# Patient Record
Sex: FEMALE | Race: WHITE | Employment: OTHER | URBAN - METROPOLITAN AREA
[De-identification: names, ages, dates, MRNs, and addresses within clinical notes are randomized per-mention and may not be internally consistent; named-entity substitution may affect disease eponyms.]

---

## 2017-05-10 PROBLEM — K80.12 CALCULUS OF GALLBLADDER WITH ACUTE ON CHRONIC CHOLECYSTITIS WITHOUT OBSTRUCTION: Status: ACTIVE | Noted: 2017-05-10

## 2017-05-24 ENCOUNTER — OFFICE VISIT (OUTPATIENT)
Dept: SURGERY | Age: 67
End: 2017-05-24

## 2017-05-24 VITALS
WEIGHT: 159.6 LBS | HEIGHT: 67 IN | BODY MASS INDEX: 25.05 KG/M2 | RESPIRATION RATE: 14 BRPM | HEART RATE: 90 BPM | SYSTOLIC BLOOD PRESSURE: 124 MMHG | DIASTOLIC BLOOD PRESSURE: 78 MMHG

## 2017-05-24 DIAGNOSIS — K80.12 CALCULUS OF GALLBLADDER WITH ACUTE ON CHRONIC CHOLECYSTITIS WITHOUT OBSTRUCTION: Primary | ICD-10-CM

## 2017-05-24 DIAGNOSIS — Z98.890 POST-OPERATIVE STATE: ICD-10-CM

## 2017-05-24 PROCEDURE — 99024 POSTOP FOLLOW-UP VISIT: CPT | Performed by: SURGERY

## 2017-06-16 ENCOUNTER — HOSPITAL ENCOUNTER (OUTPATIENT)
Dept: OTHER | Age: 67
Discharge: HOME | End: 2017-06-16
Attending: OTOLARYNGOLOGY | Admitting: OTOLARYNGOLOGY

## 2017-06-16 LAB — CALCIUM SERPL-MCNC: 9 MG/DL (ref 8.3–10.6)

## 2019-03-15 ENCOUNTER — APPOINTMENT (OUTPATIENT)
Dept: GENERAL RADIOLOGY | Age: 69
DRG: 247 | End: 2019-03-15
Payer: MEDICARE

## 2019-03-15 ENCOUNTER — HOSPITAL ENCOUNTER (INPATIENT)
Age: 69
LOS: 1 days | Discharge: HOME | DRG: 247 | End: 2019-03-16
Attending: EMERGENCY MEDICINE | Admitting: HOSPITALIST
Payer: MEDICARE

## 2019-03-15 ENCOUNTER — APPOINTMENT (OUTPATIENT)
Dept: CT IMAGING | Age: 69
DRG: 247 | End: 2019-03-15
Payer: MEDICARE

## 2019-03-15 DIAGNOSIS — R07.9 CHEST PAIN, UNSPECIFIED TYPE: Primary | ICD-10-CM

## 2019-03-15 PROBLEM — I20.0 UNSTABLE ANGINA (HCC): Status: ACTIVE | Noted: 2019-03-15

## 2019-03-15 PROBLEM — Q75.2 HYPERTELORISM: Status: ACTIVE | Noted: 2019-03-15

## 2019-03-15 LAB
ALBUMIN SERPL-MCNC: 4.3 GM/DL (ref 3.4–5)
ALP BLD-CCNC: 67 IU/L (ref 40–129)
ALT SERPL-CCNC: 13 U/L (ref 10–40)
ANION GAP SERPL CALCULATED.3IONS-SCNC: 12 MMOL/L (ref 4–16)
AST SERPL-CCNC: 22 IU/L (ref 15–37)
BASOPHILS ABSOLUTE: 0.1 K/CU MM
BASOPHILS RELATIVE PERCENT: 1.1 % (ref 0–1)
BILIRUB SERPL-MCNC: 0.8 MG/DL (ref 0–1)
BUN BLDV-MCNC: 24 MG/DL (ref 6–23)
CALCIUM SERPL-MCNC: 8.7 MG/DL (ref 8.3–10.6)
CHLORIDE BLD-SCNC: 105 MMOL/L (ref 99–110)
CHOLESTEROL: 151 MG/DL
CO2: 24 MMOL/L (ref 21–32)
CREAT SERPL-MCNC: 1 MG/DL (ref 0.6–1.1)
DIFFERENTIAL TYPE: ABNORMAL
EOSINOPHILS ABSOLUTE: 0.1 K/CU MM
EOSINOPHILS RELATIVE PERCENT: 1.7 % (ref 0–3)
GFR AFRICAN AMERICAN: >60 ML/MIN/1.73M2
GFR NON-AFRICAN AMERICAN: 55 ML/MIN/1.73M2
GLUCOSE BLD-MCNC: 108 MG/DL (ref 70–99)
HCT VFR BLD CALC: 42.7 % (ref 37–47)
HDLC SERPL-MCNC: 40 MG/DL
HEMOGLOBIN: 14.1 GM/DL (ref 12.5–16)
IMMATURE NEUTROPHIL %: 0.2 % (ref 0–0.43)
INR BLD: 1.03 INDEX
LDL CHOLESTEROL DIRECT: 112 MG/DL
LV EF: 53 %
LVEF MODALITY: NORMAL
LYMPHOCYTES ABSOLUTE: 1.3 K/CU MM
LYMPHOCYTES RELATIVE PERCENT: 27.1 % (ref 24–44)
MAGNESIUM: 1.8 MG/DL (ref 1.8–2.4)
MCH RBC QN AUTO: 30.5 PG (ref 27–31)
MCHC RBC AUTO-ENTMCNC: 33 % (ref 32–36)
MCV RBC AUTO: 92.4 FL (ref 78–100)
MONOCYTES ABSOLUTE: 0.4 K/CU MM
MONOCYTES RELATIVE PERCENT: 8.1 % (ref 0–4)
NUCLEATED RBC %: 0 %
PDW BLD-RTO: 12.6 % (ref 11.7–14.9)
PLATELET # BLD: 256 K/CU MM (ref 140–440)
PMV BLD AUTO: 9.9 FL (ref 7.5–11.1)
POTASSIUM SERPL-SCNC: 3.8 MMOL/L (ref 3.5–5.1)
PROTHROMBIN TIME: 11.7 SECONDS (ref 9.12–12.5)
RBC # BLD: 4.62 M/CU MM (ref 4.2–5.4)
REASON FOR REJECTION: NORMAL
REASON FOR REJECTION: NORMAL
REJECTED TEST: NORMAL
SEGMENTED NEUTROPHILS ABSOLUTE COUNT: 2.9 K/CU MM
SEGMENTED NEUTROPHILS RELATIVE PERCENT: 61.8 % (ref 36–66)
SODIUM BLD-SCNC: 141 MMOL/L (ref 135–145)
TOTAL IMMATURE NEUTOROPHIL: 0.01 K/CU MM
TOTAL NUCLEATED RBC: 0 K/CU MM
TOTAL PROTEIN: 6.8 GM/DL (ref 6.4–8.2)
TRIGL SERPL-MCNC: 127 MG/DL
TROPONIN T: <0.01 NG/ML
TSH HIGH SENSITIVITY: 4.88 UIU/ML (ref 0.27–4.2)
WBC # BLD: 4.7 K/CU MM (ref 4–10.5)

## 2019-03-15 PROCEDURE — 6370000000 HC RX 637 (ALT 250 FOR IP)

## 2019-03-15 PROCEDURE — C1894 INTRO/SHEATH, NON-LASER: HCPCS

## 2019-03-15 PROCEDURE — 84484 ASSAY OF TROPONIN QUANT: CPT

## 2019-03-15 PROCEDURE — 80061 LIPID PANEL: CPT

## 2019-03-15 PROCEDURE — 36415 COLL VENOUS BLD VENIPUNCTURE: CPT

## 2019-03-15 PROCEDURE — C1874 STENT, COATED/COV W/DEL SYS: HCPCS

## 2019-03-15 PROCEDURE — 92928 PRQ TCAT PLMT NTRAC ST 1 LES: CPT

## 2019-03-15 PROCEDURE — 2580000003 HC RX 258

## 2019-03-15 PROCEDURE — 2140000000 HC CCU INTERMEDIATE R&B

## 2019-03-15 PROCEDURE — 6370000000 HC RX 637 (ALT 250 FOR IP): Performed by: NURSE PRACTITIONER

## 2019-03-15 PROCEDURE — 84443 ASSAY THYROID STIM HORMONE: CPT

## 2019-03-15 PROCEDURE — 99285 EMERGENCY DEPT VISIT HI MDM: CPT

## 2019-03-15 PROCEDURE — 85025 COMPLETE CBC W/AUTO DIFF WBC: CPT

## 2019-03-15 PROCEDURE — 71260 CT THORAX DX C+: CPT

## 2019-03-15 PROCEDURE — 83735 ASSAY OF MAGNESIUM: CPT

## 2019-03-15 PROCEDURE — 96374 THER/PROPH/DIAG INJ IV PUSH: CPT

## 2019-03-15 PROCEDURE — 2500000003 HC RX 250 WO HCPCS

## 2019-03-15 PROCEDURE — 83721 ASSAY OF BLOOD LIPOPROTEIN: CPT

## 2019-03-15 PROCEDURE — 2709999900 HC NON-CHARGEABLE SUPPLY

## 2019-03-15 PROCEDURE — 6360000004 HC RX CONTRAST MEDICATION

## 2019-03-15 PROCEDURE — 94761 N-INVAS EAR/PLS OXIMETRY MLT: CPT

## 2019-03-15 PROCEDURE — C1887 CATHETER, GUIDING: HCPCS

## 2019-03-15 PROCEDURE — 4A023N7 MEASUREMENT OF CARDIAC SAMPLING AND PRESSURE, LEFT HEART, PERCUTANEOUS APPROACH: ICD-10-PCS | Performed by: HOSPITALIST

## 2019-03-15 PROCEDURE — 6360000002 HC RX W HCPCS: Performed by: NURSE PRACTITIONER

## 2019-03-15 PROCEDURE — 80053 COMPREHEN METABOLIC PANEL: CPT

## 2019-03-15 PROCEDURE — 6360000002 HC RX W HCPCS

## 2019-03-15 PROCEDURE — 027034Z DILATION OF CORONARY ARTERY, ONE ARTERY WITH DRUG-ELUTING INTRALUMINAL DEVICE, PERCUTANEOUS APPROACH: ICD-10-PCS | Performed by: HOSPITALIST

## 2019-03-15 PROCEDURE — 6370000000 HC RX 637 (ALT 250 FOR IP): Performed by: INTERNAL MEDICINE

## 2019-03-15 PROCEDURE — 85610 PROTHROMBIN TIME: CPT

## 2019-03-15 PROCEDURE — 93306 TTE W/DOPPLER COMPLETE: CPT

## 2019-03-15 PROCEDURE — 93005 ELECTROCARDIOGRAM TRACING: CPT | Performed by: EMERGENCY MEDICINE

## 2019-03-15 PROCEDURE — 6360000002 HC RX W HCPCS: Performed by: EMERGENCY MEDICINE

## 2019-03-15 PROCEDURE — C1769 GUIDE WIRE: HCPCS

## 2019-03-15 PROCEDURE — 2580000003 HC RX 258: Performed by: INTERNAL MEDICINE

## 2019-03-15 PROCEDURE — B2151ZZ FLUOROSCOPY OF LEFT HEART USING LOW OSMOLAR CONTRAST: ICD-10-PCS | Performed by: HOSPITALIST

## 2019-03-15 PROCEDURE — 6360000004 HC RX CONTRAST MEDICATION: Performed by: INTERNAL MEDICINE

## 2019-03-15 PROCEDURE — 93458 L HRT ARTERY/VENTRICLE ANGIO: CPT

## 2019-03-15 PROCEDURE — 71045 X-RAY EXAM CHEST 1 VIEW: CPT

## 2019-03-15 RX ORDER — ACETAMINOPHEN 325 MG/1
650 TABLET ORAL EVERY 4 HOURS PRN
Status: DISCONTINUED | OUTPATIENT
Start: 2019-03-15 | End: 2019-03-16 | Stop reason: HOSPADM

## 2019-03-15 RX ORDER — NITROGLYCERIN 0.4 MG/1
0.4 TABLET SUBLINGUAL EVERY 5 MIN PRN
Status: DISCONTINUED | OUTPATIENT
Start: 2019-03-15 | End: 2019-03-16 | Stop reason: HOSPADM

## 2019-03-15 RX ORDER — SODIUM CHLORIDE 0.9 % (FLUSH) 0.9 %
10 SYRINGE (ML) INJECTION EVERY 12 HOURS SCHEDULED
Status: DISCONTINUED | OUTPATIENT
Start: 2019-03-15 | End: 2019-03-16 | Stop reason: HOSPADM

## 2019-03-15 RX ORDER — ATORVASTATIN CALCIUM 20 MG/1
20 TABLET, FILM COATED ORAL NIGHTLY
Status: DISCONTINUED | OUTPATIENT
Start: 2019-03-15 | End: 2019-03-16 | Stop reason: HOSPADM

## 2019-03-15 RX ORDER — METHYLPREDNISOLONE SODIUM SUCCINATE 125 MG/2ML
125 INJECTION, POWDER, LYOPHILIZED, FOR SOLUTION INTRAMUSCULAR; INTRAVENOUS ONCE
Status: COMPLETED | OUTPATIENT
Start: 2019-03-15 | End: 2019-03-15

## 2019-03-15 RX ORDER — SODIUM CHLORIDE 0.9 % (FLUSH) 0.9 %
10 SYRINGE (ML) INJECTION PRN
Status: DISCONTINUED | OUTPATIENT
Start: 2019-03-15 | End: 2019-03-16 | Stop reason: HOSPADM

## 2019-03-15 RX ORDER — ONDANSETRON 2 MG/ML
4 INJECTION INTRAMUSCULAR; INTRAVENOUS EVERY 6 HOURS PRN
Status: DISCONTINUED | OUTPATIENT
Start: 2019-03-15 | End: 2019-03-16 | Stop reason: HOSPADM

## 2019-03-15 RX ORDER — CLOPIDOGREL BISULFATE 75 MG/1
75 TABLET ORAL DAILY
Status: DISCONTINUED | OUTPATIENT
Start: 2019-03-16 | End: 2019-03-16 | Stop reason: HOSPADM

## 2019-03-15 RX ORDER — ASPIRIN 81 MG/1
TABLET, CHEWABLE ORAL
Status: COMPLETED
Start: 2019-03-15 | End: 2019-03-15

## 2019-03-15 RX ORDER — SODIUM CHLORIDE 9 MG/ML
INJECTION, SOLUTION INTRAVENOUS
Status: COMPLETED
Start: 2019-03-15 | End: 2019-03-15

## 2019-03-15 RX ORDER — LOSARTAN POTASSIUM AND HYDROCHLOROTHIAZIDE 12.5; 5 MG/1; MG/1
2 TABLET ORAL NIGHTLY
Status: DISCONTINUED | OUTPATIENT
Start: 2019-03-15 | End: 2019-03-16 | Stop reason: HOSPADM

## 2019-03-15 RX ORDER — ASPIRIN 81 MG/1
81 TABLET, CHEWABLE ORAL DAILY
Status: DISCONTINUED | OUTPATIENT
Start: 2019-03-16 | End: 2019-03-15 | Stop reason: SDUPTHER

## 2019-03-15 RX ORDER — 0.9 % SODIUM CHLORIDE 0.9 %
10 VIAL (ML) INJECTION
Status: COMPLETED | OUTPATIENT
Start: 2019-03-15 | End: 2019-03-15

## 2019-03-15 RX ORDER — ATROPINE SULFATE 0.4 MG/ML
0.5 AMPUL (ML) INJECTION
Status: ACTIVE | OUTPATIENT
Start: 2019-03-15 | End: 2019-03-15

## 2019-03-15 RX ORDER — SODIUM CHLORIDE 9 MG/ML
INJECTION, SOLUTION INTRAVENOUS CONTINUOUS
Status: DISCONTINUED | OUTPATIENT
Start: 2019-03-15 | End: 2019-03-16

## 2019-03-15 RX ORDER — MELOXICAM 15 MG/1
15 TABLET ORAL DAILY PRN
COMMUNITY
End: 2022-09-20

## 2019-03-15 RX ORDER — LOSARTAN POTASSIUM AND HYDROCHLOROTHIAZIDE 25; 100 MG/1; MG/1
1 TABLET ORAL NIGHTLY
Status: ON HOLD | COMMUNITY
End: 2019-03-16 | Stop reason: HOSPADM

## 2019-03-15 RX ORDER — METOPROLOL SUCCINATE 25 MG/1
25 TABLET, EXTENDED RELEASE ORAL NIGHTLY
Status: DISCONTINUED | OUTPATIENT
Start: 2019-03-15 | End: 2019-03-16 | Stop reason: HOSPADM

## 2019-03-15 RX ORDER — ASPIRIN 81 MG/1
81 TABLET, CHEWABLE ORAL DAILY
Status: DISCONTINUED | OUTPATIENT
Start: 2019-03-16 | End: 2019-03-16 | Stop reason: HOSPADM

## 2019-03-15 RX ORDER — MORPHINE SULFATE 4 MG/ML
1 INJECTION, SOLUTION INTRAMUSCULAR; INTRAVENOUS
Status: ACTIVE | OUTPATIENT
Start: 2019-03-15 | End: 2019-03-15

## 2019-03-15 RX ORDER — SODIUM CHLORIDE 9 MG/ML
INJECTION, SOLUTION INTRAVENOUS CONTINUOUS
Status: DISCONTINUED | OUTPATIENT
Start: 2019-03-15 | End: 2019-03-16 | Stop reason: HOSPADM

## 2019-03-15 RX ORDER — METOPROLOL SUCCINATE 25 MG/1
25 TABLET, EXTENDED RELEASE ORAL NIGHTLY
Status: ON HOLD | COMMUNITY
End: 2019-03-16 | Stop reason: HOSPADM

## 2019-03-15 RX ADMIN — Medication 10 ML: at 13:04

## 2019-03-15 RX ADMIN — ENOXAPARIN SODIUM 40 MG: 40 INJECTION SUBCUTANEOUS at 15:02

## 2019-03-15 RX ADMIN — LOSARTAN POTASSIUM AND HYDROCHLOROTHIAZIDE 2 TABLET: 12.5; 5 TABLET ORAL at 21:01

## 2019-03-15 RX ADMIN — SODIUM CHLORIDE: 9 INJECTION, SOLUTION INTRAVENOUS at 10:51

## 2019-03-15 RX ADMIN — SODIUM CHLORIDE: 9 INJECTION, SOLUTION INTRAVENOUS at 21:02

## 2019-03-15 RX ADMIN — ATORVASTATIN CALCIUM 20 MG: 20 TABLET, FILM COATED ORAL at 21:02

## 2019-03-15 RX ADMIN — METHYLPREDNISOLONE SODIUM SUCCINATE 125 MG: 125 INJECTION, POWDER, FOR SOLUTION INTRAMUSCULAR; INTRAVENOUS at 10:51

## 2019-03-15 RX ADMIN — METOPROLOL SUCCINATE 25 MG: 25 TABLET, EXTENDED RELEASE ORAL at 21:01

## 2019-03-15 RX ADMIN — ASPIRIN 81 MG 324 MG: 81 TABLET ORAL at 10:51

## 2019-03-15 RX ADMIN — SODIUM CHLORIDE: 9 INJECTION, SOLUTION INTRAVENOUS at 13:15

## 2019-03-15 RX ADMIN — IOPAMIDOL 75 ML: 755 INJECTION, SOLUTION INTRAVENOUS at 13:04

## 2019-03-15 ASSESSMENT — PAIN DESCRIPTION - ORIENTATION: ORIENTATION: LEFT

## 2019-03-15 ASSESSMENT — PAIN DESCRIPTION - LOCATION: LOCATION: CHEST

## 2019-03-15 ASSESSMENT — PAIN SCALES - GENERAL: PAINLEVEL_OUTOF10: 2

## 2019-03-16 VITALS
DIASTOLIC BLOOD PRESSURE: 62 MMHG | OXYGEN SATURATION: 100 % | HEIGHT: 67 IN | BODY MASS INDEX: 25.9 KG/M2 | WEIGHT: 165 LBS | HEART RATE: 79 BPM | SYSTOLIC BLOOD PRESSURE: 122 MMHG | RESPIRATION RATE: 18 BRPM | TEMPERATURE: 97.4 F

## 2019-03-16 LAB
ANION GAP SERPL CALCULATED.3IONS-SCNC: 11 MMOL/L (ref 4–16)
BUN BLDV-MCNC: 24 MG/DL (ref 6–23)
CALCIUM SERPL-MCNC: 9 MG/DL (ref 8.3–10.6)
CHLORIDE BLD-SCNC: 103 MMOL/L (ref 99–110)
CHOLESTEROL: 159 MG/DL
CO2: 26 MMOL/L (ref 21–32)
CREAT SERPL-MCNC: 0.9 MG/DL (ref 0.6–1.1)
EKG ATRIAL RATE: 74 BPM
EKG DIAGNOSIS: NORMAL
EKG P AXIS: 53 DEGREES
EKG P-R INTERVAL: 176 MS
EKG Q-T INTERVAL: 386 MS
EKG QRS DURATION: 86 MS
EKG QTC CALCULATION (BAZETT): 428 MS
EKG R AXIS: -44 DEGREES
EKG T AXIS: 21 DEGREES
EKG VENTRICULAR RATE: 74 BPM
GFR AFRICAN AMERICAN: >60 ML/MIN/1.73M2
GFR NON-AFRICAN AMERICAN: >60 ML/MIN/1.73M2
GLUCOSE BLD-MCNC: 164 MG/DL (ref 70–99)
HCT VFR BLD CALC: 37.3 % (ref 37–47)
HDLC SERPL-MCNC: 47 MG/DL
HEMOGLOBIN: 12.3 GM/DL (ref 12.5–16)
LDL CHOLESTEROL DIRECT: 110 MG/DL
MAGNESIUM: 2 MG/DL (ref 1.8–2.4)
MCH RBC QN AUTO: 30.1 PG (ref 27–31)
MCHC RBC AUTO-ENTMCNC: 33 % (ref 32–36)
MCV RBC AUTO: 91.4 FL (ref 78–100)
PDW BLD-RTO: 12.3 % (ref 11.7–14.9)
PLATELET # BLD: 281 K/CU MM (ref 140–440)
PMV BLD AUTO: 10.5 FL (ref 7.5–11.1)
POTASSIUM SERPL-SCNC: 3.4 MMOL/L (ref 3.5–5.1)
POTASSIUM SERPL-SCNC: 3.7 MMOL/L (ref 3.5–5.1)
RBC # BLD: 4.08 M/CU MM (ref 4.2–5.4)
SODIUM BLD-SCNC: 140 MMOL/L (ref 135–145)
TRIGL SERPL-MCNC: 113 MG/DL
TROPONIN T: <0.01 NG/ML
WBC # BLD: 9.7 K/CU MM (ref 4–10.5)

## 2019-03-16 PROCEDURE — 85027 COMPLETE CBC AUTOMATED: CPT

## 2019-03-16 PROCEDURE — 80061 LIPID PANEL: CPT

## 2019-03-16 PROCEDURE — 84132 ASSAY OF SERUM POTASSIUM: CPT

## 2019-03-16 PROCEDURE — 80048 BASIC METABOLIC PNL TOTAL CA: CPT

## 2019-03-16 PROCEDURE — 6360000002 HC RX W HCPCS: Performed by: NURSE PRACTITIONER

## 2019-03-16 PROCEDURE — 36415 COLL VENOUS BLD VENIPUNCTURE: CPT

## 2019-03-16 PROCEDURE — 94761 N-INVAS EAR/PLS OXIMETRY MLT: CPT

## 2019-03-16 PROCEDURE — 6370000000 HC RX 637 (ALT 250 FOR IP): Performed by: INTERNAL MEDICINE

## 2019-03-16 PROCEDURE — 83735 ASSAY OF MAGNESIUM: CPT

## 2019-03-16 PROCEDURE — 84484 ASSAY OF TROPONIN QUANT: CPT

## 2019-03-16 PROCEDURE — 6360000002 HC RX W HCPCS: Performed by: HOSPITALIST

## 2019-03-16 PROCEDURE — 83721 ASSAY OF BLOOD LIPOPROTEIN: CPT

## 2019-03-16 RX ORDER — ASPIRIN 81 MG/1
81 TABLET, CHEWABLE ORAL DAILY
Qty: 30 TABLET | Refills: 3 | Status: SHIPPED | OUTPATIENT
Start: 2019-03-17

## 2019-03-16 RX ORDER — NITROGLYCERIN 0.4 MG/1
TABLET SUBLINGUAL
Qty: 25 TABLET | Refills: 3 | Status: SHIPPED | OUTPATIENT
Start: 2019-03-16

## 2019-03-16 RX ORDER — CLOPIDOGREL BISULFATE 75 MG/1
75 TABLET ORAL DAILY
Qty: 30 TABLET | Refills: 3 | Status: ON HOLD | OUTPATIENT
Start: 2019-03-17 | End: 2022-01-10 | Stop reason: HOSPADM

## 2019-03-16 RX ORDER — ATORVASTATIN CALCIUM 20 MG/1
20 TABLET, FILM COATED ORAL NIGHTLY
Qty: 30 TABLET | Refills: 3 | Status: ON HOLD | OUTPATIENT
Start: 2019-03-16 | End: 2022-01-10 | Stop reason: HOSPADM

## 2019-03-16 RX ORDER — POTASSIUM CHLORIDE 7.45 MG/ML
10 INJECTION INTRAVENOUS PRN
Status: DISCONTINUED | OUTPATIENT
Start: 2019-03-16 | End: 2019-03-16 | Stop reason: HOSPADM

## 2019-03-16 RX ORDER — METOPROLOL SUCCINATE 25 MG/1
25 TABLET, EXTENDED RELEASE ORAL NIGHTLY
Qty: 30 TABLET | Refills: 3 | Status: SHIPPED | OUTPATIENT
Start: 2019-03-16 | End: 2022-09-20

## 2019-03-16 RX ORDER — LOSARTAN POTASSIUM AND HYDROCHLOROTHIAZIDE 12.5; 5 MG/1; MG/1
2 TABLET ORAL NIGHTLY
Qty: 30 TABLET | Refills: 3 | Status: ON HOLD | OUTPATIENT
Start: 2019-03-16 | End: 2022-01-10 | Stop reason: SDUPTHER

## 2019-03-16 RX ADMIN — POTASSIUM CHLORIDE 10 MEQ: 7.46 INJECTION, SOLUTION INTRAVENOUS at 12:37

## 2019-03-16 RX ADMIN — POTASSIUM CHLORIDE 10 MEQ: 7.46 INJECTION, SOLUTION INTRAVENOUS at 09:46

## 2019-03-16 RX ADMIN — POTASSIUM CHLORIDE 10 MEQ: 7.46 INJECTION, SOLUTION INTRAVENOUS at 14:15

## 2019-03-16 RX ADMIN — ENOXAPARIN SODIUM 40 MG: 40 INJECTION SUBCUTANEOUS at 08:48

## 2019-03-16 RX ADMIN — POTASSIUM CHLORIDE 10 MEQ: 7.46 INJECTION, SOLUTION INTRAVENOUS at 10:58

## 2019-03-16 RX ADMIN — ASPIRIN 81 MG 81 MG: 81 TABLET ORAL at 08:48

## 2019-03-16 RX ADMIN — CLOPIDOGREL BISULFATE 75 MG: 75 TABLET ORAL at 08:47

## 2019-06-16 ENCOUNTER — HOSPITAL ENCOUNTER (OUTPATIENT)
Age: 69
Setting detail: OBSERVATION
Discharge: HOME | End: 2019-06-17
Attending: EMERGENCY MEDICINE | Admitting: HOSPITALIST
Payer: MEDICARE

## 2019-06-16 ENCOUNTER — APPOINTMENT (OUTPATIENT)
Dept: GENERAL RADIOLOGY | Age: 69
End: 2019-06-16
Payer: MEDICARE

## 2019-06-16 DIAGNOSIS — R07.9 CHEST PAIN, UNSPECIFIED TYPE: Primary | ICD-10-CM

## 2019-06-16 LAB
ALBUMIN SERPL-MCNC: 4.2 GM/DL (ref 3.4–5)
ALP BLD-CCNC: 70 IU/L (ref 40–128)
ALT SERPL-CCNC: 11 U/L (ref 10–40)
ANION GAP SERPL CALCULATED.3IONS-SCNC: 10 MMOL/L (ref 4–16)
AST SERPL-CCNC: 16 IU/L (ref 15–37)
BASOPHILS ABSOLUTE: 0 K/CU MM
BASOPHILS RELATIVE PERCENT: 0.9 % (ref 0–1)
BILIRUB SERPL-MCNC: 0.4 MG/DL (ref 0–1)
BUN BLDV-MCNC: 19 MG/DL (ref 6–23)
CALCIUM SERPL-MCNC: 9.8 MG/DL (ref 8.3–10.6)
CHLORIDE BLD-SCNC: 102 MMOL/L (ref 99–110)
CO2: 26 MMOL/L (ref 21–32)
CREAT SERPL-MCNC: 1.1 MG/DL (ref 0.6–1.1)
DIFFERENTIAL TYPE: ABNORMAL
EOSINOPHILS ABSOLUTE: 0.1 K/CU MM
EOSINOPHILS RELATIVE PERCENT: 2.5 % (ref 0–3)
GFR AFRICAN AMERICAN: 59 ML/MIN/1.73M2
GFR NON-AFRICAN AMERICAN: 49 ML/MIN/1.73M2
GLUCOSE BLD-MCNC: 121 MG/DL (ref 70–99)
HCT VFR BLD CALC: 38.8 % (ref 37–47)
HEMOGLOBIN: 12.4 GM/DL (ref 12.5–16)
IMMATURE NEUTROPHIL %: 0.2 % (ref 0–0.43)
LYMPHOCYTES ABSOLUTE: 1.2 K/CU MM
LYMPHOCYTES RELATIVE PERCENT: 27.4 % (ref 24–44)
MAGNESIUM: 1.7 MG/DL (ref 1.8–2.4)
MCH RBC QN AUTO: 30 PG (ref 27–31)
MCHC RBC AUTO-ENTMCNC: 32 % (ref 32–36)
MCV RBC AUTO: 93.9 FL (ref 78–100)
MONOCYTES ABSOLUTE: 0.3 K/CU MM
MONOCYTES RELATIVE PERCENT: 7.6 % (ref 0–4)
NUCLEATED RBC %: 0 %
PDW BLD-RTO: 12.4 % (ref 11.7–14.9)
PLATELET # BLD: 220 K/CU MM (ref 140–440)
PMV BLD AUTO: 10 FL (ref 7.5–11.1)
POTASSIUM SERPL-SCNC: 4.2 MMOL/L (ref 3.5–5.1)
RBC # BLD: 4.13 M/CU MM (ref 4.2–5.4)
SEGMENTED NEUTROPHILS ABSOLUTE COUNT: 2.7 K/CU MM
SEGMENTED NEUTROPHILS RELATIVE PERCENT: 61.4 % (ref 36–66)
SODIUM BLD-SCNC: 138 MMOL/L (ref 135–145)
TOTAL IMMATURE NEUTOROPHIL: 0.01 K/CU MM
TOTAL NUCLEATED RBC: 0 K/CU MM
TOTAL PROTEIN: 6.8 GM/DL (ref 6.4–8.2)
TROPONIN T: <0.01 NG/ML
WBC # BLD: 4.4 K/CU MM (ref 4–10.5)

## 2019-06-16 PROCEDURE — 83735 ASSAY OF MAGNESIUM: CPT

## 2019-06-16 PROCEDURE — 71046 X-RAY EXAM CHEST 2 VIEWS: CPT

## 2019-06-16 PROCEDURE — G0378 HOSPITAL OBSERVATION PER HR: HCPCS

## 2019-06-16 PROCEDURE — 93005 ELECTROCARDIOGRAM TRACING: CPT | Performed by: EMERGENCY MEDICINE

## 2019-06-16 PROCEDURE — 6360000002 HC RX W HCPCS: Performed by: NURSE PRACTITIONER

## 2019-06-16 PROCEDURE — 6370000000 HC RX 637 (ALT 250 FOR IP): Performed by: NURSE PRACTITIONER

## 2019-06-16 PROCEDURE — 85025 COMPLETE CBC W/AUTO DIFF WBC: CPT

## 2019-06-16 PROCEDURE — 99285 EMERGENCY DEPT VISIT HI MDM: CPT

## 2019-06-16 PROCEDURE — 36415 COLL VENOUS BLD VENIPUNCTURE: CPT

## 2019-06-16 PROCEDURE — 96372 THER/PROPH/DIAG INJ SC/IM: CPT

## 2019-06-16 PROCEDURE — 80053 COMPREHEN METABOLIC PANEL: CPT

## 2019-06-16 PROCEDURE — 84484 ASSAY OF TROPONIN QUANT: CPT

## 2019-06-16 PROCEDURE — 6370000000 HC RX 637 (ALT 250 FOR IP): Performed by: EMERGENCY MEDICINE

## 2019-06-16 PROCEDURE — 2580000003 HC RX 258: Performed by: NURSE PRACTITIONER

## 2019-06-16 RX ORDER — SODIUM CHLORIDE 0.9 % (FLUSH) 0.9 %
10 SYRINGE (ML) INJECTION EVERY 12 HOURS SCHEDULED
Status: DISCONTINUED | OUTPATIENT
Start: 2019-06-16 | End: 2019-06-17 | Stop reason: HOSPADM

## 2019-06-16 RX ORDER — ONDANSETRON 2 MG/ML
4 INJECTION INTRAMUSCULAR; INTRAVENOUS EVERY 6 HOURS PRN
Status: DISCONTINUED | OUTPATIENT
Start: 2019-06-16 | End: 2019-06-17 | Stop reason: HOSPADM

## 2019-06-16 RX ORDER — ASPIRIN 81 MG/1
81 TABLET, CHEWABLE ORAL DAILY
Status: DISCONTINUED | OUTPATIENT
Start: 2019-06-16 | End: 2019-06-17 | Stop reason: HOSPADM

## 2019-06-16 RX ORDER — ATORVASTATIN CALCIUM 40 MG/1
40 TABLET, FILM COATED ORAL NIGHTLY
Status: DISCONTINUED | OUTPATIENT
Start: 2019-06-16 | End: 2019-06-17 | Stop reason: HOSPADM

## 2019-06-16 RX ORDER — SODIUM CHLORIDE 0.9 % (FLUSH) 0.9 %
10 SYRINGE (ML) INJECTION PRN
Status: DISCONTINUED | OUTPATIENT
Start: 2019-06-16 | End: 2019-06-17 | Stop reason: HOSPADM

## 2019-06-16 RX ORDER — ASPIRIN 81 MG/1
324 TABLET, CHEWABLE ORAL ONCE
Status: COMPLETED | OUTPATIENT
Start: 2019-06-16 | End: 2019-06-16

## 2019-06-16 RX ORDER — LOSARTAN POTASSIUM AND HYDROCHLOROTHIAZIDE 12.5; 5 MG/1; MG/1
2 TABLET ORAL NIGHTLY
Status: DISCONTINUED | OUTPATIENT
Start: 2019-06-16 | End: 2019-06-17

## 2019-06-16 RX ORDER — NITROGLYCERIN 0.4 MG/1
0.4 TABLET SUBLINGUAL EVERY 5 MIN PRN
Status: DISCONTINUED | OUTPATIENT
Start: 2019-06-16 | End: 2019-06-17 | Stop reason: HOSPADM

## 2019-06-16 RX ORDER — METOPROLOL SUCCINATE 25 MG/1
25 TABLET, EXTENDED RELEASE ORAL NIGHTLY
Status: DISCONTINUED | OUTPATIENT
Start: 2019-06-16 | End: 2019-06-17

## 2019-06-16 RX ORDER — CLOPIDOGREL BISULFATE 75 MG/1
75 TABLET ORAL DAILY
Status: DISCONTINUED | OUTPATIENT
Start: 2019-06-16 | End: 2019-06-17 | Stop reason: HOSPADM

## 2019-06-16 RX ORDER — ACETAMINOPHEN 325 MG/1
650 TABLET ORAL EVERY 4 HOURS PRN
Status: DISCONTINUED | OUTPATIENT
Start: 2019-06-16 | End: 2019-06-17 | Stop reason: HOSPADM

## 2019-06-16 RX ORDER — ATORVASTATIN CALCIUM 20 MG/1
20 TABLET, FILM COATED ORAL NIGHTLY
Status: DISCONTINUED | OUTPATIENT
Start: 2019-06-16 | End: 2019-06-16 | Stop reason: SDUPTHER

## 2019-06-16 RX ADMIN — ASPIRIN 81 MG 324 MG: 81 TABLET ORAL at 13:41

## 2019-06-16 RX ADMIN — METOPROLOL SUCCINATE 25 MG: 25 TABLET, EXTENDED RELEASE ORAL at 20:58

## 2019-06-16 RX ADMIN — ACETAMINOPHEN 650 MG: 325 TABLET ORAL at 21:13

## 2019-06-16 RX ADMIN — ATORVASTATIN CALCIUM 40 MG: 40 TABLET, FILM COATED ORAL at 20:58

## 2019-06-16 RX ADMIN — SODIUM CHLORIDE, PRESERVATIVE FREE 10 ML: 5 INJECTION INTRAVENOUS at 20:58

## 2019-06-16 RX ADMIN — ENOXAPARIN SODIUM 40 MG: 40 INJECTION SUBCUTANEOUS at 20:58

## 2019-06-16 RX ADMIN — NITROGLYCERIN 0.4 MG: 0.4 TABLET, ORALLY DISINTEGRATING SUBLINGUAL at 13:41

## 2019-06-16 ASSESSMENT — PAIN SCALES - GENERAL
PAINLEVEL_OUTOF10: 0
PAINLEVEL_OUTOF10: 7
PAINLEVEL_OUTOF10: 4

## 2019-06-16 ASSESSMENT — PAIN DESCRIPTION - PAIN TYPE: TYPE: ACUTE PAIN

## 2019-06-16 ASSESSMENT — PAIN DESCRIPTION - LOCATION: LOCATION: CHEST

## 2019-06-16 ASSESSMENT — PAIN DESCRIPTION - DESCRIPTORS: DESCRIPTORS: SHARP

## 2019-06-16 NOTE — ED NOTES
Bed: ED-15  Expected date:   Expected time:   Means of arrival:   Comments:  Triage KATIANA Gamino RN  06/16/19 8929

## 2019-06-16 NOTE — ED PROVIDER NOTES
Emergency Department Encounter    Patient: Maycol Little  MRN: [id number]  : 1950  Date of Evaluation: 2019  ED Provider:  Tom Coyne    Triage Chief Complaint:   Chest Pain and Dizziness    [place]:  Maycol Little is a 78 y.o. female that presents with complaint of chest pain and lightheadedness, chest pain radiated to the left arm. Started this morning, she took 2 nitro at home with some resolution of pain, went from a 7 to a 5 out of 10. She has stent placed 3 months ago by Dr. Karlene Narayan. She is not having any numbness or weakness in her extremities. She has been having episodes of shortness of breath with exertion since the stent was placed. She is on Plavix with no missed doses. No other blood thinners. No leg swelling or pain. No fevers. No cough. Denies other exacerbating factors for the pain. ROS:  10 systems reviewed and negative except as above.      Past Medical History:   Diagnosis Date    Hyperlipidemia     Hypertension     \"on medication since \"     Past Surgical History:   Procedure Laterality Date    ABDOMINOPLASTY  10+ yrs ago    \"tummy tuck\"    BREAST REDUCTION SURGERY      belle    CHOLECYSTECTOMY, LAPAROSCOPIC  05/10/2017    CORONARY ANGIOPLASTY WITH STENT PLACEMENT      HYSTERECTOMY  age 44    left both ovaries\"    KNEE ARTHROPLASTY Right 2015     Family History   Problem Relation Age of Onset    Stroke Mother     No Known Problems Father     Heart Disease Brother      Social History     Socioeconomic History    Marital status:      Spouse name: Not on file    Number of children: Not on file    Years of education: Not on file    Highest education level: Not on file   Occupational History    Not on file   Social Needs    Financial resource strain: Not on file    Food insecurity:     Worry: Not on file     Inability: Not on file    Transportation needs:     Medical: Not on file     Non-medical: Not on file   Tobacco Use    Smoking status: Never Smoker    Smokeless tobacco: Never Used   Substance and Sexual Activity    Alcohol use: Yes     Comment: Socially    Drug use: No    Sexual activity: Not on file   Lifestyle    Physical activity:     Days per week: Not on file     Minutes per session: Not on file    Stress: Not on file   Relationships    Social connections:     Talks on phone: Not on file     Gets together: Not on file     Attends [employer] service: Not on file     Active member of club or organization: Not on file     Attends meetings of clubs or organizations: Not on file     Relationship status: Not on file    Intimate partner violence:     Fear of current or ex partner: Not on file     Emotionally abused: Not on file     Physically abused: Not on file     Forced sexual activity: Not on file   Other Topics Concern    Not on file   Social History Narrative    Not on file     Current Facility-Administered Medications   Medication Dose Route Frequency Provider Last Rate Last Dose    nitroGLYCERIN (NITROSTAT) SL tablet 0.4 mg  0.4 mg Sublingual Q5 Min PRN Branden Hoyt MD   0.4 mg at 06/16/19 1341     Current Outpatient Medications   Medication Sig Dispense Refill    aspirin 81 MG chewable tablet Take 1 tablet by mouth daily 30 tablet 3    nitroGLYCERIN (NITROSTAT) 0.4 MG SL tablet up to max of 3 total doses.  If no relief after 1 dose, call 911. 25 tablet 3    atorvastatin (LIPITOR) 20 MG tablet Take 1 tablet by mouth nightly 30 tablet 3    losartan-hydrochlorothiazide (HYZAAR) 50-12.5 MG per tablet Take 2 tablets by mouth nightly 30 tablet 3    metoprolol succinate (TOPROL XL) 25 MG extended release tablet Take 1 tablet by mouth nightly 30 tablet 3    clopidogrel (PLAVIX) 75 MG tablet Take 1 tablet by mouth daily 30 tablet 3    meloxicam (MOBIC) 15 MG tablet Take 15 mg by mouth daily as needed for Pain      Fexofenadine HCl (ALLEGRA PO) Take by mouth       Allergies   Allergen Reactions    Dye [Iodides] Hives     \"Broke out in hives and got real red and hot. \"    Ibuprofen Other (See Comments)     \"Slows heart rate way down\"       Nursing Notes Reviewed    Physical Exam:  Triage VS:    ED Triage Vitals [06/16/19 1218]   Enc Vitals Group      BP (!) 132/58      Pulse 61      Resp 16      Temp 97.9 °F (36.6 °C)      Temp Source Oral      SpO2 98 %      Weight 160 lb (72.6 kg)      Height 5' 7\" (1.702 m)      Head Circumference       Peak Flow       Pain Score       Pain Loc       Pain Edu? Excl. in [address]? My pulse ox interpretation is - normal    General appearance:  No acute distress. Skin:  Warm. Dry. Eye:  Extraocular movements intact. Ears, nose, mouth and throat:  Oral mucosa moist   Neck:  Trachea midline. Extremity:  No swelling. Normal ROM    Heart:  Regular rate and rhythm, normal S1 & S2, no extra heart sounds. Perfusion:  intact  Respiratory:  Lungs clear to auscultation bilaterally. Respirations nonlabored. Abdominal:   Soft. Nontender. Non distended.   Neurological:  Alert and oriented          Psychiatric:  Appropriate    I have reviewed and interpreted all of the currently available lab results from this visit (if applicable):  Results for orders placed or performed during the hospital encounter of 06/16/19   CBC auto diff   Result Value Ref Range    WBC 4.4 4.0 - 10.5 K/CU MM    RBC 4.13 (L) 4.2 - 5.4 M/CU MM    Hemoglobin 12.4 (L) 12.5 - 16.0 GM/DL    Hematocrit 38.8 37 - 47 %    MCV 93.9 78 - 100 FL    MCH 30.0 27 - 31 PG    MCHC 32.0 32.0 - 36.0 %    RDW 12.4 11.7 - 14.9 %    Platelets 459 527 - 538 K/CU MM    MPV 10.0 7.5 - 11.1 FL    Differential Type AUTOMATED DIFFERENTIAL     Segs Relative 61.4 36 - 66 %    Lymphocytes % 27.4 24 - 44 %    Monocytes % 7.6 (H) 0 - 4 %    Eosinophils % 2.5 0 - 3 %    Basophils % 0.9 0 - 1 %    Segs Absolute 2.7 K/CU MM    Lymphocytes # 1.2 K/CU MM    Monocytes # 0.3 K/CU MM    Eosinophils # 0.1 K/CU MM    Basophils # 0.0 K/CU MM    Nucleated RBC % 0.0 vitals are stable, EKG unchanged. Basic ACS work-up initiated. She has no risk factors for PE, no leg swelling or pain on my exam, no shortness of breath, vitals are normal.  Troponin is normal, electrolytes are stable. Plan for admission given symptoms concerning for possible in-stent occlusion, ACS. She is currently comfortable. Clinical Impression:  1. Chest pain, unspecified type      Disposition referral (if applicable):  No follow-up provider specified. Disposition medications (if applicable):  New Prescriptions    No medications on file       Comment: Please note this report has been produced using speech recognition software and may contain errors related to that system including errors in grammar, punctuation, and spelling, as well as words and phrases that may be inappropriate. Efforts were made to edit the dictations. Jeovany Shah MD  06/16/19 1408        Patient accepted to hospitalist team by Lucila Yang NP. I have also spoken with Dr. Kevin NOLASCO, Papito Davidson and he will come see the patient.       Jeovany Shah MD  06/16/19 9668

## 2019-06-16 NOTE — H&P
History and Physical      Name:  Sarina Perdomo /Age/Sex: 1950  (78 y.o. female)   MRN & CSN:  [id number] & [id number] Admission Date/Time: 2019  1:11 PM   Location:  [facility] PCP: Suhail Flaherty MD       Admitting Physicians : Dr. Luisito Braxton is a 78 y.o.  female  who presents with Chest Pain and Dizziness    Assessment and Plan:   1. Chest pain, unspecified  Initial troponin negative. EKG with no ischemic changes. Hx of CAD s/p PCI of OM (3/2019). Last echo with EF 50-55% and grade I DD (3/2019)  -Continue ASA, Plavix, Lipitor, Nitro, BB, and ARB's  -Serial troponin  -Consult cardiology      2. HTN, normotensive  -Continue Metoprolol and Hyzaar    3.  HLD  -Continue Lipitor        DVT-PPX: Lovenox  Diet: Cardiac    Medications:   Medications:    aspirin  81 mg Oral Daily    atorvastatin  20 mg Oral Nightly    clopidogrel  75 mg Oral Daily    losartan-hydrochlorothiazide  2 tablet Oral Nightly    metoprolol succinate  25 mg Oral Nightly    sodium chloride flush  10 mL Intravenous 2 times per day    atorvastatin  40 mg Oral Nightly    enoxaparin  40 mg Subcutaneous Daily      Infusions:   PRN Meds:   nitroGLYCERIN 0.4 mg Q5 Min PRN   sodium chloride flush 10 mL PRN   magnesium hydroxide 30 mL Daily PRN   ondansetron 4 mg Q6H PRN       Current Facility-Administered Medications:     nitroGLYCERIN (NITROSTAT) SL tablet 0.4 mg, 0.4 mg, Sublingual, Q5 Min PRN, МАРИНА Bland - CNP, 0.4 mg at 19 1341    aspirin chewable tablet 81 mg, 81 mg, Oral, Daily, Miguelre Summer, APRN - CNP    atorvastatin (LIPITOR) tablet 20 mg, 20 mg, Oral, Nightly, Erika Wheeler, APRN - CNP    clopidogrel (PLAVIX) tablet 75 mg, 75 mg, Oral, Daily, Vibha Shipley APRN - CNP    losartan-hydrochlorothiazide (HYZAAR) 50-12.5 MG per tablet 2 tablet, 2 tablet, Oral, Nightly, Miguelre Summer, APRN - CNP    metoprolol succinate (TOPROL XL) extended release tablet 25 mg, 25 mg, Oral, Nightly, Silvaborn Arabia, APRN - CNP    sodium chloride flush 0.9 % injection 10 mL, 10 mL, Intravenous, 2 times per day, Silvaborn Arabia, APRN - CNP    sodium chloride flush 0.9 % injection 10 mL, 10 mL, Intravenous, PRN, Silvaborn Arabia, APRN - CNP    magnesium hydroxide (MILK OF MAGNESIA) 400 MG/5ML suspension 30 mL, 30 mL, Oral, Daily PRN, Silvaborn Arabia, APRN - CNP    ondansetron (ZOFRAN) injection 4 mg, 4 mg, Intravenous, Q6H PRN, Silvaborn Arabia, APRN - CNP    atorvastatin (LIPITOR) tablet 40 mg, 40 mg, Oral, Nightly, Vibha Lynn, APRN - CNP    enoxaparin (LOVENOX) injection 40 mg, 40 mg, Subcutaneous, Daily, Silvaborn Arabia, APRN - CNP    History of present illness     Chief Complaint: Chest Pain and Dizziness      Soraya Fry is a 78 y.o.  female  who presents with 7/10 constant chest pressure with radiation to left arm that started this morning. Patient took 2 Nitro with relief. Other associated symptoms are lightheadedness and mild shortness of breath. Patient had stent placement 3 months ago and has been doing well. She did follow up with Dr. Lucila Cheung and was asked to resume regular activities. She states since then she has been having episode of shortness of breath with exertion. Denies leg swelling, hx of DVT/PE, recent travel, or illness. Hx of HTN, and HLD. Review of Systems   Ten point ROS reviewed and negative, unless as noted below. GENERAL:  Denies fever, chills, night sweats, or changes in weight. EYES:  Denies recent visual changes. ENT:  Denies ear pain, hearing loss or tinnitus  RESP:  Denies any cough, dyspnea, or wheezing. CV:  chest pain , palpitations, syncope, or edema. GI:  Denies any dysphagia, nausea, vomiting, abdominal pain, heartburn, changes in bowel habit, melena or rectal bleeding  MUSCULOSKELETAL:  Denies any joint swelling, joint pain, or loss of range of motion.   NEURO:  Denies any headaches, tremors, dizziness, vertigo, memory loss, confusion, weakness, numbness or tingling. PSYCH:  Denies any sleeping problems, history of abuse, marital discord. HEME/LYMPHATIC/IMMUNO:  Denies , bruising, bleeding abnormalities   ENDO:  Denies any heat or cold intolerance, panemiaolyuria or polydipsia. Objective:   No intake or output data in the 24 hours ending 06/16/19 1557   Vitals:   Vitals:    06/16/19 1551   BP: (!) 141/65   Pulse: 58   Resp: 15   Temp: 97.6 °F (36.4 °C)   SpO2:      Physical Exam:   Gen:  awake, alert, cooperative, no apparent distress  EYES:Lids and lashes normal, pupils equal, round ,extra ocular muscles intact, sclera clear, conjunctiva normal  ENT:  Normocephalic, oral pharynx with moist mucus membranes  NECK:  Supple, symmetrical, trachea midline, no adenopathy,  LUNGS:  Clear to auscultate bilaterally, no rales ronchi or wheezing noted. CARDIOVASCULAR:  Bradycardic, normal S1 and S2,no murmur noted, peripheral pulses 2+, no pitting edema  ABDOMEN: Normal BS, Non tender, non distended, no HSM noted. MUSCULOSKELETAL:  ROM of all extremities grossly wnl  NEUROLOGIC: AOx 3,  Cranial nerves II-XII are grossly intact. Motor is 5 out of 5 bilaterally. Sensory is intact, no lateralizing findings. SKIN:  no bruising or bleeding, normal skin color, turgor, no redness, warmth, or swelling      Past Medical History:      Past Medical History:   Diagnosis Date    Hyperlipidemia     Hypertension     \"on medication since 2015\"     PSHX:  has a past surgical history that includes Knee Arthroplasty (Right, 2015); Abdominoplasty (10+ yrs ago); Breast reduction surgery (2012); Hysterectomy (age 44); Cholecystectomy, laparoscopic (05/10/2017); and Coronary angioplasty with stent. Allergies: Allergies   Allergen Reactions    Dye [Iodides] Hives     \"Broke out in hives and got real red and hot. \"    Ibuprofen Other (See Comments)     \"Slows heart rate way down\"       FAM HX: family history includes Heart Disease in her brother; No Known Problems in her

## 2019-06-17 VITALS
DIASTOLIC BLOOD PRESSURE: 59 MMHG | BODY MASS INDEX: 25.11 KG/M2 | HEIGHT: 67 IN | SYSTOLIC BLOOD PRESSURE: 111 MMHG | HEART RATE: 69 BPM | TEMPERATURE: 98.2 F | OXYGEN SATURATION: 98 % | WEIGHT: 160 LBS | RESPIRATION RATE: 15 BRPM

## 2019-06-17 LAB
CHOLESTEROL: 139 MG/DL
EKG ATRIAL RATE: 61 BPM
EKG ATRIAL RATE: 64 BPM
EKG DIAGNOSIS: NORMAL
EKG DIAGNOSIS: NORMAL
EKG P AXIS: 60 DEGREES
EKG P AXIS: 7 DEGREES
EKG P-R INTERVAL: 182 MS
EKG P-R INTERVAL: 186 MS
EKG Q-T INTERVAL: 398 MS
EKG Q-T INTERVAL: 424 MS
EKG QRS DURATION: 86 MS
EKG QRS DURATION: 90 MS
EKG QTC CALCULATION (BAZETT): 410 MS
EKG QTC CALCULATION (BAZETT): 426 MS
EKG R AXIS: -48 DEGREES
EKG R AXIS: 104 DEGREES
EKG T AXIS: 40 DEGREES
EKG T AXIS: 48 DEGREES
EKG VENTRICULAR RATE: 61 BPM
EKG VENTRICULAR RATE: 64 BPM
HCT VFR BLD CALC: 37.8 % (ref 37–47)
HDLC SERPL-MCNC: 36 MG/DL
HEMOGLOBIN: 12.3 GM/DL (ref 12.5–16)
LDL CHOLESTEROL DIRECT: 81 MG/DL
MCH RBC QN AUTO: 29.9 PG (ref 27–31)
MCHC RBC AUTO-ENTMCNC: 32.5 % (ref 32–36)
MCV RBC AUTO: 92 FL (ref 78–100)
PDW BLD-RTO: 12.5 % (ref 11.7–14.9)
PLATELET # BLD: 219 K/CU MM (ref 140–440)
PMV BLD AUTO: 10.5 FL (ref 7.5–11.1)
RBC # BLD: 4.11 M/CU MM (ref 4.2–5.4)
TRIGL SERPL-MCNC: 278 MG/DL
WBC # BLD: 4.1 K/CU MM (ref 4–10.5)

## 2019-06-17 PROCEDURE — 36415 COLL VENOUS BLD VENIPUNCTURE: CPT

## 2019-06-17 PROCEDURE — G0378 HOSPITAL OBSERVATION PER HR: HCPCS

## 2019-06-17 PROCEDURE — 85027 COMPLETE CBC AUTOMATED: CPT

## 2019-06-17 PROCEDURE — 93010 ELECTROCARDIOGRAM REPORT: CPT | Performed by: INTERNAL MEDICINE

## 2019-06-17 PROCEDURE — 6370000000 HC RX 637 (ALT 250 FOR IP): Performed by: NURSE PRACTITIONER

## 2019-06-17 PROCEDURE — 6370000000 HC RX 637 (ALT 250 FOR IP): Performed by: INTERNAL MEDICINE

## 2019-06-17 PROCEDURE — 93005 ELECTROCARDIOGRAM TRACING: CPT | Performed by: NURSE PRACTITIONER

## 2019-06-17 PROCEDURE — 83721 ASSAY OF BLOOD LIPOPROTEIN: CPT

## 2019-06-17 PROCEDURE — 80061 LIPID PANEL: CPT

## 2019-06-17 RX ORDER — METOPROLOL SUCCINATE 25 MG/1
25 TABLET, EXTENDED RELEASE ORAL
Status: DISCONTINUED | OUTPATIENT
Start: 2019-06-17 | End: 2019-06-17 | Stop reason: HOSPADM

## 2019-06-17 RX ORDER — MAGNESIUM SULFATE 1 G/100ML
1 INJECTION INTRAVENOUS ONCE
Status: DISCONTINUED | OUTPATIENT
Start: 2019-06-17 | End: 2019-06-17

## 2019-06-17 RX ORDER — LOSARTAN POTASSIUM AND HYDROCHLOROTHIAZIDE 12.5; 5 MG/1; MG/1
1 TABLET ORAL NIGHTLY
Status: DISCONTINUED | OUTPATIENT
Start: 2019-06-17 | End: 2019-06-17 | Stop reason: HOSPADM

## 2019-06-17 RX ORDER — METOPROLOL SUCCINATE 25 MG/1
25 TABLET, EXTENDED RELEASE ORAL
Status: DISCONTINUED | OUTPATIENT
Start: 2019-06-18 | End: 2019-06-17

## 2019-06-17 RX ADMIN — MAGNESIUM OXIDE TAB 400 MG (241.3 MG ELEMENTAL MG) 400 MG: 400 (241.3 MG) TAB at 11:03

## 2019-06-17 RX ADMIN — CLOPIDOGREL 75 MG: 75 TABLET, FILM COATED ORAL at 09:02

## 2019-06-17 RX ADMIN — METOPROLOL SUCCINATE 25 MG: 25 TABLET, EXTENDED RELEASE ORAL at 11:03

## 2019-06-17 RX ADMIN — ASPIRIN 81 MG 81 MG: 81 TABLET ORAL at 09:02

## 2019-06-17 ASSESSMENT — PAIN SCALES - GENERAL: PAINLEVEL_OUTOF10: 0

## 2019-06-17 NOTE — CONSULTS
[address] [place] [address], [address] [facility]                                  CONSULTATION    PATIENT NAME: Mike Martinez                     :        1950  MED REC NO:   [id number]                          ROOM:       3106  ACCOUNT NO:   [de-identified]                           ADMIT DATE: 2019  PROVIDER:     CONSTANCE Lane    CONSULT DATE:  2019    CHIEF COMPLAINT:  Chest pain and shortness of breath. HISTORY OF PRESENT ILLNESS:  This is a 70-year-old female who presents  this afternoon to the ER with chest pain, feeling dizzy, and shortness  of breath. She states that the chest pain started this morning around  09:00 a.m., felt like a chest pressure, like someone was squeezing her,  left-sided, radiating to her left arm. She states that the chest pain  is still going on now, although it is better, 4/10 pain currently, 7/10  pain earlier. She states that nothing seemed to have brought the pain  and nothing has seemed to help the pain so far. Associated symptoms  include shortness of breath. Denies nausea, vomiting, or diaphoresis. The patient actually had a PCI in 2019. Left heart catheterization  showed mild disease of the coronaries, 80% to 90% lesion in the OM that  was stented with a drug-eluting stent. She had an echo at that time  that showed EF 50% to 55% with no valvular heart disease. Since as an  outpatient, the patient has had a stress test that was negative for  ischemia, a Holter that was read as normal, and an AAA straining that  was read as normal.    PAST MEDICAL HISTORY:  Includes hyperlipidemia, hypertension, CAD,  status post PCI. PAST SURGICAL HISTORY:  Abdominoplasty, breast reduction surgery,  cholecystectomy, PCI, hysterectomy, and knee arthroplasty. FAMILY HISTORY:  Mother had a stroke, brother has heart disease. SOCIAL HISTORY:  The patient is a nonsmoker.     ALLERGIES:  The patient is allergic to DYE and IBUPROFEN. HOME MEDICATIONS:  The patient is on baby aspirin 81 mg, Nitrostat  tablet, atorvastatin 20 mg once a day, losartan with hydrochlorothiazide  50/12.5 mg, she takes two tablets once a day, Toprol 25 mg once a day,  Plavix 75 mg once a day, Mobic 15 mg once a day, and Allegra. REVIEW OF SYSTEMS:  A 10-point review of systems was reviewed and is  negative except as indicated in the HPI. PHYSICAL EXAMINATION:  GENERAL APPEARANCE:  A well-nourished, well-developed female, in no  acute distress, appears given age. HEENT:  Eyes:  PERRLA. No erythema, drainage, or conjunctivitis noted. Head:  Atraumatic and normocephalic. NECK:  Trachea midline. No obvious masses. LUNGS:  Clear to auscultation bilaterally. Good rise and fall of the  chest.  CARDIOVASCULAR:  Regular rate and rhythm. Normal S1 and S2. No  murmurs, rubs, or gallops auscultated. ABDOMEN:  Soft and nontender to palpation. SKIN:  Warm and dry. No rashes. NEUROLOGIC:  Alert add oriented. PSYCHIATRIC:  Appropriate mood and affect. LABORATORY DATA:  BNP, CBC, and LFTs are all within normal limits. Troponin has been drawn and the first one is negative. RADIOLOGY DATA:  EKG is normal sinus rhythm with left anterior  fascicular block. No significant change from previous. Chest x-ray is  read as no acute cardiopulmonary process. PLAN:  This is a 69-year-old female who presents to the ER with chest  pain and shortness of breath. She has a history of CAD, status post PCI  in 03/2019. She has been compliant with her medications and is taking  aspirin and Plavix at home. She states that she has been having chest  pain and shortness of breath even since she had the stent placed, but  got worse today. Recent stress negative, recent echo reassuring, recent  Holter reassuring. Troponin has been negative so far. We will admit  the patient for observation. Delta troponins.   Further recommendations  will come with the course of the patient.         CONSTANCE Villareal Ma    D: 06/16/2019 15:10:47       T: 06/16/2019 19:21:56     [email or web address]  Job#: [id number]     Doc#: [id number]    CC:  MD Radha Vilchis MD

## 2019-06-17 NOTE — PROGRESS NOTES
Daily Progress Note    Ortho BP negative  Home later today if stable  Ambulate in halls  Adjusted BP meds      Pt. Awake, alert and feeling better  HR stable, sinus dolores, BP stable-ortho positive  Denies further CP, SOB    CAD s/p PCI    PCI 3/19    Had episode of dizziness and CP/SOB yesterday    Trop neg. Recent holter and stress neg. Repeat echo today-will review    Cont. Med. Tx.-DAPT    Had positive ortho BP    Adjust meds today    Will cont. To follow    [facility]-3/19  IMPRESSION:  1. Left main is patent. 2.  Circ has mild disease, OM had 80% to 90% stenosis noted. LAD is a  calcified vessel, mild disease noted. 3.  RCA has mild disease noted. 4.  EF is 50%. EDP is around 20 mmHg present. PAST MEDICAL HISTORY:  Includes hyperlipidemia, hypertension, CAD,  status post PCI.     PAST SURGICAL HISTORY:  Abdominoplasty, breast reduction surgery,  cholecystectomy, PCI, hysterectomy, and knee arthroplasty.     FAMILY HISTORY:  Mother had a stroke, brother has heart disease.     SOCIAL HISTORY:  The patient is a nonsmoker.     ALLERGIES:  The patient is allergic to DYE and IBUPROFEN.     HOME MEDICATIONS:  The patient is on baby aspirin 81 mg, Nitrostat  tablet, atorvastatin 20 mg once a day, losartan with hydrochlorothiazide  50/12.5 mg, she takes two tablets once a day, Toprol 25 mg once a day,  Plavix 75 mg once a day, Mobic 15 mg once a day, and Allegra.         Objective:   /67   Pulse 59   Temp 98.2 °F (36.8 °C) (Oral)   Resp 19   Ht 5' 7\" (1.702 m)   Wt 160 lb (72.6 kg)   SpO2 98%   BMI 25.06 kg/m²       Intake/Output Summary (Last 24 hours) at 6/17/2019 1131  Last data filed at 6/17/2019 0301  Gross per 24 hour   Intake 240 ml   Output --   Net 240 ml       Medications:   Scheduled Meds:   magnesium oxide  400 mg Oral Daily    losartan-hydrochlorothiazide  1 tablet Oral Nightly    metoprolol succinate  25 mg Oral QAM AC    aspirin  81 mg Oral Daily    clopidogrel  75 mg Oral Daily    sodium chloride flush  10 mL Intravenous 2 times per day    atorvastatin  40 mg Oral Nightly    enoxaparin  40 mg Subcutaneous Daily      Infusions:     PRN Meds:  nitroGLYCERIN, sodium chloride flush, magnesium hydroxide, ondansetron, acetaminophen       Physical Exam:  Vitals:    06/17/19 0900   BP: 104/67   Pulse: 59   Resp: 19   Temp: 98.2 °F (36.8 °C)   SpO2: 98%        General: AAO, NAD  Chest: Nontender  Cardiac: First and Second Heart Sounds are Normal, No Murmurs or Gallops noted  Lungs:Clear to auscultation and percussion. Abdomen: Soft, NT, ND, +BS  Extremities: No clubbing, no edema  Vascular:  Equal 2+ peripheral pulses. Lab Data:  CBC:   Recent Labs     06/16/19  1230 06/17/19  0434   WBC 4.4 4.1   HGB 12.4* 12.3*   HCT 38.8 37.8   MCV 93.9 92.0    219     BMP:   Recent Labs     06/16/19  1230      K 4.2      CO2 26   BUN 19   CREATININE 1.1     LIVER PROFILE:   Recent Labs     06/16/19  1230   AST 16   ALT 11   BILITOT 0.4   ALKPHOS 70     PT/INR: No results for input(s): PROTIME, INR in the last 72 hours. APTT: No results for input(s): APTT in the last 72 hours. BNP:  No results for input(s): BNP in the last 72 hours.       Assessment:  Patient Active Problem List    Diagnosis Date Noted    Chest pain 06/16/2019    Unstable angina ([address] 03/15/2019    Hypertelorism 03/15/2019    Sigmoid diverticulitis     Calculus of gallbladder with acute on chronic cholecystitis without obstruction 05/10/2017    RUQ abdominal pain        Electronically signed by Ryanne Everett PA-C on 6/17/2019 at 11:31 AM

## 2019-06-17 NOTE — PROGRESS NOTES
HOSPITALIST PROGRESS NOTE  Date: 6/17/2019   Name: Jeanmarie Salgado   MRN: [id number]   YOB: 1950      Subjective/Interval Hx:   And at this time denies chest pain, shortness of breath or dyspnea with exertion. Will need to be evaluated by cardiology. Objective:   Physical Exam:   /67   Pulse 59   Temp 98.2 °F (36.8 °C) (Oral)   Resp 19   Ht 5' 7\" (1.702 m)   Wt 160 lb (72.6 kg)   SpO2 98%   BMI 25.06 kg/m²   General: no acute distress, well nourished and well hydrated  HEENT: NCAT  Heart: S1S2 RRR  Lungs: Clear to ascultation bilaterally, respiratory effort normal  Abdomen: soft, NT/ND, positive bowel sounds  Extremities: no pitting edema, nontender   Neuro: patient is awake, alert and orientated times 3, no gross deficits  Skin: no rashes or ecchymosis        Meds:   Meds:    magnesium oxide  400 mg Oral Daily    losartan-hydrochlorothiazide  1 tablet Oral Nightly    metoprolol succinate  25 mg Oral QAM AC    aspirin  81 mg Oral Daily    clopidogrel  75 mg Oral Daily    sodium chloride flush  10 mL Intravenous 2 times per day    atorvastatin  40 mg Oral Nightly    enoxaparin  40 mg Subcutaneous Daily      Infusions:   PRN Meds:   nitroGLYCERIN 0.4 mg Q5 Min PRN   sodium chloride flush 10 mL PRN   magnesium hydroxide 30 mL Daily PRN   ondansetron 4 mg Q6H PRN   acetaminophen 650 mg Q4H PRN       Data/Labs:     Recent Labs     06/16/19  1230 06/17/19  0434   WBC 4.4 4.1   HGB 12.4* 12.3*   HCT 38.8 37.8    219      Recent Labs     06/16/19  1230      K 4.2      CO2 26   BUN 19   CREATININE 1.1     Recent Labs     06/16/19  1230   AST 16   ALT 11   BILITOT 0.4   ALKPHOS 70     No results for input(s): INR in the last 72 hours. Recent Labs     06/16/19  1230 06/16/19  1631 06/16/19  2133   TROPONINT <0.010 <0.010 <0.010       I/O last 3 completed shifts:   In: 240 [P.O.:240]  Out: -     Intake/Output Summary (Last 24 hours) at 6/17/2019 1563  Last data filed at 6/17/2019 0301  Gross per 24 hour   Intake 240 ml   Output --   Net 240 ml        Assessment/Plan:   Acute chest pain-Initial troponin negative. EKG with no ischemic changes. Hx of CAD s/p PCI of OM (3/2019).  Last echo with EF 50-55% and grade I DD (3/2019)  -Continue ASA, Plavix, Lipitor, Nitro, BB, and ARB's  -Serial troponin  -Consult cardiology    Hypertension-losartan with hydrochlorothiazide nightly, metoprolol, monitor blood pressure    CAD- plavix and aspirin    Hyperlipidemia-Lipitor at night    DVT Prophylaxis: lovenox  Diet: DIET CARDIAC; No Caffeine  Code Status: Full Code    Dispo: when stable ( probably tomorrow is testing is negative )    Electronically signed by Alexa Selby MD on 6/17/2019 at 12:40 PM

## 2019-06-17 NOTE — CONSULTS
1 [address], [address] [facility]                                  CONSULTATION    PATIENT NAME: Gen Bezn                     :        1950  MED REC NO:   [id number]                          ROOM:       3106  ACCOUNT NO:   [de-identified]                           ADMIT DATE: 2019  PROVIDER:     Sowmya Garrett MD    CONSULT DATE:  2019    INDICATIONS:  Chest pain. HISTORY OF PRESENT ILLNESS:  This is a 76-year-old female patient who  came to the hospital yesterday with having chest pain present. She said  she got up yesterday, she tried to move around, she became weak, and she  fell. There is a questionable syncope present. She also has chest pain  present. Right now, she is feeling better. The patient had undergone a heart catheterization done in 2019. LV  function was preserved, but the patient had circ disease, which was  stented. I think she had symptoms present; therefore, her stress test  was in 2019, showed LV function was preserved and no ischemia was  noted. She was treated medically. She had an echo done back in 2019  also. LV function was preserved. Holter in 2019 showed sinus rhythm  with PACs and PVCs present. This morning, she is feeling better. PAST MEDICAL HISTORY:  She is known to have coronary artery disease,  status post angioplasty done in 2019. Left main was patent, LAD had  mild disease, circ had mild disease, OM was stented. History of having  hypertension and hyperlipidemia present. PAST SURGICAL HISTORY:  Angioplasty of the circ. SOCIAL HISTORY:  She does not smoke, does not drink. ALLERGIES:  IVP DYE and IBUPROFEN. MEDICATIONS AT HOME:  She was on aspirin, Lipitor, Hyzaar, Toprol,  Plavix, and Mobic. PHYSICAL EXAMINATION:  GENERAL:  The patient is awake and alert, answering questions, not in  acute distress.   VITAL SIGNS:  Temperature is

## 2019-06-17 NOTE — CARE COORDINATION
.CM met with pt for d/c planning. Introduced self and updated white board. Pt lives with spouse and is independent with ADL's. Pt drives, has a PCP, has insurance, and is able to afford medication. Pt denies having any DME or services. [facility] offered and pt refused. Pt denies any needs at this time. D/c plan is home with spouse, no needs. CM will continue to follow.   TE

## 2019-06-17 NOTE — DISCHARGE SUMMARY
Discharge Summary    Patient ID   Tammy Carnes   1950  [id number]    Primary Care:   Sophie Plummer MD    Admit date: 6/16/2019   Discharge date: 6/17/2019    Medical Record number: [id number]   Admitting Physician: Ho Castaneda MD   Discharge Physician: Ho Castaneda MD    Consultants: cardiology    Procedures: none    Discharge Diagnoses:      Patient Active Problem List   Diagnosis Code    RUQ abdominal pain R10.11    Calculus of gallbladder with acute on chronic cholecystitis without obstruction K80.12    Sigmoid diverticulitis K57.32    Unstable angina ([address] I20.0    Hypertelorism Q75.2    Chest pain R07.9         Hospital Course:  Ms Cezar Sims is a 78 YF who presented to the emergency department due to chest pain her course in the hospital as listed below  Acute chest pain-Initial troponin negative. EKG with no ischemic changes. Hx of CAD s/p PCI of OM (3/2019). Last echo with EF 50-55% and grade I DD (3/2019)  -Continue ASA, Plavix, Lipitor, Nitro, BB, and ARB's  -Serial troponin  -Consult cardiology     Hypertension-losartan with hydrochlorothiazide nightly, metoprolol, monitor blood pressure     CAD- plavix and aspirin     Hyperlipidemia-Lipitor at night     She improved with medical management and cardiology has cleared her to be discharged. She is to follow-up with her primary care provider in the outpatient setting.     Physical Exam:   /67   Pulse 59   Temp 98.2 °F (36.8 °C) (Oral)   Resp 19   Ht 5' 7\" (1.702 m)   Wt 160 lb (72.6 kg)   SpO2 98%   BMI 25.06 kg/m²   Neck: no JVD  Lungs: equal BS, clear   CV: RRR, normal S1S2, no significant murmur  Abdomen: soft, nontender, normally active BS, no masses or tenderness  Extremities: no edema or cords  Neurologic: alert, oriented, no focal CN or motor deficit        Medications: see computerized discharge medication list     Medication List      CONTINUE taking these medications    ALLEGRA PO     aspirin 81 MG chewable tablet  Take 1 tablet by mouth daily     atorvastatin 20 MG tablet  Commonly known as:  LIPITOR  Take 1 tablet by mouth nightly     clopidogrel 75 MG tablet  Commonly known as:  PLAVIX  Take 1 tablet by mouth daily     losartan-hydrochlorothiazide 50-12.5 MG per tablet  Commonly known as:  HYZAAR  Take 2 tablets by mouth nightly     metoprolol succinate 25 MG extended release tablet  Commonly known as:  TOPROL XL  Take 1 tablet by mouth nightly     MOBIC 15 MG tablet  Generic drug:  meloxicam     nitroGLYCERIN 0.4 MG SL tablet  Commonly known as:  NITROSTAT  up to max of 3 total doses. If no relief after 1 dose, call 911.           Patient Instructions: Medications any changes while in the hospital      Discharged Condition: good  Disposition: home  Activity: activity as tolerated  Diet: cardiac diet  Wound Care: none needed    Follow-up: Dr. Milan Cowan in 1-2 weeks         Electronically signed by Mojgan Rojas MD on 6/17/2019 at 2:58 PM    Time spent on discharge 22  minutes

## 2019-09-20 ENCOUNTER — HOSPITAL ENCOUNTER (OUTPATIENT)
Dept: CT IMAGING | Age: 69
Discharge: HOME | End: 2019-09-20
Payer: MEDICARE

## 2019-09-20 DIAGNOSIS — R22.1 NECK MASS: ICD-10-CM

## 2019-09-27 ENCOUNTER — HOSPITAL ENCOUNTER (OUTPATIENT)
Dept: CT IMAGING | Age: 69
Discharge: HOME | End: 2019-09-27
Payer: MEDICARE

## 2019-09-27 DIAGNOSIS — R22.1 NECK MASS: ICD-10-CM

## 2019-09-27 LAB
GFR AFRICAN AMERICAN: >60 ML/MIN/1.73M2
GFR NON-AFRICAN AMERICAN: 58 ML/MIN/1.73M2
POC CREATININE: 1 MG/DL (ref 0.6–1.1)

## 2019-09-27 PROCEDURE — 6360000004 HC RX CONTRAST MEDICATION: Performed by: OTOLARYNGOLOGY

## 2019-09-27 PROCEDURE — 70491 CT SOFT TISSUE NECK W/DYE: CPT

## 2019-09-27 RX ADMIN — IOPAMIDOL 75 ML: 755 INJECTION, SOLUTION INTRAVENOUS at 09:22

## 2021-02-19 ENCOUNTER — HOSPITAL ENCOUNTER (OUTPATIENT)
Dept: LAB | Age: 71
Discharge: HOME | End: 2021-02-19
Payer: MEDICARE

## 2021-02-19 LAB
ANION GAP SERPL CALCULATED.3IONS-SCNC: 8 MMOL/L (ref 4–16)
APTT: 28.9 SECONDS (ref 25.1–37.1)
BASOPHILS ABSOLUTE: 0.1 K/CU MM
BASOPHILS RELATIVE PERCENT: 1.1 % (ref 0–1)
BUN BLDV-MCNC: 20 MG/DL (ref 6–23)
CALCIUM SERPL-MCNC: 10.6 MG/DL (ref 8.3–10.6)
CHLORIDE BLD-SCNC: 98 MMOL/L (ref 99–110)
CO2: 30 MMOL/L (ref 21–32)
CREAT SERPL-MCNC: 1.3 MG/DL (ref 0.6–1.1)
DIFFERENTIAL TYPE: ABNORMAL
EOSINOPHILS ABSOLUTE: 0.1 K/CU MM
EOSINOPHILS RELATIVE PERCENT: 2.8 % (ref 0–3)
GFR AFRICAN AMERICAN: 49 ML/MIN/1.73M2
GFR NON-AFRICAN AMERICAN: 40 ML/MIN/1.73M2
GLUCOSE BLD-MCNC: 104 MG/DL (ref 70–99)
HCT VFR BLD CALC: 39.7 % (ref 37–47)
HEMOGLOBIN: 12.9 GM/DL (ref 12.5–16)
IMMATURE NEUTROPHIL %: 0.2 % (ref 0–0.43)
INR BLD: 0.94 INDEX
LYMPHOCYTES ABSOLUTE: 1.2 K/CU MM
LYMPHOCYTES RELATIVE PERCENT: 24.7 % (ref 24–44)
MCH RBC QN AUTO: 29.5 PG (ref 27–31)
MCHC RBC AUTO-ENTMCNC: 32.5 % (ref 32–36)
MCV RBC AUTO: 90.8 FL (ref 78–100)
MONOCYTES ABSOLUTE: 0.3 K/CU MM
MONOCYTES RELATIVE PERCENT: 6.9 % (ref 0–4)
NUCLEATED RBC %: 0 %
PDW BLD-RTO: 12.8 % (ref 11.7–14.9)
PLATELET # BLD: 283 K/CU MM (ref 140–440)
PMV BLD AUTO: 10.4 FL (ref 7.5–11.1)
POTASSIUM SERPL-SCNC: 4 MMOL/L (ref 3.5–5.1)
PROTHROMBIN TIME: 11.4 SECONDS (ref 11.7–14.5)
RBC # BLD: 4.37 M/CU MM (ref 4.2–5.4)
SEGMENTED NEUTROPHILS ABSOLUTE COUNT: 3 K/CU MM
SEGMENTED NEUTROPHILS RELATIVE PERCENT: 64.3 % (ref 36–66)
SODIUM BLD-SCNC: 136 MMOL/L (ref 135–145)
TOTAL IMMATURE NEUTOROPHIL: 0.01 K/CU MM
TOTAL NUCLEATED RBC: 0 K/CU MM
WBC # BLD: 4.7 K/CU MM (ref 4–10.5)

## 2021-02-19 PROCEDURE — 85610 PROTHROMBIN TIME: CPT

## 2021-02-19 PROCEDURE — 85730 THROMBOPLASTIN TIME PARTIAL: CPT

## 2021-02-19 PROCEDURE — C9803 HOPD COVID-19 SPEC COLLECT: HCPCS

## 2021-02-19 PROCEDURE — 80048 BASIC METABOLIC PNL TOTAL CA: CPT

## 2021-02-19 PROCEDURE — U0002 COVID-19 LAB TEST NON-CDC: HCPCS

## 2021-02-19 PROCEDURE — 85025 COMPLETE CBC W/AUTO DIFF WBC: CPT

## 2021-02-20 LAB
SARS-COV-2: NOT DETECTED
SOURCE: NORMAL

## 2021-02-23 ENCOUNTER — HOSPITAL ENCOUNTER (OUTPATIENT)
Dept: CARDIAC CATH/INVASIVE PROCEDURES | Age: 71
Setting detail: OUTPATIENT SURGERY
Discharge: HOME | End: 2021-02-23
Attending: INTERNAL MEDICINE | Admitting: INTERNAL MEDICINE
Payer: MEDICARE

## 2021-02-23 VITALS
WEIGHT: 146 LBS | OXYGEN SATURATION: 99 % | BODY MASS INDEX: 22.91 KG/M2 | HEIGHT: 67 IN | DIASTOLIC BLOOD PRESSURE: 68 MMHG | TEMPERATURE: 97.4 F | RESPIRATION RATE: 16 BRPM | HEART RATE: 75 BPM | SYSTOLIC BLOOD PRESSURE: 120 MMHG

## 2021-02-23 PROBLEM — Z98.62 S/P ANGIOPLASTY: Status: ACTIVE | Noted: 2021-02-23

## 2021-02-23 LAB — ACTIVATED CLOTTING TIME, LOW RANGE: 379 SEC

## 2021-02-23 PROCEDURE — C9600 PERC DRUG-EL COR STENT SING: HCPCS

## 2021-02-23 PROCEDURE — 6360000004 HC RX CONTRAST MEDICATION

## 2021-02-23 PROCEDURE — 2709999900 HC NON-CHARGEABLE SUPPLY

## 2021-02-23 PROCEDURE — C1887 CATHETER, GUIDING: HCPCS

## 2021-02-23 PROCEDURE — 93458 L HRT ARTERY/VENTRICLE ANGIO: CPT

## 2021-02-23 PROCEDURE — 6370000000 HC RX 637 (ALT 250 FOR IP)

## 2021-02-23 PROCEDURE — 2580000003 HC RX 258: Performed by: INTERNAL MEDICINE

## 2021-02-23 PROCEDURE — 6360000002 HC RX W HCPCS

## 2021-02-23 PROCEDURE — 6360000002 HC RX W HCPCS: Performed by: INTERNAL MEDICINE

## 2021-02-23 PROCEDURE — C1769 GUIDE WIRE: HCPCS

## 2021-02-23 PROCEDURE — C1874 STENT, COATED/COV W/DEL SYS: HCPCS

## 2021-02-23 PROCEDURE — 6370000000 HC RX 637 (ALT 250 FOR IP): Performed by: INTERNAL MEDICINE

## 2021-02-23 PROCEDURE — C1894 INTRO/SHEATH, NON-LASER: HCPCS

## 2021-02-23 PROCEDURE — C1725 CATH, TRANSLUMIN NON-LASER: HCPCS

## 2021-02-23 PROCEDURE — 85347 COAGULATION TIME ACTIVATED: CPT

## 2021-02-23 RX ORDER — ATROPINE SULFATE 0.4 MG/ML
0.5 AMPUL (ML) INJECTION
Status: DISCONTINUED | OUTPATIENT
Start: 2021-02-23 | End: 2021-02-23 | Stop reason: HOSPADM

## 2021-02-23 RX ORDER — MORPHINE SULFATE 2 MG/ML
1 INJECTION, SOLUTION INTRAMUSCULAR; INTRAVENOUS
Status: DISCONTINUED | OUTPATIENT
Start: 2021-02-23 | End: 2021-02-23 | Stop reason: HOSPADM

## 2021-02-23 RX ORDER — SODIUM CHLORIDE 0.9 % (FLUSH) 0.9 %
10 SYRINGE (ML) INJECTION PRN
Status: DISCONTINUED | OUTPATIENT
Start: 2021-02-23 | End: 2021-02-23 | Stop reason: HOSPADM

## 2021-02-23 RX ORDER — ACETAMINOPHEN 325 MG/1
650 TABLET ORAL EVERY 4 HOURS PRN
Status: DISCONTINUED | OUTPATIENT
Start: 2021-02-23 | End: 2021-02-23 | Stop reason: HOSPADM

## 2021-02-23 RX ORDER — DIAZEPAM 5 MG/1
5 TABLET ORAL ONCE
Status: COMPLETED | OUTPATIENT
Start: 2021-02-23 | End: 2021-02-23

## 2021-02-23 RX ORDER — SODIUM CHLORIDE 0.9 % (FLUSH) 0.9 %
10 SYRINGE (ML) INJECTION EVERY 12 HOURS SCHEDULED
Status: DISCONTINUED | OUTPATIENT
Start: 2021-02-23 | End: 2021-02-23 | Stop reason: HOSPADM

## 2021-02-23 RX ORDER — DIPHENHYDRAMINE HCL 25 MG
25 TABLET ORAL ONCE
Status: COMPLETED | OUTPATIENT
Start: 2021-02-23 | End: 2021-02-23

## 2021-02-23 RX ORDER — SODIUM CHLORIDE 9 MG/ML
INJECTION, SOLUTION INTRAVENOUS CONTINUOUS
Status: DISCONTINUED | OUTPATIENT
Start: 2021-02-23 | End: 2021-02-23 | Stop reason: HOSPADM

## 2021-02-23 RX ORDER — METHYLPREDNISOLONE SODIUM SUCCINATE 125 MG/2ML
125 INJECTION, POWDER, LYOPHILIZED, FOR SOLUTION INTRAMUSCULAR; INTRAVENOUS ONCE
Status: COMPLETED | OUTPATIENT
Start: 2021-02-23 | End: 2021-02-23

## 2021-02-23 RX ORDER — SODIUM CHLORIDE 9 MG/ML
INJECTION, SOLUTION INTRAVENOUS CONTINUOUS
Status: DISCONTINUED | OUTPATIENT
Start: 2021-02-23 | End: 2021-02-23 | Stop reason: SDUPTHER

## 2021-02-23 RX ADMIN — DIPHENHYDRAMINE HYDROCHLORIDE 25 MG: 25 TABLET ORAL at 08:29

## 2021-02-23 RX ADMIN — DIAZEPAM 5 MG: 5 TABLET ORAL at 08:29

## 2021-02-23 RX ADMIN — SODIUM CHLORIDE: 9 INJECTION, SOLUTION INTRAVENOUS at 08:29

## 2021-02-23 RX ADMIN — METHYLPREDNISOLONE SODIUM SUCCINATE 125 MG: 125 INJECTION, POWDER, FOR SOLUTION INTRAMUSCULAR; INTRAVENOUS at 08:44

## 2021-02-23 ASSESSMENT — PAIN DESCRIPTION - DESCRIPTORS: DESCRIPTORS: PRESSURE

## 2021-02-23 ASSESSMENT — PAIN SCALES - GENERAL: PAINLEVEL_OUTOF10: 4

## 2021-02-23 ASSESSMENT — PAIN DESCRIPTION - PAIN TYPE: TYPE: ACUTE PAIN

## 2021-02-23 NOTE — H&P
1 [address], [address] [place] [address]                              HISTORY AND PHYSICAL    PATIENT NAME: Herson Alcantar                     :        1950  MED REC NO:   [id number]                          ROOM:  ACCOUNT NO:   [de-identified]                           ADMIT DATE: 2021  PROVIDER:     Suzanne Marinelli MD    INDICATION:  Abnormal stress test and chest pain. HISTORY OF PRESENT ILLNESS:  This is a 30-year-old female patient of Dr. Adolfo Catherine. History of coronary artery disease present. She has been  having chest pain and shortness of breath present and getting  progressively short of breath. She also had abnormal stress test.   Therefore she is here for heart catheterization. She had a heart catheterization done back in 2019. Left main was  patent. LAD had mild disease. Circ had mild disease. OM had 80%  stenosis which was stented with 2.5 x 12 mm stent. RCA had mild disease  noted and radial approach was done last time. PAST MEDICAL HISTORY:  Coronary artery disease, angioplasty done of OM  back in 2019. Hypertension, hyperlipidemia present. PAST SURGICAL HISTORY:  Angioplasty of the circumflex artery. SOCIAL HISTORY:  She does not smoke, does not drink. ALLERGIES:  IODINE and IBUPROFEN. MEDICATIONS:  She is on aspirin. She is able to tolerate that. Lipitor, Hyzaar 50/12.5 mg once a day, Toprol 25 mg once a day, Plavix. PHYSICAL EXAMINATION:  GENERAL:  The patient is awake and alert, answering questions, not in  acute distress. VITAL SIGNS:  Temperature is afebrile, pulse is 60, blood pressure is  133/77. HEENT:  Head is normocephalic and atraumatic. Pupils are equal and  reactive. CHEST:  Equal expansion. LUNGS:  Clear to auscultation. No wheezing or rhonchi. HEART:  Regular rhythm. ABDOMEN:  Soft and nontender. Bowel sounds are present.   No hepatosplenomegaly or guarding appreciated. EXTREMITIES:  No cyanosis or clubbing noted. NEUROLOGIC:  Cranial nerves II through XII are grossly intact. LABORATORY DATA:  BUN is 20, creatinine 1.3. CBC is normal.    IMPRESSION AND PLAN:  This is a 50-year-old female patient with history  of coronary artery disease. She has abnormal stress test, ongoing  symptoms. Plan for heart cath. We will make further recommendations  based on the hospital course. She has been pretreated for iodine  allergy.         River Chase MD    D: 02/23/2021 8:25:29       T: 02/23/2021 9:34:42     [id number]  Job#: [id number]     Doc#: [id number]    CC:

## 2021-02-23 NOTE — PROCEDURES
[address] [place] [address], [address] [place] [address]                            CARDIAC CATHETERIZATION    PATIENT NAME: Radha Mcconnell                     :        1950  MED REC NO:   [id number]                          ROOM:  ACCOUNT NO:   [de-identified]                           ADMIT DATE: 2021  PROVIDER:     Jonh Roach MD    DATE OF PROCEDURE:  2021    INDICATION:  Unstable angina. This is a 69-year-old female patient who had a history of coronary  artery disease status post angioplasty done. She has been having chest  pain present. Stress test was abnormal.  Therefore cardiac  catheterization was performed. DESCRIPTION OF PROCEDURE:  The patient was brought to cath lab today. Informed consent was obtained from the patient. The patient was prepped  and draped in a sterile fashion. The patient was injected with 5 mL of  2% lidocaine in the right radial region. Using a radial needle, right  radial artery was canalized and a 5/6-Gambian sheath was placed in the  right radial artery. The entire procedure was done using guidewire. The sheath was flushed in between the procedures. Using a JL3.5 catheter, left coronary angiogram was performed. Left  coronary angiogram revealed the left main is patent. It bifurcates in  the LAD and circumflex artery. Circ is medium-sized vessel and mild  disease noted. OM stent is widely patent. It is a medium-sized vessel. LAD is a medium-sized vessel, reaches and wraps the apex. LAD has a mid  to distal long tubular 80% stenosis noted. It is a calcified vessel. LAD reaches and wraps the apex and gives off a medium-sized diagonal  branch. Mild disease noted in the diagonal branch. Using AR MOD catheter, right coronary angiogram was performed.   Right  coronary angiogram revealed the right coronary artery is a medium-sized vessel, is a dominant vessel and gives off a PDA and PL branch. Mild  disease noted. EDP is around 20 mmHg present. On the pullback, there  was no gradient present across the aortic valve. IMPRESSION:  1. EDP is around 20 mmHg present. 2.  Right coronary artery disease has mild disease noted. 3.  Left main is patent. 4.  Circ has mild disease. OM stent is widely patent. LAD has mid to  distal long 80% stenosis noted. The plan is to proceed with intervention of the LAD. The patient already had 6-Persian sheath. The patient was anticoagulated  with heparin. ACT was kept greater than 250. The patient received  Plavix 300 mg postprocedure and aspirin 325 mg postprocedure. The  patient is already on Plavix and aspirin. Then using a VL3 guide, left main was engaged. BMW Elite wire was used  to cross into LAD. Lesion was predilated with 2.0 x 20 mm balloon. A  GuideLiner was used to help support. Lesion was stented with a long  drug-eluting stent Xience 2.25 x 33 mm stent. Stent was deployed at  high pressure almost to 2.45. The final angiogram showed there is  BROOKE-3 flow noted. No dissection, perforation, or distal embolization  noted. IMPRESSION:  Successful balloon angioplasty of the mid to distal LAD  with a long drug-eluting stent Xience 2.25 x 33 mm stent. Stent was  deployed to high pressure to almost 2.4 mm. The final angiogram showed  the BROOKE-3 flow noted. No dissection, perforation, or distal  embolization noted. The patient tolerated the procedure well. No complications noted. The  patient will be observed. If stable will be discharged home later today  with a close followup tomorrow.     Blood loss 20cc  Cardiac rehab     Adair Nesbitt MD    D: 02/23/2021 9:49:26       T: 02/23/2021 10:37:07     [id number]  Job#: [id number]     Doc#: [id number]    CC:

## 2021-05-17 ENCOUNTER — APPOINTMENT (OUTPATIENT)
Dept: NUCLEAR MEDICINE | Age: 71
End: 2021-05-17
Payer: MEDICARE

## 2021-05-17 ENCOUNTER — HOSPITAL ENCOUNTER (OUTPATIENT)
Age: 71
Setting detail: OBSERVATION
Discharge: HOME | End: 2021-05-18
Attending: HOSPITALIST | Admitting: HOSPITALIST
Payer: MEDICARE

## 2021-05-17 ENCOUNTER — APPOINTMENT (OUTPATIENT)
Dept: GENERAL RADIOLOGY | Age: 71
End: 2021-05-17
Payer: MEDICARE

## 2021-05-17 DIAGNOSIS — R07.9 CHEST PAIN, UNSPECIFIED TYPE: Primary | ICD-10-CM

## 2021-05-17 PROBLEM — R07.89 OTHER CHEST PAIN: Status: ACTIVE | Noted: 2021-05-17

## 2021-05-17 LAB
ALBUMIN SERPL-MCNC: 4.5 GM/DL (ref 3.4–5)
ALP BLD-CCNC: 70 IU/L (ref 40–129)
ALT SERPL-CCNC: 9 U/L (ref 10–40)
ANION GAP SERPL CALCULATED.3IONS-SCNC: 7 MMOL/L (ref 4–16)
APTT: 27.8 SECONDS (ref 25.1–37.1)
AST SERPL-CCNC: 15 IU/L (ref 15–37)
BASOPHILS ABSOLUTE: 0 K/CU MM
BASOPHILS RELATIVE PERCENT: 1 % (ref 0–1)
BILIRUB SERPL-MCNC: 0.6 MG/DL (ref 0–1)
BUN BLDV-MCNC: 20 MG/DL (ref 6–23)
CALCIUM SERPL-MCNC: 9.7 MG/DL (ref 8.3–10.6)
CHLORIDE BLD-SCNC: 103 MMOL/L (ref 99–110)
CO2: 28 MMOL/L (ref 21–32)
CREAT SERPL-MCNC: 1.3 MG/DL (ref 0.6–1.1)
D DIMER: <200 NG/ML(DDU)
DIFFERENTIAL TYPE: ABNORMAL
EOSINOPHILS ABSOLUTE: 0.1 K/CU MM
EOSINOPHILS RELATIVE PERCENT: 2.8 % (ref 0–3)
GFR AFRICAN AMERICAN: 49 ML/MIN/1.73M2
GFR NON-AFRICAN AMERICAN: 40 ML/MIN/1.73M2
GLUCOSE BLD-MCNC: 103 MG/DL (ref 70–99)
HCT VFR BLD CALC: 36.9 % (ref 37–47)
HEMOGLOBIN: 12 GM/DL (ref 12.5–16)
IMMATURE NEUTROPHIL %: 0.3 % (ref 0–0.43)
INR BLD: 0.95 INDEX
LACTATE: 1 MMOL/L (ref 0.4–2)
LV EF: 53 %
LVEF MODALITY: NORMAL
LYMPHOCYTES ABSOLUTE: 1.1 K/CU MM
LYMPHOCYTES RELATIVE PERCENT: 28 % (ref 24–44)
MAGNESIUM: 1.7 MG/DL (ref 1.8–2.4)
MCH RBC QN AUTO: 30.2 PG (ref 27–31)
MCHC RBC AUTO-ENTMCNC: 32.5 % (ref 32–36)
MCV RBC AUTO: 92.7 FL (ref 78–100)
MONOCYTES ABSOLUTE: 0.4 K/CU MM
MONOCYTES RELATIVE PERCENT: 9.6 % (ref 0–4)
NUCLEATED RBC %: 0 %
PDW BLD-RTO: 12.9 % (ref 11.7–14.9)
PLATELET # BLD: 250 K/CU MM (ref 140–440)
PMV BLD AUTO: 10 FL (ref 7.5–11.1)
POTASSIUM SERPL-SCNC: 4 MMOL/L (ref 3.5–5.1)
PRO-BNP: 313.4 PG/ML
PROTHROMBIN TIME: 11.5 SECONDS (ref 11.7–14.5)
RBC # BLD: 3.98 M/CU MM (ref 4.2–5.4)
SEGMENTED NEUTROPHILS ABSOLUTE COUNT: 2.3 K/CU MM
SEGMENTED NEUTROPHILS RELATIVE PERCENT: 58.3 % (ref 36–66)
SODIUM BLD-SCNC: 138 MMOL/L (ref 135–145)
TOTAL IMMATURE NEUTOROPHIL: 0.01 K/CU MM
TOTAL NUCLEATED RBC: 0 K/CU MM
TOTAL PROTEIN: 6.7 GM/DL (ref 6.4–8.2)
TROPONIN T: <0.01 NG/ML
TROPONIN T: <0.01 NG/ML
WBC # BLD: 4 K/CU MM (ref 4–10.5)

## 2021-05-17 PROCEDURE — 85379 FIBRIN DEGRADATION QUANT: CPT

## 2021-05-17 PROCEDURE — A9500 TC99M SESTAMIBI: HCPCS | Performed by: INTERNAL MEDICINE

## 2021-05-17 PROCEDURE — 2580000003 HC RX 258: Performed by: NURSE PRACTITIONER

## 2021-05-17 PROCEDURE — 78452 HT MUSCLE IMAGE SPECT MULT: CPT

## 2021-05-17 PROCEDURE — G0378 HOSPITAL OBSERVATION PER HR: HCPCS

## 2021-05-17 PROCEDURE — 93306 TTE W/DOPPLER COMPLETE: CPT

## 2021-05-17 PROCEDURE — 83880 ASSAY OF NATRIURETIC PEPTIDE: CPT

## 2021-05-17 PROCEDURE — 85730 THROMBOPLASTIN TIME PARTIAL: CPT

## 2021-05-17 PROCEDURE — 85610 PROTHROMBIN TIME: CPT

## 2021-05-17 PROCEDURE — 71045 X-RAY EXAM CHEST 1 VIEW: CPT

## 2021-05-17 PROCEDURE — 36415 COLL VENOUS BLD VENIPUNCTURE: CPT

## 2021-05-17 PROCEDURE — 99285 EMERGENCY DEPT VISIT HI MDM: CPT

## 2021-05-17 PROCEDURE — 85025 COMPLETE CBC W/AUTO DIFF WBC: CPT

## 2021-05-17 PROCEDURE — 84484 ASSAY OF TROPONIN QUANT: CPT

## 2021-05-17 PROCEDURE — 6360000002 HC RX W HCPCS: Performed by: INTERNAL MEDICINE

## 2021-05-17 PROCEDURE — 6370000000 HC RX 637 (ALT 250 FOR IP): Performed by: NURSE PRACTITIONER

## 2021-05-17 PROCEDURE — 93005 ELECTROCARDIOGRAM TRACING: CPT | Performed by: EMERGENCY MEDICINE

## 2021-05-17 PROCEDURE — 80053 COMPREHEN METABOLIC PANEL: CPT

## 2021-05-17 PROCEDURE — 83735 ASSAY OF MAGNESIUM: CPT

## 2021-05-17 PROCEDURE — 6370000000 HC RX 637 (ALT 250 FOR IP): Performed by: PHYSICIAN ASSISTANT

## 2021-05-17 PROCEDURE — 96365 THER/PROPH/DIAG IV INF INIT: CPT

## 2021-05-17 PROCEDURE — 3430000000 HC RX DIAGNOSTIC RADIOPHARMACEUTICAL: Performed by: INTERNAL MEDICINE

## 2021-05-17 PROCEDURE — 83605 ASSAY OF LACTIC ACID: CPT

## 2021-05-17 RX ORDER — NITROGLYCERIN 0.4 MG/1
0.4 TABLET SUBLINGUAL 2 TIMES DAILY PRN
Status: DISCONTINUED | OUTPATIENT
Start: 2021-05-17 | End: 2021-05-18 | Stop reason: HOSPADM

## 2021-05-17 RX ORDER — SODIUM CHLORIDE 0.9 % (FLUSH) 0.9 %
5-40 SYRINGE (ML) INJECTION PRN
Status: DISCONTINUED | OUTPATIENT
Start: 2021-05-17 | End: 2021-05-18 | Stop reason: HOSPADM

## 2021-05-17 RX ORDER — MAGNESIUM SULFATE 1 G/100ML
1000 INJECTION INTRAVENOUS ONCE
Status: COMPLETED | OUTPATIENT
Start: 2021-05-17 | End: 2021-05-17

## 2021-05-17 RX ORDER — NICOTINE 21 MG/24HR
1 PATCH, TRANSDERMAL 24 HOURS TRANSDERMAL DAILY
Status: DISCONTINUED | OUTPATIENT
Start: 2021-05-18 | End: 2021-05-18 | Stop reason: HOSPADM

## 2021-05-17 RX ORDER — ACETAMINOPHEN 650 MG/1
650 SUPPOSITORY RECTAL EVERY 6 HOURS PRN
Status: DISCONTINUED | OUTPATIENT
Start: 2021-05-17 | End: 2021-05-18 | Stop reason: HOSPADM

## 2021-05-17 RX ORDER — SODIUM CHLORIDE 9 MG/ML
25 INJECTION, SOLUTION INTRAVENOUS PRN
Status: DISCONTINUED | OUTPATIENT
Start: 2021-05-17 | End: 2021-05-18 | Stop reason: HOSPADM

## 2021-05-17 RX ORDER — METOPROLOL SUCCINATE 25 MG/1
25 TABLET, EXTENDED RELEASE ORAL NIGHTLY
Status: DISCONTINUED | OUTPATIENT
Start: 2021-05-17 | End: 2021-05-18 | Stop reason: HOSPADM

## 2021-05-17 RX ORDER — ACETAMINOPHEN 325 MG/1
650 TABLET ORAL EVERY 6 HOURS PRN
Status: DISCONTINUED | OUTPATIENT
Start: 2021-05-17 | End: 2021-05-18 | Stop reason: HOSPADM

## 2021-05-17 RX ORDER — ATORVASTATIN CALCIUM 20 MG/1
20 TABLET, FILM COATED ORAL NIGHTLY
Status: DISCONTINUED | OUTPATIENT
Start: 2021-05-17 | End: 2021-05-18 | Stop reason: HOSPADM

## 2021-05-17 RX ORDER — ASPIRIN 81 MG/1
243 TABLET, CHEWABLE ORAL ONCE
Status: COMPLETED | OUTPATIENT
Start: 2021-05-17 | End: 2021-05-17

## 2021-05-17 RX ORDER — LOSARTAN POTASSIUM AND HYDROCHLOROTHIAZIDE 12.5; 5 MG/1; MG/1
2 TABLET ORAL NIGHTLY
Status: DISCONTINUED | OUTPATIENT
Start: 2021-05-17 | End: 2021-05-18 | Stop reason: HOSPADM

## 2021-05-17 RX ORDER — ATORVASTATIN CALCIUM 80 MG/1
80 TABLET, FILM COATED ORAL NIGHTLY
Status: DISCONTINUED | OUTPATIENT
Start: 2021-05-17 | End: 2021-05-17

## 2021-05-17 RX ORDER — CLOPIDOGREL BISULFATE 75 MG/1
75 TABLET ORAL DAILY
Status: DISCONTINUED | OUTPATIENT
Start: 2021-05-18 | End: 2021-05-18 | Stop reason: HOSPADM

## 2021-05-17 RX ORDER — SODIUM CHLORIDE 0.9 % (FLUSH) 0.9 %
5-40 SYRINGE (ML) INJECTION EVERY 12 HOURS SCHEDULED
Status: DISCONTINUED | OUTPATIENT
Start: 2021-05-17 | End: 2021-05-18 | Stop reason: HOSPADM

## 2021-05-17 RX ORDER — POLYETHYLENE GLYCOL 3350 17 G/17G
17 POWDER, FOR SOLUTION ORAL DAILY PRN
Status: DISCONTINUED | OUTPATIENT
Start: 2021-05-17 | End: 2021-05-18 | Stop reason: HOSPADM

## 2021-05-17 RX ORDER — ASPIRIN 81 MG/1
81 TABLET, CHEWABLE ORAL DAILY
Status: DISCONTINUED | OUTPATIENT
Start: 2021-05-18 | End: 2021-05-18 | Stop reason: HOSPADM

## 2021-05-17 RX ORDER — PROMETHAZINE HYDROCHLORIDE 25 MG/1
12.5 TABLET ORAL EVERY 6 HOURS PRN
Status: DISCONTINUED | OUTPATIENT
Start: 2021-05-17 | End: 2021-05-18 | Stop reason: HOSPADM

## 2021-05-17 RX ORDER — ONDANSETRON 2 MG/ML
4 INJECTION INTRAMUSCULAR; INTRAVENOUS EVERY 6 HOURS PRN
Status: DISCONTINUED | OUTPATIENT
Start: 2021-05-17 | End: 2021-05-18 | Stop reason: HOSPADM

## 2021-05-17 RX ADMIN — LOSARTAN POTASSIUM AND HYDROCHLOROTHIAZIDE 2 TABLET: 12.5; 5 TABLET ORAL at 23:53

## 2021-05-17 RX ADMIN — ATORVASTATIN CALCIUM 20 MG: 20 TABLET, FILM COATED ORAL at 23:53

## 2021-05-17 RX ADMIN — MAGNESIUM SULFATE HEPTAHYDRATE 1000 MG: 1 INJECTION, SOLUTION INTRAVENOUS at 16:47

## 2021-05-17 RX ADMIN — ASPIRIN 243 MG: 81 TABLET, CHEWABLE ORAL at 13:47

## 2021-05-17 RX ADMIN — SODIUM CHLORIDE, PRESERVATIVE FREE 10 ML: 5 INJECTION INTRAVENOUS at 23:53

## 2021-05-17 RX ADMIN — Medication 10 MILLICURIE: at 14:07

## 2021-05-17 RX ADMIN — METOPROLOL SUCCINATE 25 MG: 25 TABLET, EXTENDED RELEASE ORAL at 23:53

## 2021-05-17 ASSESSMENT — PAIN SCALES - GENERAL
PAINLEVEL_OUTOF10: 8
PAINLEVEL_OUTOF10: 0
PAINLEVEL_OUTOF10: 0

## 2021-05-17 ASSESSMENT — PAIN DESCRIPTION - DESCRIPTORS: DESCRIPTORS: PRESSURE

## 2021-05-17 NOTE — ED PROVIDER NOTES
EMERGENCY DEPARTMENT [facility] EMERGENCY DEPARTMENT        TRIAGE CHIEF COMPLAINT:   Chest Pain      [place]:  Harpreet Augustine is a 66 y.o. female that presents for left-sided chest pain, intermittent for the last week. Context patient has a history of coronary artery disease, heart catheterization every 2021 with Dr. Hernan Hall. Currently on a baby aspirin a day, no other anticoagulation use. Patient states for the last week, she has been having left-sided anterior chest pressure, 8/10 that does radiate down the left upper arm with short periods of exertion. Pain will resolve when resting and \"taking it easy. \"  Pain worsened this morning and he she began noticing swelling on the left side of her face without facial pain or dental pain. She did take a baby aspirin today and on time of my evaluation, she does state her pain is down to a 5/10. No hemoptysis, cough. She is a non-smoker with a history of COPD/asthma. Did not take any nitro today. Pain does not radiate to the back, no tearing or ripping sensation. No dizziness lightheadedness diaphoresis or near syncope. No recent URI cough or cold complaints.     ROS:  General:  No fevers, no chills   Cardiovascular:  See HPI  Respiratory:  See HPI   Gastrointestinal:  No pain, no nausea, no vomiting, no diarrhea  Musculoskeletal:  No muscle pain, no joint pain  Skin:  No rash, no pruritis, no easy bruising  Neurologic:  No speech problems, no headache, no extremity numbness, no extremity tingling, no extremity weakness  Psychiatric:  No anxiety  Genitourinary:  No dysuria, no hematuria  Extremities:  No edema    Past Medical History:   Diagnosis Date    Hyperlipidemia     Hypertension     \"on medication since 2015\"     Past Surgical History:   Procedure Laterality Date    ABDOMINOPLASTY  10+ yrs ago    \"tummy tuck\"    BREAST REDUCTION SURGERY  2012    belle    CHOLECYSTECTOMY, LAPAROSCOPIC  05/10/2017 IU/L    Alkaline Phosphatase 70 40 - 129 IU/L    GFR Non- 40 (L) >60 mL/min/1.73m2    GFR  49 (L) >60 mL/min/1.73m2    Anion Gap 7 4 - 16   Troponin   Result Value Ref Range    Troponin T <0.010 <0.01 NG/ML   Brain Natriuretic Peptide   Result Value Ref Range    Pro-.4 (H) <300 PG/ML   Magnesium   Result Value Ref Range    Magnesium 1.7 (L) 1.8 - 2.4 mg/dl   D-dimer, quantitative   Result Value Ref Range    D-Dimer, Quant <200 <230 NG/mL(DDU)   Protime/INR & PTT   Result Value Ref Range    Protime 11.5 (L) 11.7 - 14.5 SECONDS    INR 0.95 INDEX    aPTT 27.8 25.1 - 37.1 SECONDS   EKG 12 Lead   Result Value Ref Range    Ventricular Rate 63 BPM    Atrial Rate 63 BPM    P-R Interval 188 ms    QRS Duration 80 ms    Q-T Interval 392 ms    QTc Calculation (Bazett) 401 ms    P Axis 54 degrees    R Axis -43 degrees    T Axis 18 degrees    Diagnosis       Normal sinus rhythm  Left axis deviation  Low voltage QRS  Septal infarct (cited on or before 17-JUN-2019)  Possible Lateral infarct , age undetermined  Abnormal ECG  When compared with ECG of 17-JUN-2019 07:01,  QRS axis shifted left  Borderline criteria for Lateral infarct are now present  Questionable change in initial forces of Septal leads  ST no longer elevated in Inferior leads          Radiographs (if obtained):  [] The following radiograph was interpreted by myself in the absence of a radiologist:   [] Radiologist's Report Reviewed:  XR CHEST PORTABLE   Final Result   No evidence of acute cardiopulmonary disease. NM MYOCARDIAL SPECT REST EXERCISE OR RX    (Results Pending)        XR CHEST PORTABLE (Final result)  Result time 05/17/21 11:13:34  Final result by Abraham Lane MD (05/17/21 11:13:34)                Impression:    No evidence of acute cardiopulmonary disease.              Narrative:    EXAMINATION:   ONE XRAY VIEW OF THE CHEST     5/17/2021 11:05 am     COMPARISON:   June 16, 2019     HISTORY:   ORDERING SYSTEM PROVIDED HISTORY: Chest pain   TECHNOLOGIST PROVIDED HISTORY:   Reason for exam:->Chest pain   Reason for Exam: chest pain   facial swelling   Acuity: Unknown   Type of Exam: Unknown     FINDINGS:   Normal cardiac size.  No evidence of pneumonia, edema or other acute   pulmonary process. No evidence of acute process of the cardiac or mediastinal   structures. No evidence of pneumothorax or pleural effusion. EKG Interpretation  Please see ED physician's note - Dr. Myla Briseno - for EKG interpretation        Chart review shows recent radiographs:  XR CHEST PORTABLE    Result Date: 5/17/2021  EXAMINATION: ONE XRAY VIEW OF THE CHEST 5/17/2021 11:05 am COMPARISON: June 16, 2019 HISTORY: ORDERING SYSTEM PROVIDED HISTORY: Chest pain TECHNOLOGIST PROVIDED HISTORY: Reason for exam:->Chest pain Reason for Exam: chest pain   facial swelling Acuity: Unknown Type of Exam: Unknown FINDINGS: Normal cardiac size. No evidence of pneumonia, edema or other acute pulmonary process. No evidence of acute process of the cardiac or mediastinal structures. No evidence of pneumothorax or pleural effusion. No evidence of acute cardiopulmonary disease. ED COURSE & MEDICAL DECISION MAKING       Vital signs and nursing notes reviewed during ED course. I have independently evaluated this patient . Supervising physician - Dr. Myla Briseno - was present in ED and available for consultation throughout entirety of patient's care. All pertinent Lab data and radiographic results reviewed with patient at bedside. The patient and/or the family were informed of the results of any tests/labs/imaging, the treatment plan, and time was allotted to answer questions. Clinical Impression:  1. Chest pain, unspecified type        Patient presents with 1 week history of exertional left-sided chest pain. Work-up was initiated triage. On exam, well-appearing nontoxic 69-year-old female, no acute distress.   Vitals are stable, 100% on room air. No increased work of breathing. Unremarkable cardiopulmonary exam.  Lungs are clear to auscultation. Abdomen soft nontender. No pitting edema in the lower legs. Did place patient on telemetry continuous pulse ox monitoring. She did take a baby aspirin ED arrival and she was given 3 additional baby aspirin here. Labs are reassuring. Normal white count, hemoglobin is 12.0. CMP does show creatinine at 1.3 which is similar to previous, otherwise normal electrolytes, normal troponin and BNP for age. Chest x-ray shows no evidence of acute cardiopulmonary process. EKG also negative for evidence of acute ischemic changes. On chart review, patient did have a cardiac catheterization on 2/23/2021 which did show 80% stenosis of the LAD that was stented at that time. Circumflex has mild disease which was not intervened at that time. I did consult with Dr. Chey Rios - cardiology - and discussed patient's history, ED presentation/course including any pertinent laboratory findings and imaging study findings. He/she did evaluate patient while the emergency department, did add additional labs including D-dimer, would like patient to be admitted to hospital service for anticipated stress test and ACS/chest pain rule out    I then spoke with hospitalist, Sujey Buck NP hospitalist who agrees to hospital admission. Patient is admitted to the hospital in stable condition. In consideration of current COVID19 pandemic, with effort to minimize unnecessary provider exposure, this patient was seen at bedside by me independently. However, in compliance with current hospital CHANTE/ED protocol, prior to admission I did discuss this patient case with emergency department physician, Dr. Chelsie Andrade, who did agree with ED workup/evaluation and plan for admission. Of note, this Pt was NOT admitted to the ICU. Diagnosis and plan discussed in detail with patient who understands and agrees. Disposition referral (if applicable):  No follow-up provider specified.     Disposition medications (if applicable):  New Prescriptions    No medications on file         (Please note that portions of this note may have been completed with a voice recognition program. Efforts were made to edit the dictations but occasionally words are mis-transcribed.)         Omar Wolf PA-C  05/17/21 1912

## 2021-05-17 NOTE — H&P
regular rate and rhythm, normal S1 and S2,no murmur noted, peripheral pulses 2+, no pitting edema  ABDOMEN: Normal BS, Non tender, non distended, no HSM noted. MUSCULOSKELETAL:  ROM of all extremities grossly wnl  NEUROLOGIC: AOx 3,  Cranial nerves II-XII are grossly intact. Motor is 5 out of 5 bilaterally. Sensory is intact, no lateralizing findings. SKIN:  no bruising or bleeding, normal skin color, turgor, no redness, warmth,      Past Medical History:      Past Medical History:   Diagnosis Date    Hyperlipidemia     Hypertension     \"on medication since 2015\"     PSHX:  has a past surgical history that includes Knee Arthroplasty (Right, 2015); Abdominoplasty (10+ yrs ago); Breast reduction surgery (2012); Hysterectomy (age 44); Cholecystectomy, laparoscopic (05/10/2017); Coronary angioplasty with stent; and Thyroid surgery. Allergies: Allergies   Allergen Reactions    Dye [Iodides] Hives     \"Broke out in hives and got real red and hot. \"    Ibuprofen Other (See Comments)     \"Slows heart rate way down\"       FAM HX: family history includes Heart Disease in her brother; No Known Problems in her father; Stroke in her mother.     Soc HX:   Social History     Socioeconomic History    Marital status:      Spouse name: Not on file    Number of children: Not on file    Years of education: Not on file    Highest education level: Not on file   Occupational History    Not on file   Tobacco Use    Smoking status: Never Smoker    Smokeless tobacco: Never Used   Substance and Sexual Activity    Alcohol use: Yes     Comment: Socially    Drug use: No    Sexual activity: Not on file   Other Topics Concern    Not on file   Social History Narrative    Not on file     Social Determinants of Health     Financial Resource Strain:     Difficulty of Paying Living Expenses:    Food Insecurity:     Worried About Running Out of Food in the Last Year:     [address] in the Last Year: Transportation Needs:     Lack of Transportation (Medical):      Lack of Transportation (Non-Medical):    Physical Activity:     Days of Exercise per Week:     Minutes of Exercise per Session:    Stress:     Feeling of Stress :    Social Connections:     Frequency of Communication with Friends and Family:     Frequency of Social Gatherings with Friends and Family:     Attends [employer] Services:     Active Member of Clubs or Organizations:     Attends Club or Organization Meetings:     Marital Status:    Intimate Partner Violence:     Fear of Current or Ex-Partner:     Emotionally Abused:     Physically Abused:     Sexually Abused:        Electronically signed by МАРИНА Hall CNP on 5/17/2021 at 3:35 PM

## 2021-05-17 NOTE — CONSULTS
DICTATED [id number]  HX OF CAD AND PCI IN 2/21  FOR NOW WATCH  SUGGEST ADMIT FOR NOW    THANKS   STRESS TEST AND ECHO

## 2021-05-18 VITALS
HEIGHT: 67 IN | RESPIRATION RATE: 16 BRPM | BODY MASS INDEX: 24.01 KG/M2 | DIASTOLIC BLOOD PRESSURE: 72 MMHG | TEMPERATURE: 98.2 F | HEART RATE: 69 BPM | SYSTOLIC BLOOD PRESSURE: 128 MMHG | OXYGEN SATURATION: 98 % | WEIGHT: 153 LBS

## 2021-05-18 LAB
ANION GAP SERPL CALCULATED.3IONS-SCNC: 9 MMOL/L (ref 4–16)
BUN BLDV-MCNC: 25 MG/DL (ref 6–23)
CALCIUM SERPL-MCNC: 8.9 MG/DL (ref 8.3–10.6)
CHLORIDE BLD-SCNC: 105 MMOL/L (ref 99–110)
CHOLESTEROL: 133 MG/DL
CO2: 26 MMOL/L (ref 21–32)
CREAT SERPL-MCNC: 1.1 MG/DL (ref 0.6–1.1)
GFR AFRICAN AMERICAN: 59 ML/MIN/1.73M2
GFR NON-AFRICAN AMERICAN: 49 ML/MIN/1.73M2
GLUCOSE BLD-MCNC: 106 MG/DL (ref 70–99)
HCT VFR BLD CALC: 34.6 % (ref 37–47)
HDLC SERPL-MCNC: 41 MG/DL
HEMOGLOBIN: 11.3 GM/DL (ref 12.5–16)
LACTATE: 0.8 MMOL/L (ref 0.4–2)
LACTATE: 1.1 MMOL/L (ref 0.4–2)
LACTATE: 2.6 MMOL/L (ref 0.4–2)
LDL CHOLESTEROL DIRECT: 75 MG/DL
LV EF: 85 %
LVEF MODALITY: NORMAL
MAGNESIUM: 2 MG/DL (ref 1.8–2.4)
MCH RBC QN AUTO: 30.1 PG (ref 27–31)
MCHC RBC AUTO-ENTMCNC: 32.7 % (ref 32–36)
MCV RBC AUTO: 92 FL (ref 78–100)
PDW BLD-RTO: 13 % (ref 11.7–14.9)
PLATELET # BLD: 212 K/CU MM (ref 140–440)
PMV BLD AUTO: 9.9 FL (ref 7.5–11.1)
POTASSIUM SERPL-SCNC: 4 MMOL/L (ref 3.5–5.1)
RBC # BLD: 3.76 M/CU MM (ref 4.2–5.4)
SODIUM BLD-SCNC: 140 MMOL/L (ref 135–145)
TRIGL SERPL-MCNC: 156 MG/DL
TROPONIN T: <0.01 NG/ML
WBC # BLD: 3.7 K/CU MM (ref 4–10.5)

## 2021-05-18 PROCEDURE — 83721 ASSAY OF BLOOD LIPOPROTEIN: CPT

## 2021-05-18 PROCEDURE — G0378 HOSPITAL OBSERVATION PER HR: HCPCS

## 2021-05-18 PROCEDURE — 83605 ASSAY OF LACTIC ACID: CPT

## 2021-05-18 PROCEDURE — 2580000003 HC RX 258: Performed by: NURSE PRACTITIONER

## 2021-05-18 PROCEDURE — 3430000000 HC RX DIAGNOSTIC RADIOPHARMACEUTICAL: Performed by: INTERNAL MEDICINE

## 2021-05-18 PROCEDURE — 94761 N-INVAS EAR/PLS OXIMETRY MLT: CPT

## 2021-05-18 PROCEDURE — 6360000002 HC RX W HCPCS: Performed by: NURSE PRACTITIONER

## 2021-05-18 PROCEDURE — 36415 COLL VENOUS BLD VENIPUNCTURE: CPT

## 2021-05-18 PROCEDURE — 84484 ASSAY OF TROPONIN QUANT: CPT

## 2021-05-18 PROCEDURE — A9500 TC99M SESTAMIBI: HCPCS | Performed by: INTERNAL MEDICINE

## 2021-05-18 PROCEDURE — 85027 COMPLETE CBC AUTOMATED: CPT

## 2021-05-18 PROCEDURE — 6370000000 HC RX 637 (ALT 250 FOR IP): Performed by: NURSE PRACTITIONER

## 2021-05-18 PROCEDURE — 6360000002 HC RX W HCPCS: Performed by: INTERNAL MEDICINE

## 2021-05-18 PROCEDURE — 83735 ASSAY OF MAGNESIUM: CPT

## 2021-05-18 PROCEDURE — 80048 BASIC METABOLIC PNL TOTAL CA: CPT

## 2021-05-18 PROCEDURE — 93017 CV STRESS TEST TRACING ONLY: CPT

## 2021-05-18 PROCEDURE — 80061 LIPID PANEL: CPT

## 2021-05-18 PROCEDURE — 84132 ASSAY OF SERUM POTASSIUM: CPT

## 2021-05-18 PROCEDURE — 2580000003 HC RX 258: Performed by: INTERNAL MEDICINE

## 2021-05-18 RX ORDER — AMINOPHYLLINE DIHYDRATE 25 MG/ML
75 INJECTION, SOLUTION INTRAVENOUS ONCE
Status: COMPLETED | OUTPATIENT
Start: 2021-05-18 | End: 2021-05-18

## 2021-05-18 RX ORDER — SODIUM CHLORIDE 9 MG/ML
INJECTION, SOLUTION INTRAVENOUS CONTINUOUS
Status: DISCONTINUED | OUTPATIENT
Start: 2021-05-18 | End: 2021-05-18 | Stop reason: HOSPADM

## 2021-05-18 RX ORDER — CYCLOBENZAPRINE HCL 5 MG
5 TABLET ORAL 2 TIMES DAILY PRN
Qty: 10 TABLET | Refills: 0 | Status: SHIPPED | OUTPATIENT
Start: 2021-05-18 | End: 2021-05-28

## 2021-05-18 RX ORDER — 0.9 % SODIUM CHLORIDE 0.9 %
500 INTRAVENOUS SOLUTION INTRAVENOUS ONCE
Status: COMPLETED | OUTPATIENT
Start: 2021-05-18 | End: 2021-05-18

## 2021-05-18 RX ADMIN — ASPIRIN 81 MG: 81 TABLET, CHEWABLE ORAL at 12:43

## 2021-05-18 RX ADMIN — REGADENOSON 0.4 MG: 0.08 INJECTION, SOLUTION INTRAVENOUS at 08:43

## 2021-05-18 RX ADMIN — SODIUM CHLORIDE, PRESERVATIVE FREE 10 ML: 5 INJECTION INTRAVENOUS at 12:44

## 2021-05-18 RX ADMIN — Medication 30 MILLICURIE: at 09:55

## 2021-05-18 RX ADMIN — SODIUM CHLORIDE 500 ML: 9 INJECTION, SOLUTION INTRAVENOUS at 12:39

## 2021-05-18 RX ADMIN — ENOXAPARIN SODIUM 40 MG: 40 INJECTION SUBCUTANEOUS at 12:43

## 2021-05-18 RX ADMIN — CLOPIDOGREL BISULFATE 75 MG: 75 TABLET ORAL at 12:43

## 2021-05-18 RX ADMIN — AMINOPHYLLINE 75 MG: 25 INJECTION, SOLUTION INTRAVENOUS at 08:53

## 2021-05-18 ASSESSMENT — PAIN SCALES - GENERAL
PAINLEVEL_OUTOF10: 0

## 2021-05-18 NOTE — PROGRESS NOTES
Daily Progress Note  Feeling better  No chest pain   Will check on stress test  Elevated lactic acid       Seen after stress test today. Feeling well, denies any CP or SOB. No CP during stress. BP and HR stable  EF shows normal EF  Cardiac diet at this time. If stress test comes back negative. Able to d/c today. Chest Pain    Symptoms have resolved today    Stress test today    Echo reassuring    EKG reviewed; no ST segment changes. Troponin negative. Okay to d/c if stress test comes back negative. PAST MEDICAL HISTORY:  Significant for coronary artery disease, status  post angioplasty done in 02/2021. She had a heart catheterization prior  to that back in 03/2019. History of having hypertension, hyperlipidemia  present.     PAST SURGICAL HISTORY:  Angioplasty of the LAD and circumflex artery.     SOCIAL HISTORY:  She does not smoke, does not drink.     ALLERGIES:  To IODINE and IBUPROFEN.     MEDICATIONS:  She is on aspirin, Lipitor, Hyzaar 50/12.5, Toprol, and  Plavix and _____.   Objective:   /69   Pulse 63   Temp 98.3 °F (36.8 °C) (Oral)   Resp 17   Ht 5' 7\" (1.702 m)   Wt 153 lb (69.4 kg)   SpO2 98%   BMI 23.96 kg/m²   No intake or output data in the 24 hours ending 05/18/21 1047    Medications:   Scheduled Meds:   atorvastatin  20 mg Oral Nightly    clopidogrel  75 mg Oral Daily    losartan-hydroCHLOROthiazide  2 tablet Oral Nightly    metoprolol succinate  25 mg Oral Nightly    sodium chloride flush  5-40 mL Intravenous 2 times per day    aspirin  81 mg Oral Daily    nicotine  1 patch Transdermal Daily    enoxaparin  40 mg Subcutaneous Daily      Infusions:   sodium chloride        PRN Meds:  nitroGLYCERIN, sodium chloride flush, sodium chloride, promethazine **OR** ondansetron, acetaminophen **OR** acetaminophen, polyethylene glycol       Physical Exam:  Vitals:    05/18/21 0545   BP: 112/69   Pulse: 63   Resp: 17   Temp: 98.3 °F (36.8 °C)   SpO2: 98% General: AAO, NAD  Chest: Nontender  Cardiac: First and Second Heart Sounds are Normal, No Murmurs or Gallops noted  Lungs:Clear to auscultation and percussion. Abdomen: Soft, NT, ND, +BS  Extremities: No clubbing, no edema  Vascular:  Equal 2+ peripheral pulses. Lab Data:  CBC:   Recent Labs     05/17/21  1057 05/18/21  0427   WBC 4.0 3.7*   HGB 12.0* 11.3*   HCT 36.9* 34.6*   MCV 92.7 92.0    212     BMP:   Recent Labs     05/17/21  1057 05/18/21  0427    140   K 4.0 4.0    105   CO2 28 26   BUN 20 25*   CREATININE 1.3* 1.1     LIVER PROFILE:   Recent Labs     05/17/21  1057   AST 15   ALT 9*   BILITOT 0.6   ALKPHOS 70     PT/INR:   Recent Labs     05/17/21  1058   PROTIME 11.5*   INR 0.95     APTT:   Recent Labs     05/17/21  1058   APTT 27.8     BNP:  No results for input(s): BNP in the last 72 hours.       Assessment:  Patient Active Problem List    Diagnosis Date Noted    Other chest pain 05/17/2021    S/P angioplasty 02/23/2021    Chest pain 06/16/2019    Unstable angina ([facility] [address] 03/15/2019    Hypertelorism 03/15/2019    Sigmoid diverticulitis     Calculus of gallbladder with acute on chronic cholecystitis without obstruction 05/10/2017    RUQ abdominal pain        Electronically signed by МАРИНА Darling CNP on 5/18/2021 at 10:47 AM  Electronically signed by Gunnar Caldwell MD on 5/18/21 at 12:02 PM EDT

## 2021-05-18 NOTE — CONSULTS
[address] [place] [address], [address] [facility]                                  CONSULTATION    PATIENT NAME: Dell Litten                     :        1950  MED REC NO:   [id number]                          ROOM:       ED21  ACCOUNT NO:   [de-identified]                           ADMIT DATE: 2021  PROVIDER:     Ladi Falcon MD    CONSULT DATE:  2021    INDICATION:  Chest pain. HISTORY OF PRESENT ILLNESS:  This is a 69-year-old female patient who  comes to the hospital having chest pain. She has been having chest pain  on and off, and on the left side, her chest pain is present. She denies  any fevers or chills. No cough production. No other  or GI  complaints present. She had some discomfort this morning, but right  now, pain is resolved. The patient is known to have coronary artery disease. Had a heart cath  in 2021. Left main was patent. LAD mid to distal 80% stenosis. It  was predilated and stented with a drug-eluting stent Xience 2.25 x 33 mm  stent. GuideLiner was used. Circ was mild disease. OM stent was  patent. RCA was patent. PAST MEDICAL HISTORY:  Significant for coronary artery disease, status  post angioplasty done in 2021. She had a heart catheterization prior  to that back in 2019. History of having hypertension, hyperlipidemia  present. PAST SURGICAL HISTORY:  Angioplasty of the LAD and circumflex artery. SOCIAL HISTORY:  She does not smoke, does not drink. ALLERGIES:  To IODINE and IBUPROFEN. MEDICATIONS:  She is on aspirin, Lipitor, Hyzaar 50/12.5, Toprol, and  Plavix and _____. PHYSICAL EXAMINATION:  GENERAL:  The patient is awake, alert, answering questions, not in acute  distress. VITAL SIGNS:  Temperature is afebrile. Pulse is 60. Blood pressure  129/79. HEENT:  Head is normocephalic and atraumatic. Pupils are equal and  reactive.   CHEST:  Equal expansion. LUNGS:  Clear to auscultation. No wheezing or rhonchi. HEART:  Regular rhythm. ABDOMEN:  Soft, nontender. Bowel sounds are present. No  hepatosplenomegaly or guarding appreciated. EXTREMITIES:  No cyanosis or clubbing noted. NEUROLOGIC:  Cranial nerves II through XII are grossly intact. LABORATORY DATA:  BUN is 20, creatinine 1.3. Troponins are negative. LFTs are normal.  An EKG shows a sinus rhythm present. IMPRESSION:  This is a 66-year-old female patient who comes in with  chest pain. She is known to have a coronary artery disease present. She had a heart catheterization done back in 02/2021. Her EKG reveals  no acute ST changes present. I would recommend her ruling out for ACS. We will make further recommendation based on hospital course.         Erasmo Shrestha MD    D: 05/17/2021 13:54:22       T: 05/17/2021 13:58:54     [id number]  Job#: [id number]     Doc#: [id number]    CC:

## 2021-05-18 NOTE — DISCHARGE SUMMARY
Discharge Summary    Name:  Geraldo Granados /Age/Sex: 1950  (84 y.o. female)   MRN & CSN:  [id number] & [id number] Admission Date/Time: 2021 12:40 PM   Attending:  Radha Morales MD Discharging Physician: Radha Morales MD     Hospital Course:   Geraldo Granados is a 66 y.o.  female  who presents with Other chest pain    She has past medical history significant for hypertension, hyperlipidemia and coronary artery disease who presented to the hospital because of left-sided chest pain. Echocardiogram showed ejection fraction of 50 to 55%. Stress test came back normal.    Lactic acidosis was checked and was noted to be elevated. There was no evidence of infection. Repeat lactic acid was normal.    She was discharged stable. The patient expressed appropriate understanding of and agreement with the discharge recommendations, medications, and plan.      Consults this admission:  IP CONSULT TO CARDIOLOGY  IP CONSULT TO HOSPITALIST  IP CONSULT TO CARDIOLOGY  IP CONSULT TO SOCIAL WORK    Discharge Instruction:   Follow up appointments: Cardiology  Primary care physician:  within 2 weeks    Diet:  cardiac diet   Activity: activity as tolerated  Disposition: Discharged to:   [x]Home, []C, []SNF, []Acute Rehab, []Hospice   Condition on discharge: Stable    Discharge Medications:      Hamida Yañez [facility] Medication Instructions PPR:[id number]    Printed on:21 2959   Medication Information                      aspirin 81 MG chewable tablet  Take 1 tablet by mouth daily             atorvastatin (LIPITOR) 20 MG tablet  Take 1 tablet by mouth nightly             clopidogrel (PLAVIX) 75 MG tablet  Take 1 tablet by mouth daily             cyclobenzaprine (FLEXERIL) 5 MG tablet  Take 1 tablet by mouth 2 times daily as needed for Muscle spasms             Fexofenadine HCl (ALLEGRA PO)  Take by mouth             losartan-hydrochlorothiazide (HYZAAR) 50-12.5 MG per tablet  Take 2 tablets by mouth nightly             meloxicam (MOBIC) 15 MG tablet  Take 15 mg by mouth daily as needed for Pain             metoprolol succinate (TOPROL XL) 25 MG extended release tablet  Take 1 tablet by mouth nightly             nitroGLYCERIN (NITROSTAT) 0.4 MG SL tablet  up to max of 3 total doses. If no relief after 1 dose, call 911. Objective Findings at Discharge:   /72   Pulse 69   Temp 98.2 °F (36.8 °C) (Oral)   Resp 16   Ht 5' 7\" (1.702 m)   Wt 153 lb (69.4 kg)   SpO2 98%   BMI 23.96 kg/m²            PHYSICAL EXAM   GEN Awake female, sitting upright in bed in no apparent distress. Appears given age. EYES Pupils are equally round. No scleral erythema, discharge, or conjunctivitis. HENT Mucous membranes are moist. Oral pharynx without exudates, no evidence of thrush. NECK Supple, no apparent thyromegaly or masses. RESP Clear to auscultation, no wheezes, rales or rhonchi. Symmetric chest movement while on room air. CARDIO/VASC S1/S2 auscultated. Regular rate without appreciable murmurs, rubs, or gallops. No JVD or carotid bruits. Peripheral pulses equal bilaterally and palpable. No peripheral edema. GI Abdomen is soft without significant tenderness, masses, or guarding. Bowel sounds are normoactive. Rectal exam deferred. MSK No gross joint deformities. SKIN Normal coloration, warm, dry. NEURO Cranial nerves appear grossly intact, normal speech, no lateralizing weakness. PSYCH Awake, alert, oriented x 4. Affect appropriate.     BMP/CBC  Recent Labs     05/17/21  1057 05/18/21  0427    140   K 4.0 4.0    105   CO2 28 26   BUN 20 25*   CREATININE 1.3* 1.1   WBC 4.0 3.7*   HCT 36.9* 34.6*    212       IMAGING:  Echocardiogram complete 2D with doppler with color    Result Date: 5/17/2021  Transthoracic Echocardiography Report (TTE)  Demographics   Patient Name       Octavio Gaitan    Date of Study       05/17/2021   Date of Birth      1950        Gender Female   Age                66 year(s)        Race                   Patient Number     [id number]        Room Number         ED21   Visit Number       [id number]   Corporate ID       [id number]   Accession Number   [id number]        Sonographer         Era Escobedo RVT, RDMS   Ordering Physician Haily Hilton MD  Interpreting        Haily Hilton MD                                       Physician  Procedure Type of Study   TTE procedure:ECHOCARDIOGRAM COMPLETE 2D W DOPPLER W COLOR. Procedure Date Date: 05/17/2021 Start: 02:32 PM Study Location: Portable Technical Quality: Adequate visualization Indications:Chest pain. Patient Status: Routine Height: 67 inches Weight: 150 pounds BSA: 1.79 m2 BMI: 23.49 kg/m2 HR: 61 bpm BP: 149/67 mmHg  Conclusions   Summary  Left ventricular systolic function is normal.  Ejection fraction is visually estimated at 50-55%. Sclerotic, but non-stenotic aortic valve. No evidence of any pericardial effusion. Signature   ------------------------------------------------------------------  Electronically signed by Haily Hilton MD (Interpreting  physician) on 05/17/2021 at 03:03 PM  ------------------------------------------------------------------   Findings   Left Ventricle  Left ventricular systolic function is normal.  Ejection fraction is visually estimated at 50-55%. Left Atrium  Essentially normal left atrium. Right Atrium  Essentially normal right atrium. Right Ventricle  Essentially normal right ventricle. Aortic Valve  Sclerotic, but non-stenotic aortic valve. Mitral Valve  Mild mitral regurgitation. Tricuspid Valve  Mild tricuspid regurgitation; RVSP: 22 mmHg. Pulmonic Valve  Trace pulmonic regurgitation present. Pericardial Effusion  No evidence of any pericardial effusion. Pleural Effusion  No evidence of pleural effusion.   M-Mode/2D Measurements & Calculations   LV Diastolic Dimension:  LV Systolic Dimension:  LA Dimension: 2.9 cmAO Root  4.03 cm                  2.52 cm                 Dimension: 3 cmLA Area:  LV FS:37.5 %             LV Volume Diastolic: 63 79.5 cm2  LV PW Diastolic: 1.03 cm ml  LV PW Systolic: 8.96 cm  LV Volume Systolic: 22  Septum Diastolic: 8.07   ml  cm                       LV EDV/LV EDV Index: 63 RV Diastolic Dimension:  Septum Systolic: 1.33 cm AF/33 V3VW ESV/LV ESV   3.09 cm  CO: 3.43 l/min           Index: 22 ml/12 m2  CI: 1.92 l/m*m2          EF Calculated (A4C):    LA/Aorta: 0.97                           65.1 %  LV Area Diastolic: 24    EF Calculated (2D): 68  LA volume/Index: 34 ml  cm2                      %                       /44R0  LV Area Systolic: 83.4  cm2                      LV Length: 7.6 cm                            LVOT: 1.8 cm  Doppler Measurements & Calculations   MV Peak E-Wave: 87.9  AV Peak Velocity: 111 cm/s  LVOT Peak Velocity: 89.5  cm/s                  AV Peak Gradient: 4.93 mmHg cm/s  MV Peak A-Wave: 87.4  AV Mean Velocity: 83.6 cm/s LVOT Mean Velocity: 69.6  cm/s                  AV Mean Gradient: 3 mmHg    cm/s  MV E/A Ratio: 1.01    AV VTI: 26.8 cm             LVOT Peak Gradient: 3  MV Peak Gradient:     AV Area (Continuity):2.1    mmHgLVOT Mean Gradient: 2  3.09 mmHg             cm2                         mmHg                                                    Estimated RVSP: 22 mmHg  MV P1/2t: 70 msec     LVOT VTI: 22.1 cm           Estimated RAP:3 mmHg  MVA by PHT:3.14 cm2                        Estimated PASP: 22.36 mmHg  MV E' Septal                                      TR Velocity:220 cm/s  Velocity: 8.66 cm/s                               TR Gradient:19.36 mmHg  MV E' Lateral  Velocity: 9.54 cm/s  MV E/E' septal: 10.15  MV E/E' lateral: 9.21      XR CHEST PORTABLE    Result Date: 5/17/2021  EXAMINATION: ONE XRAY VIEW OF THE CHEST 5/17/2021 11:05 am COMPARISON: June 16, 2019 HISTORY: ORDERING SYSTEM PROVIDED HISTORY: Chest pain TECHNOLOGIST PROVIDED HISTORY: Reason for exam:->Chest pain Reason for Exam: chest pain   facial swelling Acuity: Unknown Type of Exam: Unknown FINDINGS: Normal cardiac size. No evidence of pneumonia, edema or other acute pulmonary process. No evidence of acute process of the cardiac or mediastinal structures. No evidence of pneumothorax or pleural effusion. No evidence of acute cardiopulmonary disease. NM MYOCARDIAL SPECT REST EXERCISE OR RX    Result Date: 5/18/2021  Cardiac Perfusion Imaging   Demographics   Patient Name       Magdalena TRAMMELL    Date of study         05/18/2021   Date of Birth      1950        Gender                Female   Age                66 year(s)        Race                     Patient Number     [id number]        Room Number           3104   Visit Number       [id number]         Height                67 inches   Corporate ID       [id number]          Weight                150 pounds   Accession Number   [id number]                                        NM Technologist       Rand Wyatt [facility]   Ordering Physician Cait Ascencio MD  Interpreting          Cait Ascencio MD                                       Cardiologist   Conclusions   Summary  No EKG changes suggestive of ischemia with Lexiscan infusion. Normal perfusion uptake noted  no reversible ischemia noted  gating shows EF 85%   Signatures   ------------------------------------------------------------------  Electronically signed by Cait Ascencio MD (Interpreting  cardiologist) on 05/18/2021 at 12:54  ------------------------------------------------------------------  Procedure Procedure Type:   Nuclear Stress Test:Pharmacological, Myocardial Perfusion Imaging with  Pharm, NM MYOCARDIAL SPECT REST EXERCISE OR RX  Indications: CAD and Chest pain. Risk Factors   The patient risk factors include:prior PCI;hypercholesterolemia and  hypertension. Stress Protocols   Resting ECG  Normal sinus rhythm. Resting HR:70 bpm  Resting BP:123/71 mmHg  Stress Protocol:Pharmacologic - Lexiscan  Peak HR:83 bpm                       HR/BP product:04059  Peak BP:123/71 mmHg  Predicted HR: 148 bpm  % of predicted HR: 56   Exercise duration: 01:01 min  Reason for termination:Arrhythmias   Symptoms  Nausea. The patient was given an intravenous injection of  Aminophylline to relieve symptoms from Spero Leighton. Stress Interpretation  No EKG changes suggestive of ischemia with Lexiscan infusion. Procedure Medications   - Lexiscan I.V. bolus (over 15sec.) 0.4 mg admininstered @ 05/18/2021 08:50.   - Aminophylline I.V. 75 mg admininstered @ 05/18/2021 08:53. Imaging Protocols   Rest                             Stress   Isotope:Sestamibi 99mTc          Isotope: Sestamibi 99mTc  Isotope dose:10.6 mCi            Isotope dose:32.5 mCi  Administration route: I.V. Administration route: I.V. Injection Date:05/17/2021 14:07  Injection Date:05/18/2021 08:50  Scan Date:05/17/2021 15:07       Scan Date:05/18/2021 09:50   Technique:        SPECT          Technique:        Gated                                                     SPECT   Procedure Description   Upon patient arrival, the patient is identified using two identifiers and  the physician order is verified. An IV is established and 8-11mCi of 99mTc  Sestamibi is intravenously injected and followed with 10mL 0.9% Normal  Saline flush. A circulation period of 45 minutes occurs prior to resting  SPECT imaging. After imaging is complete the patient is escorted to the  stress lab. The patient is connected to the ECG and blood pressure is  measured. The RN starts the stress portion of the exam and rapidly  intravenously injects Lexiscan (regadenosine) 0.4mg over a period of 10 to15  seconds and follows with 5mL 0.9% Normal Saline flush. Immediately following  the Nuclear Technologist intravenously injects 22-33mCi of 99mTc Sestamibi  and 5mL 0.9% Normal Saline flush.  After completion, recovery, and removal of  the IV, the patient rests during the second circulation period of 45  minutes. Final stress SPECT gated imaging is performed. The patient may  return home or to their room after stress imaging. The images are processed  and final charting is completed and sent to the appropriate cardiologist for  interpretation and reporting. Perfusion Interpretation   Normal perfusion uptake noted  no reversible ischemia noted  gating shows EF 85%  Imaging Results    Summed scores     - Summed stress score: 4     - Summed rest score: 2     - Summed difference score:    2   Rest ejection  Ejection fraction:85 %  EDV :39 ml  ESV :6 ml  Stroke volume :33 ml  Medical History   Accession#:  [id number]  Admission Data Admission date: 05/17/2021 Admission Time: 12:40 Hospital Status: Inpatient.       Electronically signed by Radha Morales MD on 5/18/2021 at 1:46 PM

## 2021-05-18 NOTE — PLAN OF CARE
Problem: Falls - Risk of:  Goal: Will remain free from falls  Description: Will remain free from falls  Outcome: Met This Shift  Goal: Absence of physical injury  Description: Absence of physical injury  Outcome: Met This Shift     Problem: Pain:  Goal: Pain level will decrease  Description: Pain level will decrease  Outcome: Met This Shift  Goal: Control of acute pain  Description: Control of acute pain  Outcome: Met This Shift  Goal: Control of chronic pain  Description: Control of chronic pain  Outcome: Met This Shift     Problem: Discharge Planning:  Goal: Discharged to appropriate level of care  Description: Discharged to appropriate level of care  Outcome: Met This Shift    Patient has shown great improvement since being picked up by EMS and brought in for chest pain that has been persistent over the past week at home. Relieved by the time she arrived to the unit for overnight observation with planned stress test on 5/18. No further presentation of pain, able to rest comfortably.  Discharge date will likely be dependent on results of stress test.

## 2021-05-19 LAB
EKG ATRIAL RATE: 63 BPM
EKG DIAGNOSIS: NORMAL
EKG P AXIS: 54 DEGREES
EKG P-R INTERVAL: 188 MS
EKG Q-T INTERVAL: 392 MS
EKG QRS DURATION: 80 MS
EKG QTC CALCULATION (BAZETT): 401 MS
EKG R AXIS: -43 DEGREES
EKG T AXIS: 18 DEGREES
EKG VENTRICULAR RATE: 63 BPM

## 2021-05-19 PROCEDURE — 93010 ELECTROCARDIOGRAM REPORT: CPT | Performed by: INTERNAL MEDICINE

## 2021-09-20 NOTE — PROGRESS NOTES
Pt discharged to home per order. Transported to car per wheelchair. Orthopaedic Post Op Note    Procedure: Left Anterior THR  Surgeon: Jade Espinoza    61y Female comfortable, without complaints. Feeling great. OOB and ambulated with PT.  Reported pain score = 0 Seen by PA Student  Denies N/V, CP, SOB, numbness/tingling of extremities.    PE:  Vital Signs Last 24 Hrs  T(C): 36.4 (20 Sep 2021 12:58), Max: 36.8 (20 Sep 2021 07:16)  T(F): 97.5 (20 Sep 2021 12:58), Max: 98.3 (20 Sep 2021 07:16)  HR: 72 (20 Sep 2021 12:58) (65 - 81)  BP: 126/66 (20 Sep 2021 12:58) (100/52 - 132/91)  BP(mean): 80 (20 Sep 2021 12:00) (64 - 94)  RR: 16 (20 Sep 2021 12:58) (10 - 17)  SpO2: 100% (20 Sep 2021 12:58) (94% - 100%)  General: Pt alert and oriented   Lungs: + BS CTA bilaterally  Heart: +S1 & S2 heard, RRR  Abd: + BS heard, soft, NT, ND  Left Hip [place]lon Dressing: C/D/I   Bilateral LEs:  Motor:   5/5 dorsiflexion, plantarflexion, EHL  Sensation intact to LT  2+ DP Pulses  SCDs in place    A/P: 61y Female stable POD#0 s/p Left Anterior THR   -  Acetaminophen, Celebrex, Tramadol, Dilaudid for Pain Control   - DVT ppx: Eliquis 2.5mg q 12h  - Delicia op IV abx: Vanco  - Celebrex for HO ppx  - Anterior total hip precautions  - PT, OT per protocol  - F/U AM Labs  DCP = home possibly today if PT, OT, medically cleared

## 2021-12-17 ENCOUNTER — HOSPITAL ENCOUNTER (OUTPATIENT)
Dept: GENERAL RADIOLOGY | Age: 71
Discharge: HOME | End: 2021-12-17
Payer: MEDICARE

## 2021-12-17 ENCOUNTER — HOSPITAL ENCOUNTER (OUTPATIENT)
Dept: SPEECH THERAPY | Age: 71
Setting detail: THERAPIES SERIES
Discharge: HOME | End: 2021-12-17
Payer: MEDICARE

## 2021-12-17 DIAGNOSIS — R13.14 DYSPHAGIA, PHARYNGOESOPHAGEAL: ICD-10-CM

## 2021-12-17 PROCEDURE — 74230 X-RAY XM SWLNG FUNCJ C+: CPT

## 2021-12-17 PROCEDURE — 92611 MOTION FLUOROSCOPY/SWALLOW: CPT

## 2021-12-17 NOTE — PROCEDURES
INSTRUMENTAL SWALLOW REPORT  MODIFIED BARIUM SWALLOW    NAME: Deangelo Juarez   : 1950  MRN: [id number]       Date of Eval: 2021     Ordering Physician: Dr. Mariah Diego  Radiologist: available upon request  ENT: Dr. Mariah Diego  Referring Diagnosis(es): Referring Diagnosis: R13.14    Past Medical History:  has a past medical history of Hyperlipidemia and Hypertension. Past Surgical History:  has a past surgical history that includes Knee Arthroplasty (Right, 2015); Abdominoplasty (10+ yrs ago); Breast reduction surgery (2012); Hysterectomy (age 44); Cholecystectomy, laparoscopic (05/10/2017); Coronary angioplasty with stent; and Thyroid surgery. Current Diet Solid Consistency: Regular  Current Diet Liquid Consistency: Thin       Type of Study: Initial MBS       Recent CXR/CT of Chest: see chart    Patient Complaints/Reason for Referral:  Deangelo Juarez was referred for a MBS to assess the efficiency of his/her swallow function, assess for aspiration, and to make recommendations regarding safe dietary consistencies, effective compensatory strategies, and safe eating environment. Patient complaints: difficulty swallowing foods and pills    Onset of problem:   Date of Onset: several months ago, per pt report            Behavior/Cognition/Vision/Hearing:  Behavior/Cognition: Alert; Cooperative  Hearing: Within functional limits    Impressions: Deangelo Juarez was seen for an outpatient modified barium swallow study. She reports dysphagia with solid textures and occasionally pills. Symptoms characterized by sensation that \"something is blocking it\" when she attempts to swallow solid consistencies. Symptoms have been ongoing for several months and occur multiple times each day. She endorses history of acid reflux. She denies relevant medical hx including CVA or other neuro diagnosis, head/neck surgery, or PNA. Per chart review, medical hx includes CAD, HTN, HLD.     Pt seen for study seated upright in chair, filmed in lateral view. She was presented with PO trials of thin liquids via tsp/cup/straw, puree, and regular solids. Oral stage WFL with intact labial seal, mastication/bolus formation, AP transit, and oral clearance. Pharyngeal stage WFL with intact swallow initiation/laryngeal elevation/anterior hyoid excursion/tongue base retraction/epiglottic inversion/laryngeal vestibule closure/palatal elevation/posterior pharyngeal stripping wave/UES distension. Trace-no residue remains within the valleculae and pyriform sinuses. No laryngeal penetration or aspiration identified with any presented consistency. Noted abnormality of the cervical spine C5-6 suspicious for possible osteophytes - question if this may be contributing to pt's symptoms. Esophagus was screened and appears unremarkable. Recommend continued regular diet/thin liquids, general reflux precautions. No further outpatient SLP services indicated. Results/recommendations d/w pt. Treatment Dx and ICD 10: Pharyngoesophageal dysphagia R13.14   Patient Position: Lateral and Patient Degrees: 90      Consistencies Administered: Reg solid; Dysphagia Pureed (Dysphagia I); Thin teaspoon; Thin cup; Thin straw; Barium Pill      Upper Esophageal Screen: WFL    Dysphagia Outcome Severity Scale: Level 6: Within functional limits/Modified independence  Penetration-Aspiration Scale (PAS): 1 - Material does not enter the airway    Recommended Diet:  Solid consistency: Regular  Liquid consistency: Thin  Liquid administration via: Cup; Straw    Medication administration: PO    Safe Swallow Protocol:  Supervision: Independent  Compensatory Swallowing Strategies: Upright as possible for all oral intake; Eat/Feed slowly; Small bites/sips              Recommendations/Treatment  Requires SLP Intervention: No        D/C Recommendations:  To be determined         Recommended Exercises:   N/A         Education: Images and recommendations were reviewed with Irving Jones following this exam.   Patient Education: results, recommendations  Patient Education Response: Verbalizes understanding    Prognosis  Barriers/Prognosis Comment: N/A  Duration/Frequency of Treatment  Duration/Frequency of Treatment: N/A  Safety Devices  Safety Devices in place: Not Applicable      Goals:    Long Term:      Goal 1: N/A        Short Term:     Goal 1: N/A                          Oral Preparation / Oral Phase  Oral Phase: WFL        Pharyngeal Phase  Pharyngeal Phase: WFL      Esophageal Phase  WFL        Pain   0         Therapy Time:   Individual Concurrent Group Co-treatment   Time In 0930         Time Out 1000         Minutes Josesito Madrigal, [address] [facility], 12/17/2021, 3:42 PM

## 2022-01-10 ENCOUNTER — HOSPITAL ENCOUNTER (OUTPATIENT)
Age: 72
LOS: 1 days | Discharge: HOME | DRG: 251 | End: 2022-01-10
Attending: EMERGENCY MEDICINE | Admitting: EMERGENCY MEDICINE
Payer: MEDICARE

## 2022-01-10 ENCOUNTER — APPOINTMENT (OUTPATIENT)
Dept: GENERAL RADIOLOGY | Age: 72
DRG: 251 | End: 2022-01-10
Payer: MEDICARE

## 2022-01-10 VITALS
SYSTOLIC BLOOD PRESSURE: 116 MMHG | OXYGEN SATURATION: 98 % | DIASTOLIC BLOOD PRESSURE: 51 MMHG | BODY MASS INDEX: 21.97 KG/M2 | WEIGHT: 140 LBS | RESPIRATION RATE: 16 BRPM | HEART RATE: 80 BPM | HEIGHT: 67 IN | TEMPERATURE: 97.5 F

## 2022-01-10 DIAGNOSIS — Z95.5 HISTORY OF CORONARY ANGIOPLASTY WITH INSERTION OF STENT: ICD-10-CM

## 2022-01-10 DIAGNOSIS — Z98.62 S/P ANGIOPLASTY: ICD-10-CM

## 2022-01-10 DIAGNOSIS — R07.9 CHEST PAIN, UNSPECIFIED TYPE: Primary | ICD-10-CM

## 2022-01-10 PROBLEM — I20.0 UNSTABLE ANGINA (HCC): Status: RESOLVED | Noted: 2019-03-15 | Resolved: 2022-01-10

## 2022-01-10 LAB
ACTIVATED CLOTTING TIME, LOW RANGE: 369 SEC
ALBUMIN SERPL-MCNC: 4.4 GM/DL (ref 3.4–5)
ALP BLD-CCNC: 81 IU/L (ref 40–128)
ALT SERPL-CCNC: 11 U/L (ref 10–40)
ANION GAP SERPL CALCULATED.3IONS-SCNC: 10 MMOL/L (ref 4–16)
AST SERPL-CCNC: 17 IU/L (ref 15–37)
BASOPHILS ABSOLUTE: 0.1 K/CU MM
BASOPHILS RELATIVE PERCENT: 1.3 % (ref 0–1)
BILIRUB SERPL-MCNC: 0.4 MG/DL (ref 0–1)
BUN BLDV-MCNC: 22 MG/DL (ref 6–23)
CALCIUM SERPL-MCNC: 9.6 MG/DL (ref 8.3–10.6)
CHLORIDE BLD-SCNC: 102 MMOL/L (ref 99–110)
CO2: 28 MMOL/L (ref 21–32)
CREAT SERPL-MCNC: 1.3 MG/DL (ref 0.6–1.1)
DIFFERENTIAL TYPE: ABNORMAL
EOSINOPHILS ABSOLUTE: 0.2 K/CU MM
EOSINOPHILS RELATIVE PERCENT: 6 % (ref 0–3)
GFR AFRICAN AMERICAN: 49 ML/MIN/1.73M2
GFR NON-AFRICAN AMERICAN: 40 ML/MIN/1.73M2
GLUCOSE BLD-MCNC: 157 MG/DL (ref 70–99)
HCT VFR BLD CALC: 39.7 % (ref 37–47)
HEMOGLOBIN: 12.9 GM/DL (ref 12.5–16)
IMMATURE NEUTROPHIL %: 0.3 % (ref 0–0.43)
LYMPHOCYTES ABSOLUTE: 1.3 K/CU MM
LYMPHOCYTES RELATIVE PERCENT: 32.4 % (ref 24–44)
MCH RBC QN AUTO: 29.9 PG (ref 27–31)
MCHC RBC AUTO-ENTMCNC: 32.5 % (ref 32–36)
MCV RBC AUTO: 92.1 FL (ref 78–100)
MONOCYTES ABSOLUTE: 0.3 K/CU MM
MONOCYTES RELATIVE PERCENT: 6.5 % (ref 0–4)
NUCLEATED RBC %: 0 %
PDW BLD-RTO: 12.3 % (ref 11.7–14.9)
PLATELET # BLD: 244 K/CU MM (ref 140–440)
PMV BLD AUTO: 10.2 FL (ref 7.5–11.1)
POTASSIUM SERPL-SCNC: 3.8 MMOL/L (ref 3.5–5.1)
RBC # BLD: 4.31 M/CU MM (ref 4.2–5.4)
SARS-COV-2, NAAT: NOT DETECTED
SEGMENTED NEUTROPHILS ABSOLUTE COUNT: 2.1 K/CU MM
SEGMENTED NEUTROPHILS RELATIVE PERCENT: 53.5 % (ref 36–66)
SODIUM BLD-SCNC: 140 MMOL/L (ref 135–145)
SOURCE: NORMAL
TOTAL IMMATURE NEUTOROPHIL: 0.01 K/CU MM
TOTAL NUCLEATED RBC: 0 K/CU MM
TOTAL PROTEIN: 7 GM/DL (ref 6.4–8.2)
TROPONIN T: <0.01 NG/ML
WBC # BLD: 4 K/CU MM (ref 4–10.5)

## 2022-01-10 PROCEDURE — 2709999900 HC NON-CHARGEABLE SUPPLY

## 2022-01-10 PROCEDURE — 84484 ASSAY OF TROPONIN QUANT: CPT

## 2022-01-10 PROCEDURE — 87635 SARS-COV-2 COVID-19 AMP PRB: CPT

## 2022-01-10 PROCEDURE — 71045 X-RAY EXAM CHEST 1 VIEW: CPT

## 2022-01-10 PROCEDURE — 92920 PRQ TRLUML C ANGIOP 1ART&/BR: CPT

## 2022-01-10 PROCEDURE — 6360000004 HC RX CONTRAST MEDICATION

## 2022-01-10 PROCEDURE — 6370000000 HC RX 637 (ALT 250 FOR IP): Performed by: INTERNAL MEDICINE

## 2022-01-10 PROCEDURE — 85025 COMPLETE CBC W/AUTO DIFF WBC: CPT

## 2022-01-10 PROCEDURE — C1894 INTRO/SHEATH, NON-LASER: HCPCS

## 2022-01-10 PROCEDURE — 2580000003 HC RX 258: Performed by: INTERNAL MEDICINE

## 2022-01-10 PROCEDURE — 80053 COMPREHEN METABOLIC PANEL: CPT

## 2022-01-10 PROCEDURE — 6370000000 HC RX 637 (ALT 250 FOR IP)

## 2022-01-10 PROCEDURE — 93458 L HRT ARTERY/VENTRICLE ANGIO: CPT

## 2022-01-10 PROCEDURE — C1725 CATH, TRANSLUMIN NON-LASER: HCPCS

## 2022-01-10 PROCEDURE — 6360000002 HC RX W HCPCS

## 2022-01-10 PROCEDURE — 99283 EMERGENCY DEPT VISIT LOW MDM: CPT

## 2022-01-10 PROCEDURE — 85610 PROTHROMBIN TIME: CPT

## 2022-01-10 PROCEDURE — 93308 TTE F-UP OR LMTD: CPT

## 2022-01-10 PROCEDURE — 6360000002 HC RX W HCPCS: Performed by: INTERNAL MEDICINE

## 2022-01-10 PROCEDURE — 93005 ELECTROCARDIOGRAM TRACING: CPT | Performed by: PHYSICIAN ASSISTANT

## 2022-01-10 PROCEDURE — 85347 COAGULATION TIME ACTIVATED: CPT

## 2022-01-10 PROCEDURE — C1887 CATHETER, GUIDING: HCPCS

## 2022-01-10 PROCEDURE — C1769 GUIDE WIRE: HCPCS

## 2022-01-10 RX ORDER — METHYLPREDNISOLONE SODIUM SUCCINATE 125 MG/2ML
125 INJECTION, POWDER, LYOPHILIZED, FOR SOLUTION INTRAMUSCULAR; INTRAVENOUS ONCE
Status: COMPLETED | OUTPATIENT
Start: 2022-01-10 | End: 2022-01-10

## 2022-01-10 RX ORDER — ATROPINE SULFATE 0.4 MG/ML
0.5 AMPUL (ML) INJECTION
Status: DISCONTINUED | OUTPATIENT
Start: 2022-01-10 | End: 2022-01-10 | Stop reason: HOSPADM

## 2022-01-10 RX ORDER — SODIUM CHLORIDE 0.9 % (FLUSH) 0.9 %
5-40 SYRINGE (ML) INJECTION PRN
Status: DISCONTINUED | OUTPATIENT
Start: 2022-01-10 | End: 2022-01-10 | Stop reason: HOSPADM

## 2022-01-10 RX ORDER — ASPIRIN 81 MG/1
81 TABLET, CHEWABLE ORAL DAILY
Status: DISCONTINUED | OUTPATIENT
Start: 2022-01-11 | End: 2022-01-10

## 2022-01-10 RX ORDER — ATORVASTATIN CALCIUM 40 MG/1
40 TABLET, FILM COATED ORAL NIGHTLY
Status: DISCONTINUED | OUTPATIENT
Start: 2022-01-10 | End: 2022-01-10

## 2022-01-10 RX ORDER — SODIUM CHLORIDE 9 MG/ML
25 INJECTION, SOLUTION INTRAVENOUS PRN
Status: DISCONTINUED | OUTPATIENT
Start: 2022-01-10 | End: 2022-01-10 | Stop reason: HOSPADM

## 2022-01-10 RX ORDER — ATORVASTATIN CALCIUM 20 MG/1
20 TABLET, FILM COATED ORAL NIGHTLY
Status: CANCELLED | OUTPATIENT
Start: 2022-01-10

## 2022-01-10 RX ORDER — ACETAMINOPHEN 325 MG/1
650 TABLET ORAL EVERY 4 HOURS PRN
Status: DISCONTINUED | OUTPATIENT
Start: 2022-01-10 | End: 2022-01-10 | Stop reason: HOSPADM

## 2022-01-10 RX ORDER — ASPIRIN 325 MG
325 TABLET ORAL ONCE
Status: DISCONTINUED | OUTPATIENT
Start: 2022-01-10 | End: 2022-01-10

## 2022-01-10 RX ORDER — SODIUM CHLORIDE 0.9 % (FLUSH) 0.9 %
5-40 SYRINGE (ML) INJECTION EVERY 12 HOURS SCHEDULED
Status: DISCONTINUED | OUTPATIENT
Start: 2022-01-10 | End: 2022-01-10 | Stop reason: HOSPADM

## 2022-01-10 RX ORDER — ATORVASTATIN CALCIUM 80 MG/1
80 TABLET, FILM COATED ORAL NIGHTLY
Qty: 30 TABLET | Refills: 3 | Status: SHIPPED | OUTPATIENT
Start: 2022-01-10

## 2022-01-10 RX ORDER — SODIUM CHLORIDE 9 MG/ML
INJECTION, SOLUTION INTRAVENOUS CONTINUOUS
Status: DISCONTINUED | OUTPATIENT
Start: 2022-01-10 | End: 2022-01-10 | Stop reason: HOSPADM

## 2022-01-10 RX ORDER — ONDANSETRON 4 MG/1
4 TABLET, ORALLY DISINTEGRATING ORAL EVERY 8 HOURS PRN
Status: DISCONTINUED | OUTPATIENT
Start: 2022-01-10 | End: 2022-01-10 | Stop reason: HOSPADM

## 2022-01-10 RX ORDER — RANOLAZINE 500 MG/1
500 TABLET, EXTENDED RELEASE ORAL 2 TIMES DAILY
Qty: 60 TABLET | Refills: 3 | Status: SHIPPED | OUTPATIENT
Start: 2022-01-10 | End: 2022-09-20

## 2022-01-10 RX ORDER — POLYETHYLENE GLYCOL 3350 17 G/17G
17 POWDER, FOR SOLUTION ORAL DAILY PRN
Status: DISCONTINUED | OUTPATIENT
Start: 2022-01-10 | End: 2022-01-10 | Stop reason: HOSPADM

## 2022-01-10 RX ORDER — NITROGLYCERIN 0.4 MG/1
0.4 TABLET SUBLINGUAL EVERY 5 MIN PRN
Status: DISCONTINUED | OUTPATIENT
Start: 2022-01-10 | End: 2022-01-10 | Stop reason: HOSPADM

## 2022-01-10 RX ORDER — ATORVASTATIN CALCIUM 80 MG/1
80 TABLET, FILM COATED ORAL NIGHTLY
Status: DISCONTINUED | OUTPATIENT
Start: 2022-01-10 | End: 2022-01-10 | Stop reason: HOSPADM

## 2022-01-10 RX ORDER — SODIUM CHLORIDE 9 MG/ML
INJECTION, SOLUTION INTRAVENOUS CONTINUOUS
Status: DISCONTINUED | OUTPATIENT
Start: 2022-01-10 | End: 2022-01-10 | Stop reason: ALTCHOICE

## 2022-01-10 RX ORDER — RANOLAZINE 500 MG/1
500 TABLET, EXTENDED RELEASE ORAL 2 TIMES DAILY
Status: DISCONTINUED | OUTPATIENT
Start: 2022-01-10 | End: 2022-01-10 | Stop reason: HOSPADM

## 2022-01-10 RX ORDER — METOPROLOL SUCCINATE 25 MG/1
25 TABLET, EXTENDED RELEASE ORAL NIGHTLY
Status: DISCONTINUED | OUTPATIENT
Start: 2022-01-10 | End: 2022-01-10 | Stop reason: SDUPTHER

## 2022-01-10 RX ORDER — CLOPIDOGREL BISULFATE 75 MG/1
75 TABLET ORAL DAILY
Status: DISCONTINUED | OUTPATIENT
Start: 2022-01-11 | End: 2022-01-10 | Stop reason: ALTCHOICE

## 2022-01-10 RX ORDER — ACETAMINOPHEN 650 MG/1
650 SUPPOSITORY RECTAL EVERY 6 HOURS PRN
Status: DISCONTINUED | OUTPATIENT
Start: 2022-01-10 | End: 2022-01-10 | Stop reason: HOSPADM

## 2022-01-10 RX ORDER — ASPIRIN 81 MG/1
81 TABLET, CHEWABLE ORAL DAILY
Status: DISCONTINUED | OUTPATIENT
Start: 2022-01-11 | End: 2022-01-10 | Stop reason: HOSPADM

## 2022-01-10 RX ORDER — ACETAMINOPHEN 325 MG/1
650 TABLET ORAL EVERY 6 HOURS PRN
Status: DISCONTINUED | OUTPATIENT
Start: 2022-01-10 | End: 2022-01-10 | Stop reason: ALTCHOICE

## 2022-01-10 RX ORDER — NITROGLYCERIN 0.4 MG/1
0.4 TABLET SUBLINGUAL ONCE
Status: DISCONTINUED | OUTPATIENT
Start: 2022-01-10 | End: 2022-01-10 | Stop reason: HOSPADM

## 2022-01-10 RX ORDER — LOSARTAN POTASSIUM AND HYDROCHLOROTHIAZIDE 12.5; 5 MG/1; MG/1
2 TABLET ORAL NIGHTLY
Qty: 30 TABLET | Refills: 3
Start: 2022-01-11 | End: 2022-09-20 | Stop reason: DRUGHIGH

## 2022-01-10 RX ORDER — ONDANSETRON 2 MG/ML
4 INJECTION INTRAMUSCULAR; INTRAVENOUS EVERY 6 HOURS PRN
Status: DISCONTINUED | OUTPATIENT
Start: 2022-01-10 | End: 2022-01-10 | Stop reason: HOSPADM

## 2022-01-10 RX ORDER — METOPROLOL SUCCINATE 25 MG/1
25 TABLET, EXTENDED RELEASE ORAL DAILY
Status: DISCONTINUED | OUTPATIENT
Start: 2022-01-10 | End: 2022-01-10 | Stop reason: HOSPADM

## 2022-01-10 RX ADMIN — SODIUM CHLORIDE: 9 INJECTION, SOLUTION INTRAVENOUS at 11:33

## 2022-01-10 RX ADMIN — METOPROLOL SUCCINATE 25 MG: 25 TABLET, EXTENDED RELEASE ORAL at 17:51

## 2022-01-10 RX ADMIN — RANOLAZINE 500 MG: 500 TABLET, FILM COATED, EXTENDED RELEASE ORAL at 17:51

## 2022-01-10 RX ADMIN — METHYLPREDNISOLONE SODIUM SUCCINATE 125 MG: 125 INJECTION, POWDER, FOR SOLUTION INTRAMUSCULAR; INTRAVENOUS at 12:17

## 2022-01-10 ASSESSMENT — PAIN DESCRIPTION - LOCATION: LOCATION: CHEST;ARM

## 2022-01-10 ASSESSMENT — PAIN SCALES - GENERAL: PAINLEVEL_OUTOF10: 5

## 2022-01-10 ASSESSMENT — ENCOUNTER SYMPTOMS
VOMITING: 0
ABDOMINAL PAIN: 1
BACK PAIN: 0
NAUSEA: 0
SHORTNESS OF BREATH: 1

## 2022-01-10 ASSESSMENT — PAIN DESCRIPTION - PAIN TYPE: TYPE: ACUTE PAIN

## 2022-01-10 ASSESSMENT — PAIN DESCRIPTION - DESCRIPTORS: DESCRIPTORS: HEAVINESS

## 2022-01-10 ASSESSMENT — PAIN DESCRIPTION - ORIENTATION: ORIENTATION: LEFT

## 2022-01-10 NOTE — CONSULTS
[address], [address] [facility]                                  CONSULTATION    PATIENT NAME: Chiquis Asif                     :        1950  MED REC NO:   [id number]                          ROOM:       ED05  ACCOUNT NO:   [de-identified]                           ADMIT DATE: 01/10/2022  PROVIDER:     Lorrie Colin MD    CONSULT DATE:  01/10/2022    INDICATION:  Chest pain. HISTORY OF PRESENT ILLNESS:  This is a 76-year-old female patient who  came in to the hospital with having left-sided chest pain. She is  having pain in the left arm with exertion and relieved with resting. This has been going on for the last about four weeks. It has gotten  just worse over the weekend. Also, the pain goes to the jaw also. She  describes the pain as similar when she had her angioplasty. No fever,  no chills. No cough. No other  or GI complaints present. The  patient is being tested for COVID. The patient did have her COVID test  also. The patient had an echo done back in 2021. Echo shows that LV  function was preserved at that time. The patient had a stress test done  last year. The stress test was normal.  The patient had a heart cath  done in 2021. The heart cath showed the left main was patent. LAD  had [id number] 80% stenosis, which was stented with a 2.25 stent. GuideLiner was used. Circ had mild disease. OM stent was patent. RCA  was patent. She was here in 2021 with angioplasty and then came back in 2021  with symptoms. The workup was negative at that time. PAST MEDICAL HISTORY:  History of coronary artery disease status post  angioplasty done in 2021 of the LAD. Hypertension and  hyperlipidemia. PAST SURGICAL HISTORY:  Angioplasty of the LAD and circumflex artery. SOCIAL HISTORY:  She does not smoke and does not drink. ALLERGIES:  IODINE and IBUPROFEN.     MEDICATIONS AT HOME:  She is on aspirin, Hyzaar, Toprol, and Plavix. PHYSICAL EXAMINATION:  GENERAL:  The patient is awake, alert, answers questions, not in acute  distress. VITAL SIGNS:  Temperature is afebrile, pulse is 75, blood pressure is  135/80. HEENT:  Head is normocephalic, atraumatic. Pupils are equal, round, and  reactive to light. CHEST:  Equal expansion. LUNGS:  Clear to auscultation. No wheezing or rhonchi noted. HEART:  Regular rate and rhythm. ABDOMEN:  Soft and nontender. Bowel sounds are present. No  hepatosplenomegaly or guarding appreciated. EXTREMITIES:  No cyanosis noted. NEUROLOGIC:  Cranial nerves II through XII grossly intact. DIAGNOSTIC DATA:  EKG shows a sinus rhythm with no acute ST changes  noted. LABORATORY DATA:  Her COVID is pending. Her BUN is 22, creatinine 1.3. Troponins are negative. LFTs are normal.  CBC is normal.    IMPRESSION:  A 57-year-old female patient with a history of coronary  artery disease. Ongoing symptoms, new onset. So, the plan is for heart cath today. We will make further  recommendations based on that.         Quinton Moise MD    D: 01/10/2022 10:55:20       T: 01/10/2022 10:57:54     [id number]  Job#: [id number]     Doc#: [id number]    CC:

## 2022-01-10 NOTE — ED PROVIDER NOTES
Emergency Department Encounter    Patient: Michael Chopra  MRN: [id number]  : 1950  Date of Evaluation: 1/10/2022  ED Provider:  Yina Roman DO    Triage Chief Complaint:   Chest Pain      History Provided By: Patient and       Michael Chopra is a 66 y.o. female past medical history of HLD, HTN, and CAD status post PCI who presents with chest pain in bed 5. Patient states the chest pain started yesterday morning after waking up. She says it is constant, waxing and waning in severity, left-sided with radiation to the both sides of her neck, feels like \"someone is sitting on my chest,\" and worse with exertion. She also admits to some shortness of breath and dizziness when exerting herself. Patient's  reports that this seems like it is similar to when she had her stents placed previously. Denies fevers, cough, congestion, abdominal pain, nausea/vomiting, urinary symptoms, lower extremity swelling or pain, or any other complaints at this time. REVIEW OF SYSTEMS  Review of Systems   Constitutional: Negative for fever. HENT: Negative for congestion. Eyes: Negative for visual disturbance. Respiratory: Positive for shortness of breath. Cardiovascular: Positive for chest pain. Negative for leg swelling. Gastrointestinal: Positive for abdominal pain. Negative for nausea and vomiting. Genitourinary: Positive for difficulty urinating. Musculoskeletal: Positive for neck pain. Negative for back pain. Skin: Negative for rash. Neurological: Positive for light-headedness. Negative for headaches.        PAST MEDICAL HISTORY  Past Medical History:   Diagnosis Date    Hyperlipidemia     Hypertension     \"on medication since \"       FAMILY HISTORY  Family History   Problem Relation Age of Onset    Stroke Mother     No Known Problems Father     Heart Disease Brother        SOCIAL HISTORY  Social History     Socioeconomic History    Marital status:      Spouse name: None    Number of children: None    Years of education: None    Highest education level: None   Occupational History    None   Tobacco Use    Smoking status: Never Smoker    Smokeless tobacco: Never Used   Substance and Sexual Activity    Alcohol use: Yes     Comment: Socially    Drug use: No    Sexual activity: Never       SURGICAL HISTORY  Past Surgical History:   Procedure Laterality Date    ABDOMINOPLASTY  10+ yrs ago    \"tummy tuck\"    BREAST REDUCTION SURGERY  2012    belle    CHOLECYSTECTOMY, LAPAROSCOPIC  05/10/2017    CORONARY ANGIOPLASTY WITH STENT PLACEMENT      HYSTERECTOMY  age 44    left both ovaries\"    KNEE ARTHROPLASTY Right 2015    THYROID SURGERY      right side of thyroid removed       CURRENT MEDICATIONS  Current Outpatient Rx   Medication Sig Dispense Refill    aspirin 81 MG chewable tablet Take 1 tablet by mouth daily 30 tablet 3    nitroGLYCERIN (NITROSTAT) 0.4 MG SL tablet up to max of 3 total doses. If no relief after 1 dose, call 911. 25 tablet 3    atorvastatin (LIPITOR) 20 MG tablet Take 1 tablet by mouth nightly 30 tablet 3    losartan-hydrochlorothiazide (HYZAAR) 50-12.5 MG per tablet Take 2 tablets by mouth nightly 30 tablet 3    metoprolol succinate (TOPROL XL) 25 MG extended release tablet Take 1 tablet by mouth nightly 30 tablet 3    clopidogrel (PLAVIX) 75 MG tablet Take 1 tablet by mouth daily 30 tablet 3    meloxicam (MOBIC) 15 MG tablet Take 15 mg by mouth daily as needed for Pain      Fexofenadine HCl (ALLEGRA PO) Take by mouth         ALLERGIES  Allergies   Allergen Reactions    Dye [Iodides] Hives     \"Broke out in hives and got real red and hot. \"    Ibuprofen Other (See Comments)     \"Slows heart rate way down\"       PHYSICAL EXAM  VITAL SIGNS: BP (!) 141/100   Pulse 79   Temp 97.5 °F (36.4 °C) (Oral)   Resp 18   Ht 5' 7\" (1.702 m)   Wt 140 lb (63.5 kg)   SpO2 100%   BMI 21.93 kg/m²   Constitutional: Patient appears stated age, Well nourished, non-toxic appearing. Sitting on the bed in no acute distress. Pleasant.  at bedside. HENT: Atraumatic, Normocephalic, Bilateral external ears normal,  Nose normal, Oropharynx moist.  Eyes: EOMI, No discharge, Conjunctiva normal, no scleral icterus. Neck: Normal range of motion, Supple, tenderness over the bilateral cervical paraspinal musculature without midline tenderness. Cardiovascular: Heart rate and rhythm regular, no murmurs. Thorax & Lungs: Clear breath sounds, no acute respiratory distress on room air. No wheezing, rhonchi, or rales. Left-sided chest wall tenderness. Skin: Warm, Dry. Abdomen: Soft, no tenderness. No distention. Bowel sounds present. Extremities: No tenderness, edema, or major deformities noted. Good range of motion in all major joints. Neurologic: GCS 15. Alert & oriented. Cranial nerves grossly intact. Moving all extremities spontaneously, no focal deficits noted.   Psychiatric: Affect normal, Mood normal , Judgment normal.    I have reviewed and interpreted all of the currently available lab results and diagnostics from this visit:  Results for orders placed or performed during the hospital encounter of 01/10/22   CBC auto diff   Result Value Ref Range    WBC 4.0 4.0 - 10.5 K/CU MM    RBC 4.31 4.2 - 5.4 M/CU MM    Hemoglobin 12.9 12.5 - 16.0 GM/DL    Hematocrit 39.7 37 - 47 %    MCV 92.1 78 - 100 FL    MCH 29.9 27 - 31 PG    MCHC 32.5 32.0 - 36.0 %    RDW 12.3 11.7 - 14.9 %    Platelets 399 792 - 797 K/CU MM    MPV 10.2 7.5 - 11.1 FL    Differential Type AUTOMATED DIFFERENTIAL     Segs Relative 53.5 36 - 66 %    Lymphocytes % 32.4 24 - 44 %    Monocytes % 6.5 (H) 0 - 4 %    Eosinophils % 6.0 (H) 0 - 3 %    Basophils % 1.3 (H) 0 - 1 %    Segs Absolute 2.1 K/CU MM    Lymphocytes Absolute 1.3 K/CU MM    Monocytes Absolute 0.3 K/CU MM    Eosinophils Absolute 0.2 K/CU MM    Basophils Absolute 0.1 K/CU MM    Nucleated RBC % 0.0 %    Total Nucleated RBC 0.0 K/CU MM    Total Immature Neutrophil 0.01 K/CU MM    Immature Neutrophil % 0.3 0 - 0.43 %   CMP   Result Value Ref Range    Sodium 140 135 - 145 MMOL/L    Potassium 3.8 3.5 - 5.1 MMOL/L    Chloride 102 99 - 110 mMol/L    CO2 28 21 - 32 MMOL/L    BUN 22 6 - 23 MG/DL    CREATININE 1.3 (H) 0.6 - 1.1 MG/DL    Glucose 157 (H) 70 - 99 MG/DL    Calcium 9.6 8.3 - 10.6 MG/DL    Albumin 4.4 3.4 - 5.0 GM/DL    Total Protein 7.0 6.4 - 8.2 GM/DL    Total Bilirubin 0.4 0.0 - 1.0 MG/DL    ALT 11 10 - 40 U/L    AST 17 15 - 37 IU/L    Alkaline Phosphatase 81 40 - 128 IU/L    GFR Non- 40 (L) >60 mL/min/1.73m2    GFR  49 (L) >60 mL/min/1.73m2    Anion Gap 10 4 - 16   Troponin   Result Value Ref Range    Troponin T <0.010 <0.01 NG/ML   EKG 12 Lead   Result Value Ref Range    Ventricular Rate 71 BPM    Atrial Rate 71 BPM    P-R Interval 210 ms    QRS Duration 80 ms    Q-T Interval 374 ms    QTc Calculation (Bazett) 406 ms    P Axis 87 degrees    R Axis -42 degrees    T Axis 38 degrees    Diagnosis       Sinus rhythm with 1st degree AV block  Left axis deviation  Pulmonary disease pattern  Abnormal ECG  When compared with ECG of 17-MAY-2021 10:44,  No significant change was found       XR CHEST PORTABLE    Result Date: 1/10/2022  EXAMINATION: ONE XRAY VIEW OF THE CHEST 1/10/2022 9:38 am COMPARISON: 05/17/2021 HISTORY: ORDERING SYSTEM PROVIDED HISTORY: Chest pain FINDINGS: Mediastinum: The cardiomediastinal silhouette is normal. Vascular calcifications of the aortic arch. Lungs: The lungs are clear. Pleura: No pleural effusion. No pneumothorax. Bones and soft tissues: No acute osseous abnormality. Surgical clips in the right upper abdominal quadrant. No acute cardiopulmonary abnormality.        Medications Administered in Emergency Department  Medications   aspirin tablet 325 mg (has no administration in time range)   nitroGLYCERIN (NITROSTAT) SL tablet 0.4 mg (has no administration in time range)   0.9 % sodium chloride infusion (has no administration in time range)   methylPREDNISolone sodium (SOLU-MEDROL) injection 125 mg (has no administration in time range)       COURSE & MEDICAL DECISION MAKING  66 y.o. female past medical history of HLD, HTN, and CAD status post PCI who presents with chest pain. Afebrile. Mildly hypertensive. On physical exam, patient is sitting in the bed in no acute distress, nontoxic-appearing. Heart rate and rhythm regular, lungs CTAB. She does have some cervical paraspinal musculature tenderness. Lower extremities without swelling or pain. Per chart review, stress test in April of this year showed no EKG changes suggestive of ischemia with normal perfusion uptake, EF 85%. Today, CBC and BMP grossly unremarkable. Troponin undetectable. EKG with first degree AV block, otherwise no acute signs of ischemia or artifact dysrhythmias. Chest x-ray consolidation, effusion, or widened mediastinum. Rapid COVID-negative. Cardiologist Dr. Regis Hobson seen and evaluated the patient at bedside, and determined she be hospitalized and have cardiac cath done today. Spoke with the hospitalist Dr. Alana Pena accepted the patient. Patient and  were agreeable with the plan for her to stay, and patient was hospitalized in stable condition at this time. I have seen this patient in conjunction with the attending physician Dr. Leyda Starks, and they agree with workup and disposition. Final Impression      1. Chest pain, unspecified type    2.  History of coronary angioplasty with insertion of stent        DISPOSITION Decision To Admit 01/10/2022 10:28:59 AM     (Please note that portions of this note may have been completed with a voice recognition program. Efforts were made to edit the dictations but occasionally words are mis-transcribed.)    Electronically Signed by Doug Cardozo DO, 1/10/2022  [address] [facility],   01/10/22 [id number]

## 2022-01-10 NOTE — OP NOTE
Operative Note      Patient: Tino Fuentes  YOB: 1950  MRN: [id number]    Date of Procedure: 1/10/22    Pre-Op Diagnosis: unstable angina     Post-Op Diagnosis: Same       Estimated Blood Loss (mL): less than 50     Complications: None    Electronically signed by Abdullahi Winston MD on 1/10/2022 at 1:29 PM    DICTATED --[id number]  LEFT MAIN PATENT  LAD DISTAL INSTENT 80% TO 0%-POBA-2.5X20MM BALLOON   LCX PATENT  OM STENT PATENT  RCA MILD DX  LVEDP 10  ASA AND BRILANTA AND HEPARIN  HOME LATER TODAY  F/U IN OFFICE TOMORROW  NO COMPLICATIONS  RIGHT RADIAL  CHANGE FROM [facility] TO [facility]     Electronically signed by Abdullahi Winston MD on 1/10/22 at 1:34 PM EST

## 2022-01-10 NOTE — H&P
History and Physical      Name:  Telma Gilman /Age/Sex: 1950  (66 y.o. female)   MRN & CSN:  [id number] & [id number] Admission Date/Time: 1/10/2022  9:53 AM   Location:  ED05/ED-05 PCP: Juliano Herrera MD       Hospital Day: 1    Assessment and Plan:   Is a 71-year-old female with a past medical history of hypertension, hyperlipidemia, coronary artery disease status post stents past presented with chest pain. Unstable angina  History of coronary artery disease status post stents in the past  -Case discussed with emergency room; persistent pain over the last 24 hours  -Cardiology consult emergency room recommended cardiac cath this afternoon  -Last echo reviewed with EF of 50 to 55%  -Continue home antiplatelet therapy  -Statin therapy  -Telemetry  -Patient admitted to stepdown pending cath results  -Cardiology following appreciate recommendations    TONY  -Mild at 1.3  -Given need for this afternoon we will start gentle IV fluid hydration  -Repeat morning    Chronic medical conditions: Medications resumed less contraindicated  Hypertension  Hyperlipidemia    Diet No diet orders on file   DVT Prophylaxis [] Lovenox, []  Heparin, [] SCDs, [] Ambulation   GI Prophylaxis [] PPI,  [] H2 Blocker,  [] Carafate,  [] Diet/Tube Feeds   Code Status Prior   Disposition Patient requires continued admission due to    MDM [] Low, [] Moderate,[]  High  Patient's risk as above due to      History of Present Illness:   Is a 71-year-old female with a past medical history of hypertension, hyperlipidemia, coronary artery disease status post stents past presented with chest pain. Patient states her chest pain began yesterday occurring in episodes worse with any movement or exertion. States it was substernal with radiation to her left arm. Endorses some shortness of breath with the pain. Due to the nature of the pain and it reminding her of her prior MI she decided to come the ED this morning.     In the ED the patient was resting comfortably states that her pain is better. Again states that it has been coming and episodes. Denies any fevers chills any nausea or vomiting. Ten point ROS reviewed negative, unless as noted above. In the ED patient was afebrile and her vital signs are stable. Labs significant for mild elevated creatinine to 1.3; troponin was less than 0.010 and EKG had no acute ischemic changes. Patient was seen and evaluated by cardiology emergency room who recommended a cardiac cath this afternoon. Objective:   No intake or output data in the 24 hours ending 01/10/22 1104   Vitals:   Vitals:    01/10/22 0914   BP: (!) 141/100   Pulse: 79   Resp: 18   Temp: 97.5 °F (36.4 °C)   SpO2: 100%     Physical Exam:   GEN Awake female, sitting upright in bed in no apparent distress. Appears given age. EYES Pupils are equally round. No scleral erythema, discharge, or conjunctivitis. NECK Supple, no apparent thyromegaly or masses. RESP Clear to auscultation, no wheezes, rales or rhonchi. Symmetric chest movement while on room air. CARDIO/VASC S1/S2 auscultated. Regular rate without appreciable murmurs, rubs, or gallops. GI Abdomen is soft without significant tenderness, masses, or guarding.  No costovertebral angle tenderness  HEME/LYMPH No petechiae or ecchymoses. MSK No gross joint deformities. SKIN Normal coloration, warm, dry. NEURO Cranial nerves appear grossly intact, normal speech  PSYCH Awake, alert, oriented x 4. Affect appropriate. Past Medical History:      Past Medical History:   Diagnosis Date    Hyperlipidemia     Hypertension     \"on medication since 2015\"     PSHX:  has a past surgical history that includes Knee Arthroplasty (Right, 2015); Abdominoplasty (10+ yrs ago); Breast reduction surgery (2012); Hysterectomy (age 44); Cholecystectomy, laparoscopic (05/10/2017); Coronary angioplasty with stent; and Thyroid surgery. Allergies:    Allergies   Allergen Reactions    the Last Year: Not on file    Unstable Housing in the Last Year: Not on file       Medications:   Medications:    aspirin  325 mg Oral Once    nitroGLYCERIN  0.4 mg SubLINGual Once    methylPREDNISolone  125 mg IntraVENous Once      Infusions:    sodium chloride       PRN Meds:        Electronically signed by Hillis Dance, MD on 1/10/2022 at 11:04 AM

## 2022-01-10 NOTE — ED PROVIDER NOTES
As provider-in-triage, I performed a medical screening history and physical exam on this patient. HISTORY OF PRESENT ILLNESS  Michael Chopra is a 66 y.o. female who presents to the emergency department today with left-sided chest pain that has been intermittent over the last week but intensifying over the last day. Located to the left chest.  It does appear to be exertional, she describes a dull achy pain that radiates into her left chest and up to her left upper extremity. Has a history of a cardiac stent in her past.  Is also treated for hypertension, hyperlipidemia. Has seen Dr. Noel Parker. Had a recent appointment in April. Denies any upper respiratory symptoms. No fever cough or chills. No leg swelling. No hemoptysis, no cough. No unilateral leg swelling. No recent hospitalization. Not anticoagulated    PHYSICAL EXAM  BP (!) 141/100   Pulse 79   Temp 97.5 °F (36.4 °C) (Oral)   Resp 18   Ht 5' 7\" (1.702 m)   Wt 140 lb (63.5 kg)   SpO2 100%   BMI 21.93 kg/m²     On exam, the patient appears in no acute distress. Speech is clear. Breathing is unlabored. Moves all extremities    Comment: Please note this report has been produced using speech recognition software and may contain errors related to that system including errors in grammar, punctuation, and spelling, as well as words and phrases that may be inappropriate. If there are any questions or concerns please feel free to contact the dictating provider for clarification.         Lenka Barlow 411, PA  01/10/22 4423

## 2022-01-10 NOTE — PROGRESS NOTES
Pt transported to main entrance via wheelchair by this RN for discharge home with spouse. Rt radial site remains free of bleeding, previous hematoma is soft, bruising noted. Radial armboard secured to right wrist.  Pt has office visit scheduled 1/11/22 for site assessment.

## 2022-01-10 NOTE — ED NOTES
Report from [facility]. Pt changed into gown. Belongings in bags.      Tita Ramos, RN  01/10/22 9574

## 2022-01-10 NOTE — PROGRESS NOTES
TR Band removed, sterile dressing applied. Site of free of bleeding, or hematoma. Site is bruised from wrist to mid-forearm. Will continue to monitor.

## 2022-01-10 NOTE — CONSULTS
Will dictate full note  Cath today   [id number]  Unstable angina    Hx of PCI of LAD and OM    Electronically signed by Alan Jimenez MD on 1/10/22 at 12:32 PM EST

## 2022-01-10 NOTE — ED PROVIDER NOTES
EKG Interpretation    Interpreted by emergency department physician from January 10 at 920    Rhythm: normal sinus   Rate: normal  Axis: left  Ectopy: none  Conduction: normal  ST Segments: no acute change  T Waves: no acute change  Q Waves: none    Clinical Impression: Normal sinus rhythm with a rate of 72, no ischemia or ectopy, left axis    MD Deana Nguyen MD  01/10/22 9345    ATTENDING NOTE:    I discussed this patient's history and physical findings and reviewed the PA's findings with them, as well as performed an independent assessment and coordinated care with them. My additional findings are: Patient is resting in bed, is pleasant, family at bedside. She states that this chest pressure is similar to when she required stenting in the past.  She follows with Dr. Oneil Scott. HISTORY OF PRESENT ILLNESS:  Chief Complaint   Patient presents with    Chest Pain   . Lita Hodges is a 66 y.o. female who presents with chest pressure that is been waxing waning over the past month but more significant over the past 5 days, it worsens with any attempted exertion and improves but does not resolve at rest.      PHYSICAL EXAM:  VITAL SIGNS:   ED Triage Vitals [01/10/22 0914]   Enc Vitals Group      BP (!) 141/100      Pulse 79      Resp 18      Temp 97.5 °F (36.4 °C)      Temp Source Oral      SpO2 100 %      Weight 140 lb (63.5 kg)      Height 5' 7\" (1.702 m)      Head Circumference       Peak Flow       Pain Score       Pain Loc       Pain Edu? Excl. in [address]? Vitals during ED course were reviewed and are as charted.     Key Physical Exam Findings:    Constitutional: Minimal distress, Non-toxic appearance    Eyes:  Conjunctiva normal, No discharge    HENT: Normocephalic, Atraumatic, Bilateral external ears normal, posterior oropharynx is nonerythematous and without exudate, uvula is midline, no trismus, no \"hot potato voice\" or dysphonia, oropharynx moist    Neck: Supple, no stridor, no grossly visible or palpable masses    Cardiovascular: Regular rate and rhythm, No murmurs, No rubs, No gallops    Pulmonary/Chest:  Normal breath sounds, No respiratory distress or accessory muscle use, No wheezing, crackles or rhonchi. Abdomen:  Soft, nondistended and nonrigid, No tenderness or peritoneal signs, No masses, normal bowel sounds    Back:  No midline point tenderness, No paraspinous muscle tenderness.   No CVA tenderness    Extremities:  No gross deformities, no edema, no tenderness    Neurologic:  Normal motor function, Normal sensory function, No focal deficits    Skin:  Warm, Dry, No erythema, No rash, No cyanosis, No mottling    Lymphatic:  No lymphadenopathy in the following location(s): cervical    Psychiatric:  Alert and oriented x3, Affect normal          RADIOLOGY/PROCEDURES/LABS/MEDICATIONS ADMINISTERED:    I have reviewed and interpreted all of the currently available lab results from this visit (if applicable):  Results for orders placed or performed during the hospital encounter of 01/10/22   COVID-19, Rapid    Specimen: Nasopharyngeal   Result Value Ref Range    Source UNKNOWN     SARS-CoV-2, NAAT NOT DETECTED NOT DETECTED   CBC auto diff   Result Value Ref Range    WBC 4.0 4.0 - 10.5 K/CU MM    RBC 4.31 4.2 - 5.4 M/CU MM    Hemoglobin 12.9 12.5 - 16.0 GM/DL    Hematocrit 39.7 37 - 47 %    MCV 92.1 78 - 100 FL    MCH 29.9 27 - 31 PG    MCHC 32.5 32.0 - 36.0 %    RDW 12.3 11.7 - 14.9 %    Platelets 277 630 - 276 K/CU MM    MPV 10.2 7.5 - 11.1 FL    Differential Type AUTOMATED DIFFERENTIAL     Segs Relative 53.5 36 - 66 %    Lymphocytes % 32.4 24 - 44 %    Monocytes % 6.5 (H) 0 - 4 %    Eosinophils % 6.0 (H) 0 - 3 %    Basophils % 1.3 (H) 0 - 1 %    Segs Absolute 2.1 K/CU MM    Lymphocytes Absolute 1.3 K/CU MM    Monocytes Absolute 0.3 K/CU MM    Eosinophils Absolute 0.2 K/CU MM    Basophils Absolute 0.1 K/CU MM    Nucleated RBC % 0.0 %    Total Nucleated RBC 0.0 K/CU MM    Total Immature Neutrophil 0.01 K/CU MM    Immature Neutrophil % 0.3 0 - 0.43 %   CMP   Result Value Ref Range    Sodium 140 135 - 145 MMOL/L    Potassium 3.8 3.5 - 5.1 MMOL/L    Chloride 102 99 - 110 mMol/L    CO2 28 21 - 32 MMOL/L    BUN 22 6 - 23 MG/DL    CREATININE 1.3 (H) 0.6 - 1.1 MG/DL    Glucose 157 (H) 70 - 99 MG/DL    Calcium 9.6 8.3 - 10.6 MG/DL    Albumin 4.4 3.4 - 5.0 GM/DL    Total Protein 7.0 6.4 - 8.2 GM/DL    Total Bilirubin 0.4 0.0 - 1.0 MG/DL    ALT 11 10 - 40 U/L    AST 17 15 - 37 IU/L    Alkaline Phosphatase 81 40 - 128 IU/L    GFR Non- 40 (L) >60 mL/min/1.73m2    GFR  49 (L) >60 mL/min/1.73m2    Anion Gap 10 4 - 16   Troponin   Result Value Ref Range    Troponin T <0.010 <0.01 NG/ML   POC ACT-Low Range   Result Value Ref Range    Activated Clotting Time, Low Range 369 SEC   EKG 12 Lead   Result Value Ref Range    Ventricular Rate 71 BPM    Atrial Rate 71 BPM    P-R Interval 210 ms    QRS Duration 80 ms    Q-T Interval 374 ms    QTc Calculation (Bazett) 406 ms    P Axis 87 degrees    R Axis -42 degrees    T Axis 38 degrees    Diagnosis       Sinus rhythm with 1st degree AV block  Left axis deviation  Pulmonary disease pattern  Abnormal ECG  When compared with ECG of 17-MAY-2021 10:44,  No significant change was found            ABNORMAL LABS:  Labs Reviewed   CBC WITH AUTO DIFFERENTIAL - Abnormal; Notable for the following components:       Result Value    Monocytes % 6.5 (*)     Eosinophils % 6.0 (*)     Basophils % 1.3 (*)     All other components within normal limits   COMPREHENSIVE METABOLIC PANEL - Abnormal; Notable for the following components:    CREATININE 1.3 (*)     Glucose 157 (*)     GFR Non- 40 (*)     GFR  49 (*)     All other components within normal limits   COVID-19, RAPID   TROPONIN   MAGNESIUM   PROTIME/INR & PTT   POC ACT-LR           I have personally viewed the imaging studies.  The radiologist interpretation is:   XR CHEST PORTABLE   Final Result   No acute cardiopulmonary abnormality. PLAN/ED COURSE:  Last Vitals: BP (!) 148/71   Pulse 65   Temp 97.5 °F (36.4 °C) (Oral)   Resp 16   Ht 5' 7\" (1.702 m)   Wt 140 lb (63.5 kg)   SpO2 98%   BMI 21.93 kg/m²         79-year-old female presents with chest pain. Based on her history, we did consult cardiology and they plan to take her for cardiac catheterization this afternoon. She received aspirin with some improvement. She is otherwise stable, work-up not suggestive of aortic dissection, PE, pneumothorax or pneumonia. Clinical Impression:  1. Chest pain, unspecified type    2. History of coronary angioplasty with insertion of stent    3. S/P angioplasty        Disposition referral (if applicable):  No follow-up provider specified.     Disposition medications (if applicable):  Current Discharge Medication List          ED Provider Disposition Time  DISPOSITION Admitted 01/10/2022 11:03:56 AM            Electronically signed by Karin Alfaro MD on 1/10/2022 at 2:23 PM       Karin Alfaro MD  01/12/22 5111

## 2022-01-10 NOTE — PROGRESS NOTES
Pts  alerted this RN that pts right radial site was bleeding. Noted upon entering room patients right radial dressing was saturated. Manual pressure applied for 15 minutes. Hematoma noted proximal to insertion site. TR Band applied, 10cc air injected into TR Band. Second TR Band applied proximal to first TR Band at hematoma site. 15cc air injected into TR Band. VSS. Will continue to monitor.

## 2022-01-10 NOTE — PROCEDURES
[address] [place] [address], [address] [place] [address]                            CARDIAC CATHETERIZATION    PATIENT NAME: Rosalyn Heimlich                     :        1950  MED REC NO:   [id number]                          ROOM:  ACCOUNT NO:   [de-identified]                           ADMIT DATE: 01/10/2022  PROVIDER:     Peter Silva MD    DATE OF PROCEDURE:  01/10/2022    INDICATION FOR PROCEDURE:  Unstable angina. This is a 69-year-old female patient brought to cath lab today. Informed consent was obtained from the patient. The patient was prepped  and draped in the usual sterile fashion. The patient was injected with  5 mL of 2% lidocaine in the right radial region. Using a radial needle,  the right radial artery was cannulized, and a 5/6-Hebrew sheath was  placed in the right radial artery. The entire procedure was done using  a guidewire, sheath was flushed in between the procedures. Using a TIG catheter, left coronary angiogram was performed. Left  coronary angiogram revealed the left main is patent. It bifurcates into  LAD and circumflex artery. Circ is a medium-sized vessel. It gives off  an OM branch. OM has a stent present. Otherwise, circ is patent. LAD is a medium-sized vessel, reaches and wraps the apex. It gives off  the medium diagonal branch. LAD has a mid to distal stent present. There is a 90% in-stent stenosis  noted in the LAD present. BROOKE-3 flow is noted. Diagonal branch is  medium-sized vessel, has mild disease present. Right coronary artery is a medium-sized vessel, is a dominant vessel. It gives off the PD and PL branch. Mild disease present. Using a pigtail catheter, EDP was measured. EDP was around 10-15 mmHg  present. On the pullback, there was no gradient present across the  aortic valve. IMPRESSION:  1. Left main is patent.   2.  LAD mid to distal segment had in-stent stenosis of 80-90% present. 3.  Circ and OM stent is patent. 4.  RCA has mild disease noted. 5.  EDP was around 10-15 mmHg present. The plan is to proceed with intervention of the LAD. The patient already had a 6-Guyanese sheath. The patient was  anticoagulated with heparin. ACT was kept greater than 250. The  patient received Brilinta 180 mg postprocedure and aspirin 81 mg. Using a VL-3 guide, left main was engaged. Using a [employer] Elite wire,  lesion was crossed into LAD. Lesion was ballooned in-stent stenosis  with a 2.5 x 20 mm noncompliant balloon. Lesion decreased from 90% to  0%. A final angiogram showed the BROOKE-3 flow noted. No dissection,  perforation or distal embolization noted. IMPRESSION:  Successful angioplasty of the in-stent stenosis of the  mid-LAD. Lesion decreased from 80-90% to 0%. There was a balloon  angioplasty performed of the in-stent stenosis. The patient tolerated  the procedure well. No complications noted. The patient will be discharged home later today. Follow up in the  office tomorrow. The patient is loaded with Brilinta. We will switch  her from Plavix to Brilinta at this time because of restenosis. Continue statins. Add Ranexa.       Blood loss  20cc  Cardiac rehab consulted     Rubia Blount MD    D: 01/10/2022 13:32:58       T: 01/10/2022 13:35:54     [email or web address]  Job#: [id number]     Doc#: [id number]    CC:

## 2022-01-10 NOTE — DISCHARGE SUMMARY
Discharge Summary    Name:  Avinash Juares /Age/Sex: 1950  (15 y.o. female)   MRN & CSN:  [id number] & [id number] Admission Date/Time: 1/10/2022  9:53 AM   Attending:  No att. providers found Discharging Physician: Cheryle Sol, MD     Hospital Course: This is a 74-year-old female with a past medical history significant for coronary artery disease status post stenting in the past who presented with unstable angina. Patient was seen eval by cardiology to go to Cath Lab later in the afternoon. Was noted to have LAD disease and underwent PCI. Due to limited bed availability patient never received a bed assignment. Case was discussed with cardiology who are okay with discharge with noted changes to the medications including changing her Plavix to Brilinta. Patient will be discharged with follow-up with cardiology tomorrow morning with a repeat BMP and CBC given her mild TONY which she did receive IV fluid. Patient was discharged from cath holding area in stable condition and doing well. Prescriptions for her Brilinta were sent to her pharmacy and she will close follow-up with cardiology tomorrow morning. Unstable angina  History of coronary artery disease status post stents in the past  -Underwent angioplasty today and given no bed availability with no cath complication patient was discharged home; Miriam Leonardo script sent to patient's pharmacy  - Outpatient f/u with Cardiology tomorrow     TONY  -Mild and was given IVF  -Repeat BMP tomorrow prior to seeing Cardiology in office     Chronic medical conditions: Medications resumed less contraindicated  Hypertension  Hyperlipidemia    The patient expressed appropriate understanding of and agreement with the discharge recommendations, medications, and plan.      Consults this admission:  IP CONSULT TO HOSPITALIST  IP CONSULT TO CARDIOLOGY  IP CONSULT TO CARDIAC REHAB    Discharge Instruction:   Follow up appointments: Cardiology  Primary care physician: within 2 weeks    Diet:  cardiac diet   Activity: activity as tolerated  Disposition: Discharged to:   [x]Home, []C, []SNF, []Acute Rehab, []Hospice   Condition on discharge: Stable    Discharge Medications:        Medication List      START taking these medications    ranolazine 500 MG extended release tablet  Commonly known as: RANEXA  Take 1 tablet by mouth 2 times daily     ticagrelor 90 MG Tabs tablet  Commonly known as: BRILINTA  Take 1 tablet by mouth 2 times daily  Start taking on: January 11, 2022        CHANGE how you take these medications    atorvastatin 80 MG tablet  Commonly known as: LIPITOR  Take 1 tablet by mouth nightly  What changed:   · medication strength  · how much to take        CONTINUE taking these medications    ALLEGRA PO     aspirin 81 MG chewable tablet  Take 1 tablet by mouth daily     losartan-hydroCHLOROthiazide 50-12.5 MG per tablet  Commonly known as: HYZAAR  Take 2 tablets by mouth nightly  Start taking on: January 11, 2022     metoprolol succinate 25 MG extended release tablet  Commonly known as: Toprol XL  Take 1 tablet by mouth nightly     Mobic 15 MG tablet  Generic drug: meloxicam     nitroGLYCERIN 0.4 MG SL tablet  Commonly known as: NITROSTAT  up to max of 3 total doses. If no relief after 1 dose, call 911.         STOP taking these medications    clopidogrel 75 MG tablet  Commonly known as: PLAVIX           Where to Get Your Medications      These medications were sent to [place] [address] [phone]  [facility] [address] [place] [address], [address] [place] [address] Drive    Phone: [phone]   · atorvastatin 80 MG tablet  · ranolazine 500 MG extended release tablet  · ticagrelor 90 MG Tabs tablet     Information about where to get these medications is not yet available    Ask your nurse or doctor about these medications  · losartan-hydroCHLOROthiazide 50-12.5 MG per tablet         Objective Findings at Discharge:   BP (!) 146/56 Pulse 69   Temp 97.5 °F (36.4 °C) (Oral)   Resp 21   Ht 5' 7\" (1.702 m)   Wt 140 lb (63.5 kg)   SpO2 100%   BMI 21.93 kg/m²            PHYSICAL EXAM   GEN Awake female, sitting upright in bed in no apparent distress. Appears given age. EYES Pupils are equally round. No scleral erythema, discharge, or conjunctivitis. HENT Mucous membranes are moist. Oral pharynx without exudates, no evidence of thrush. NECK Supple, no apparent thyromegaly or masses. RESP Clear to auscultation, no wheezes, rales or rhonchi. Symmetric chest movement while on room air. CARDIO/VASC S1/S2 auscultated. Regular rate without appreciable murmurs, rubs, or gallops. No JVD or carotid bruits. Peripheral pulses equal bilaterally and palpable. No peripheral edema. GI Abdomen is soft without significant tenderness, masses, or guarding. Bowel sounds are normoactive. Rectal exam deferred.  No costovertebral angle tenderness. Normal appearing external genitalia. Barnard catheter is not present. HEME/LYMPH No palpable cervical lymphadenopathy and no hepatosplenomegaly. No petechiae or ecchymoses. MSK No gross joint deformities. SKIN Normal coloration, warm, dry; cath site c/d/i  NEURO Cranial nerves appear grossly intact, normal speech, no lateralizing weakness. PSYCH Awake, alert, oriented x 4. Affect appropriate. BMP/CBC  Recent Labs     01/10/22  0925      K 3.8      CO2 28   BUN 22   CREATININE 1.3*   WBC 4.0   HCT 39.7          IMAGING:  CXR:  No acute cardiopulmonary abnormality.     Discharge Time of 35 minutes    Electronically signed by Vonnie Avilez MD on 1/10/2022 at 3:08 PM

## 2022-01-11 ENCOUNTER — HOSPITAL ENCOUNTER (OUTPATIENT)
Age: 72
Discharge: HOME | End: 2022-01-11
Payer: MEDICARE

## 2022-01-11 LAB
ANION GAP SERPL CALCULATED.3IONS-SCNC: 12 MMOL/L (ref 4–16)
BUN BLDV-MCNC: 22 MG/DL (ref 6–23)
CALCIUM SERPL-MCNC: 9.6 MG/DL (ref 8.3–10.6)
CHLORIDE BLD-SCNC: 101 MMOL/L (ref 99–110)
CO2: 25 MMOL/L (ref 21–32)
CREAT SERPL-MCNC: 1.2 MG/DL (ref 0.6–1.1)
GFR AFRICAN AMERICAN: 53 ML/MIN/1.73M2
GFR NON-AFRICAN AMERICAN: 44 ML/MIN/1.73M2
GLUCOSE BLD-MCNC: 189 MG/DL (ref 70–99)
HCT VFR BLD CALC: 35.8 % (ref 37–47)
HEMOGLOBIN: 12.2 GM/DL (ref 12.5–16)
MCH RBC QN AUTO: 30.3 PG (ref 27–31)
MCHC RBC AUTO-ENTMCNC: 34.1 % (ref 32–36)
MCV RBC AUTO: 89.1 FL (ref 78–100)
PDW BLD-RTO: 12.1 % (ref 11.7–14.9)
PLATELET # BLD: 282 K/CU MM (ref 140–440)
PMV BLD AUTO: 10.5 FL (ref 7.5–11.1)
POTASSIUM SERPL-SCNC: 4.3 MMOL/L (ref 3.5–5.1)
RBC # BLD: 4.02 M/CU MM (ref 4.2–5.4)
SODIUM BLD-SCNC: 138 MMOL/L (ref 135–145)
WBC # BLD: 13 K/CU MM (ref 4–10.5)

## 2022-01-11 PROCEDURE — 85027 COMPLETE CBC AUTOMATED: CPT

## 2022-01-11 PROCEDURE — 80048 BASIC METABOLIC PNL TOTAL CA: CPT

## 2022-01-11 PROCEDURE — 36415 COLL VENOUS BLD VENIPUNCTURE: CPT

## 2022-01-12 LAB
EKG ATRIAL RATE: 71 BPM
EKG DIAGNOSIS: NORMAL
EKG P AXIS: 87 DEGREES
EKG P-R INTERVAL: 210 MS
EKG Q-T INTERVAL: 374 MS
EKG QRS DURATION: 80 MS
EKG QTC CALCULATION (BAZETT): 406 MS
EKG R AXIS: -42 DEGREES
EKG T AXIS: 38 DEGREES
EKG VENTRICULAR RATE: 71 BPM

## 2022-01-12 PROCEDURE — 93010 ELECTROCARDIOGRAM REPORT: CPT | Performed by: INTERNAL MEDICINE

## 2022-09-20 ENCOUNTER — APPOINTMENT (OUTPATIENT)
Dept: GENERAL RADIOLOGY | Age: 72
End: 2022-09-20
Payer: MEDICARE

## 2022-09-20 ENCOUNTER — HOSPITAL ENCOUNTER (OUTPATIENT)
Age: 72
Setting detail: OBSERVATION
Discharge: HOME | End: 2022-09-21
Attending: EMERGENCY MEDICINE | Admitting: INTERNAL MEDICINE
Payer: MEDICARE

## 2022-09-20 DIAGNOSIS — R07.9 CHEST PAIN, UNSPECIFIED TYPE: Primary | ICD-10-CM

## 2022-09-20 LAB
ALBUMIN SERPL-MCNC: 4.6 GM/DL (ref 3.4–5)
ALP BLD-CCNC: 83 IU/L (ref 40–129)
ALT SERPL-CCNC: 7 U/L (ref 10–40)
ANION GAP SERPL CALCULATED.3IONS-SCNC: 10 MMOL/L (ref 4–16)
AST SERPL-CCNC: 17 IU/L (ref 15–37)
BILIRUB SERPL-MCNC: 0.5 MG/DL (ref 0–1)
BUN BLDV-MCNC: 20 MG/DL (ref 6–23)
CALCIUM SERPL-MCNC: 9.6 MG/DL (ref 8.3–10.6)
CHLORIDE BLD-SCNC: 99 MMOL/L (ref 99–110)
CO2: 26 MMOL/L (ref 21–32)
CREAT SERPL-MCNC: 1.1 MG/DL (ref 0.6–1.1)
GFR AFRICAN AMERICAN: 59 ML/MIN/1.73M2
GFR NON-AFRICAN AMERICAN: 49 ML/MIN/1.73M2
GLUCOSE BLD-MCNC: 117 MG/DL (ref 70–99)
HCT VFR BLD CALC: 36.2 % (ref 37–47)
HEMOGLOBIN: 11.8 GM/DL (ref 12.5–16)
MCH RBC QN AUTO: 29.7 PG (ref 27–31)
MCHC RBC AUTO-ENTMCNC: 32.6 % (ref 32–36)
MCV RBC AUTO: 91.2 FL (ref 78–100)
PDW BLD-RTO: 13.6 % (ref 11.7–14.9)
PLATELET # BLD: 239 K/CU MM (ref 140–440)
PMV BLD AUTO: 10.2 FL (ref 7.5–11.1)
POTASSIUM SERPL-SCNC: 4 MMOL/L (ref 3.5–5.1)
RBC # BLD: 3.97 M/CU MM (ref 4.2–5.4)
SODIUM BLD-SCNC: 135 MMOL/L (ref 135–145)
TOTAL PROTEIN: 6.8 GM/DL (ref 6.4–8.2)
TROPONIN T: <0.01 NG/ML
TROPONIN T: <0.01 NG/ML
WBC # BLD: 7.2 K/CU MM (ref 4–10.5)

## 2022-09-20 PROCEDURE — 85027 COMPLETE CBC AUTOMATED: CPT

## 2022-09-20 PROCEDURE — 84443 ASSAY THYROID STIM HORMONE: CPT

## 2022-09-20 PROCEDURE — 6370000000 HC RX 637 (ALT 250 FOR IP): Performed by: STUDENT IN AN ORGANIZED HEALTH CARE EDUCATION/TRAINING PROGRAM

## 2022-09-20 PROCEDURE — G0378 HOSPITAL OBSERVATION PER HR: HCPCS

## 2022-09-20 PROCEDURE — 36415 COLL VENOUS BLD VENIPUNCTURE: CPT

## 2022-09-20 PROCEDURE — 93005 ELECTROCARDIOGRAM TRACING: CPT | Performed by: FAMILY MEDICINE

## 2022-09-20 PROCEDURE — 2580000003 HC RX 258: Performed by: INTERNAL MEDICINE

## 2022-09-20 PROCEDURE — 80053 COMPREHEN METABOLIC PANEL: CPT

## 2022-09-20 PROCEDURE — 6370000000 HC RX 637 (ALT 250 FOR IP): Performed by: EMERGENCY MEDICINE

## 2022-09-20 PROCEDURE — 84484 ASSAY OF TROPONIN QUANT: CPT

## 2022-09-20 PROCEDURE — 84439 ASSAY OF FREE THYROXINE: CPT

## 2022-09-20 PROCEDURE — 80061 LIPID PANEL: CPT

## 2022-09-20 PROCEDURE — 71046 X-RAY EXAM CHEST 2 VIEWS: CPT

## 2022-09-20 PROCEDURE — 94761 N-INVAS EAR/PLS OXIMETRY MLT: CPT

## 2022-09-20 PROCEDURE — 99285 EMERGENCY DEPT VISIT HI MDM: CPT

## 2022-09-20 PROCEDURE — 6360000002 HC RX W HCPCS: Performed by: STUDENT IN AN ORGANIZED HEALTH CARE EDUCATION/TRAINING PROGRAM

## 2022-09-20 RX ORDER — NITROGLYCERIN 0.4 MG/1
0.4 TABLET SUBLINGUAL EVERY 5 MIN PRN
Status: DISCONTINUED | OUTPATIENT
Start: 2022-09-20 | End: 2022-09-21 | Stop reason: HOSPADM

## 2022-09-20 RX ORDER — LOSARTAN POTASSIUM 100 MG/1
100 TABLET ORAL DAILY
Status: DISCONTINUED | OUTPATIENT
Start: 2022-09-20 | End: 2022-09-20

## 2022-09-20 RX ORDER — ASPIRIN 81 MG/1
81 TABLET, CHEWABLE ORAL DAILY
Status: DISCONTINUED | OUTPATIENT
Start: 2022-09-21 | End: 2022-09-21 | Stop reason: HOSPADM

## 2022-09-20 RX ORDER — ATORVASTATIN CALCIUM 40 MG/1
80 TABLET, FILM COATED ORAL NIGHTLY
Status: DISCONTINUED | OUTPATIENT
Start: 2022-09-20 | End: 2022-09-21

## 2022-09-20 RX ORDER — ONDANSETRON 4 MG/1
4 TABLET, ORALLY DISINTEGRATING ORAL EVERY 8 HOURS PRN
Status: DISCONTINUED | OUTPATIENT
Start: 2022-09-20 | End: 2022-09-21 | Stop reason: HOSPADM

## 2022-09-20 RX ORDER — NITROGLYCERIN 0.4 MG/1
0.4 TABLET SUBLINGUAL EVERY 5 MIN PRN
Status: DISCONTINUED | OUTPATIENT
Start: 2022-09-20 | End: 2022-09-20 | Stop reason: SDUPTHER

## 2022-09-20 RX ORDER — HYDROCHLOROTHIAZIDE 25 MG/1
25 TABLET ORAL DAILY
Status: DISCONTINUED | OUTPATIENT
Start: 2022-09-20 | End: 2022-09-20

## 2022-09-20 RX ORDER — SODIUM CHLORIDE 9 MG/ML
INJECTION, SOLUTION INTRAVENOUS PRN
Status: DISCONTINUED | OUTPATIENT
Start: 2022-09-20 | End: 2022-09-21 | Stop reason: HOSPADM

## 2022-09-20 RX ORDER — LOSARTAN POTASSIUM AND HYDROCHLOROTHIAZIDE 25; 100 MG/1; MG/1
1 TABLET ORAL DAILY
COMMUNITY
Start: 2022-08-24

## 2022-09-20 RX ORDER — CARVEDILOL 6.25 MG/1
6.25 TABLET ORAL 2 TIMES DAILY
Status: DISCONTINUED | OUTPATIENT
Start: 2022-09-20 | End: 2022-09-21 | Stop reason: HOSPADM

## 2022-09-20 RX ORDER — CARVEDILOL 6.25 MG/1
6.25 TABLET ORAL 2 TIMES DAILY
COMMUNITY
Start: 2022-09-19

## 2022-09-20 RX ORDER — POLYETHYLENE GLYCOL 3350 17 G/17G
17 POWDER, FOR SOLUTION ORAL DAILY PRN
Status: DISCONTINUED | OUTPATIENT
Start: 2022-09-20 | End: 2022-09-21 | Stop reason: HOSPADM

## 2022-09-20 RX ORDER — SODIUM CHLORIDE 0.9 % (FLUSH) 0.9 %
5-40 SYRINGE (ML) INJECTION EVERY 12 HOURS SCHEDULED
Status: DISCONTINUED | OUTPATIENT
Start: 2022-09-20 | End: 2022-09-21 | Stop reason: HOSPADM

## 2022-09-20 RX ORDER — EZETIMIBE 10 MG/1
10 TABLET ORAL DAILY
COMMUNITY
Start: 2022-09-19

## 2022-09-20 RX ORDER — ENOXAPARIN SODIUM 100 MG/ML
40 INJECTION SUBCUTANEOUS DAILY
Status: DISCONTINUED | OUTPATIENT
Start: 2022-09-20 | End: 2022-09-21 | Stop reason: HOSPADM

## 2022-09-20 RX ORDER — EZETIMIBE 10 MG/1
10 TABLET ORAL DAILY
Status: DISCONTINUED | OUTPATIENT
Start: 2022-09-20 | End: 2022-09-21 | Stop reason: HOSPADM

## 2022-09-20 RX ORDER — SODIUM CHLORIDE 0.9 % (FLUSH) 0.9 %
5-40 SYRINGE (ML) INJECTION PRN
Status: DISCONTINUED | OUTPATIENT
Start: 2022-09-20 | End: 2022-09-21 | Stop reason: HOSPADM

## 2022-09-20 RX ORDER — ACETAMINOPHEN 325 MG/1
650 TABLET ORAL EVERY 6 HOURS PRN
Status: DISCONTINUED | OUTPATIENT
Start: 2022-09-20 | End: 2022-09-21 | Stop reason: HOSPADM

## 2022-09-20 RX ORDER — LOSARTAN POTASSIUM AND HYDROCHLOROTHIAZIDE 25; 100 MG/1; MG/1
1 TABLET ORAL DAILY
Status: DISCONTINUED | OUTPATIENT
Start: 2022-09-20 | End: 2022-09-20 | Stop reason: RX

## 2022-09-20 RX ORDER — SODIUM CHLORIDE 9 MG/ML
INJECTION, SOLUTION INTRAVENOUS CONTINUOUS
Status: DISCONTINUED | OUTPATIENT
Start: 2022-09-20 | End: 2022-09-21

## 2022-09-20 RX ORDER — ONDANSETRON 2 MG/ML
4 INJECTION INTRAMUSCULAR; INTRAVENOUS EVERY 6 HOURS PRN
Status: DISCONTINUED | OUTPATIENT
Start: 2022-09-20 | End: 2022-09-21 | Stop reason: HOSPADM

## 2022-09-20 RX ADMIN — TICAGRELOR 90 MG: 90 TABLET ORAL at 20:25

## 2022-09-20 RX ADMIN — EZETIMIBE 10 MG: 10 TABLET ORAL at 22:36

## 2022-09-20 RX ADMIN — CARVEDILOL 6.25 MG: 6.25 TABLET, FILM COATED ORAL at 20:25

## 2022-09-20 RX ADMIN — NITROGLYCERIN 0.4 MG: 0.4 TABLET, ORALLY DISINTEGRATING SUBLINGUAL at 10:32

## 2022-09-20 RX ADMIN — ATORVASTATIN CALCIUM 80 MG: 40 TABLET, FILM COATED ORAL at 20:25

## 2022-09-20 RX ADMIN — SODIUM CHLORIDE: 9 INJECTION, SOLUTION INTRAVENOUS at 20:25

## 2022-09-20 RX ADMIN — ENOXAPARIN SODIUM 40 MG: 40 INJECTION SUBCUTANEOUS at 15:35

## 2022-09-20 ASSESSMENT — PAIN DESCRIPTION - LOCATION: LOCATION: CHEST

## 2022-09-20 ASSESSMENT — PAIN SCALES - GENERAL
PAINLEVEL_OUTOF10: 7
PAINLEVEL_OUTOF10: 9
PAINLEVEL_OUTOF10: 4

## 2022-09-20 ASSESSMENT — HEART SCORE: ECG: 0

## 2022-09-20 NOTE — CONSULTS
Dictated --[id number]  Watch for now  Will decide in am  Will review her cath  May need a cath    Electronically signed by David Espinosa MD on 9/20/22 at 7:08 PM EDT

## 2022-09-20 NOTE — H&P
V2.0  History and Physical      Name:  Toñito Kauffman /Age/Sex: 1950  [facility]67 y.o. female)   MRN & CSN:  [id number] & [id number] Encounter Date/Time: 2022 3:04 PM EDT   Location:  [address] [place] [address]-A PCP: Angus Richard MD       Hospital Day: 1    Assessment and Plan:   Toñito Kauffman is a 67 y.o. female with a pmh of hypertension, hyperlipidemia, coronary artery disease status post stents  who presents with Chest pain    Hospital Problems             Last Modified POA    * (Principal) Chest pain 2022 Yes       Chest pain rule out ACS: Heart score 5, history of CAD s/p stents EKG normal sinus rhythm left axis deviation, troponin X2 --> 0.010 relieved with nitroglycerin, continue nitroglycerin as needed along with morphine, aspirin, Brilinta and atorvastatin we obtain echocardiogram, continue telemetry, will consult cardiology for possible stress test  Chronic conditions hypertension, hyperlipidemia, CKD stage III we will continue home medication if no contraindications    Disposition:   Current Living situation: Home with   Expected Disposition: Home  Estimated D/C: 1 to 2 days    Diet ADULT DIET; Regular; No Added Salt (3-4 gm)   DVT Prophylaxis [x] Lovenox, []  Heparin, [] SCDs, [] Ambulation,  [] Eliquis, [] Xarelto, [] Coumadin   Code Status Full Code   Surrogate Decision Maker/ POA      History from:     patient    History of Present Illness:     Chief Complaint: Chest pain  Toñito Kauffman is a 67 y.o. female with pmh of hypertension, hyperlipidemia, CAD s/p stents who presents with left-sided chest pain. She was apparently asymptomatic until this morning she started having left-sided chest pain states it is about 7-9/10 radiating to left shoulder her pain improved associated with slight shortness of breath but denies any palpitations, headache, nausea, vomiting, abdominal pain, diarrhea.   In ED his vitals he is afebrile, not tachycardic, not tachypneic normal blood pressure  Troponin x2 normal       Review of Systems: Need 10 Elements   Review of Systems    Constitutional: No fever no chills  HEENT: No headache no dizziness  Cardiovascular: chest pain+ no palpitations  Respiratory: No cough shortness of breath+  Abdomen: No diarrhea, no nausea, no vomiting, no abdominal pain  Musculoskeletal: no swelling  Neuro: No numbness or tingling  Skin: No rash       Objective:   No intake or output data in the 24 hours ending 09/20/22 1504   Vitals:   Vitals:    09/20/22 1133 09/20/22 1202 09/20/22 1302 09/20/22 1415   BP: (!) 101/59 (!) 97/53 (!) 110/53 (!) 129/56   Pulse: 59 59 55 66   Resp: 15 12 17 18   Temp:    97.4 °F (36.3 °C)   TempSrc:    Oral   SpO2: 98% 99% 100% 100%   Weight:       Height:           Medications Prior to Admission     Prior to Admission medications    Medication Sig Start Date End Date Taking? Authorizing Provider   carvedilol (COREG) 6.25 MG tablet Take 6.25 mg by mouth 2 times daily 9/19/22   Historical Provider, MD   ezetimibe (ZETIA) 10 MG tablet Take 10 mg by mouth daily 9/19/22   Historical Provider, MD   losartan-hydroCHLOROthiazide (HYZAAR) 100-25 MG per tablet Take 1 tablet by mouth daily 8/24/22   Historical Provider, MD   atorvastatin (LIPITOR) 80 MG tablet Take 1 tablet by mouth nightly 1/10/22   Veronica Gresham MD   ticagrelor (BRILINTA) 90 MG TABS tablet Take 1 tablet by mouth 2 times daily 1/11/22 9/20/22  Veronica Gresham MD   aspirin 81 MG chewable tablet Take 1 tablet by mouth daily 3/17/19   Ben Harper MD   nitroGLYCERIN (NITROSTAT) 0.4 MG SL tablet up to max of 3 total doses. If no relief after 1 dose, call 911. 3/16/19   Ben Harper MD       Physical Exam: Need 8 Elements   Physical Exam     General: Afebrile, no distress  Eyes: EOMI  ENT: neck supple  Cardiovascular: S1-S2 normal no murmur  Respiratory: Clear to auscultation  Gastrointestinal: Soft, non tender  Genitourinary: no suprapubic tenderness  Musculoskeletal: No edema  Skin: warm, dry  Neuro: Alert. Oriented no focal deficit  Psych: Mood appropriate. Past Medical History:   PMHx   Past Medical History:   Diagnosis Date    Hyperlipidemia     Hypertension     \"on medication since 2015\"     PSHX:  has a past surgical history that includes Knee Arthroplasty (Right, 2015); Abdominoplasty (10+ yrs ago); Breast reduction surgery (2012); Hysterectomy (age 44); Cholecystectomy, laparoscopic (05/10/2017); Coronary angioplasty with stent; and Thyroid surgery. Allergies: Allergies   Allergen Reactions    Dye [Iodides] Hives     \"Broke out in hives and got real red and hot. \"    Ibuprofen Other (See Comments)     \"Slows heart rate way down\"     Fam HX:  family history includes Heart Disease in her brother; No Known Problems in her father; Stroke in her mother.   Soc HX:   Social History     Socioeconomic History    Marital status:      Spouse name: None    Number of children: None    Years of education: None    Highest education level: None   Tobacco Use    Smoking status: Never    Smokeless tobacco: Never   Substance and Sexual Activity    Alcohol use: Yes     Comment: Socially    Drug use: No    Sexual activity: Never       Medications:   Medications:    [START ON 9/21/2022] aspirin  81 mg Oral Daily    atorvastatin  80 mg Oral Nightly    carvedilol  6.25 mg Oral BID    ezetimibe  10 mg Oral Daily    ticagrelor  90 mg Oral BID    sodium chloride flush  5-40 mL IntraVENous 2 times per day    enoxaparin  40 mg SubCUTAneous Daily    losartan  100 mg Oral Daily    And    hydroCHLOROthiazide  25 mg Oral Daily      Infusions:    sodium chloride       PRN Meds: nitroGLYCERIN, 0.4 mg, Q5 Min PRN  sodium chloride flush, 5-40 mL, PRN  sodium chloride, , PRN  ondansetron, 4 mg, Q8H PRN   Or  ondansetron, 4 mg, Q6H PRN  polyethylene glycol, 17 g, Daily PRN  acetaminophen, 650 mg, Q6H PRN   Or  acetaminophen, 650 mg, Q6H PRN        Labs      CBC:   Recent Labs     09/20/22  0905   WBC 7.2   HGB 11.8*        BMP: Recent Labs     09/20/22  0905      K 4.0   CL 99   CO2 26   BUN 20   CREATININE 1.1   GLUCOSE 117*     Hepatic:   Recent Labs     09/20/22  0905   AST 17   ALT 7*   BILITOT 0.5   ALKPHOS 83     Lipids:   Lab Results   Component Value Date/Time    CHOL 133 05/18/2021 04:27 AM    HDL 41 05/18/2021 04:27 AM    TRIG 156 05/18/2021 04:27 AM     Hemoglobin A1C: No results found for: LABA1C  TSH: No results found for: TSH  Troponin:   Lab Results   Component Value Date/Time    TROPONINT <0.010 09/20/2022 12:05 PM    TROPONINT <0.010 09/20/2022 09:05 AM    TROPONINT <0.010 01/10/2022 09:25 AM     Lactic Acid: No results for input(s): LACTA in the last 72 hours. BNP: No results for input(s): PROBNP in the last 72 hours. UA:  Lab Results   Component Value Date/Time    NITRU NEGATIVE 05/14/2017 04:32 AM    COLORU DARK YELLOW 05/14/2017 04:32 AM    WBCUA 3 05/14/2017 04:32 AM    RBCUA NONE SEEN 05/14/2017 04:32 AM    MUCUS 1+ 07/23/2015 09:15 AM    YEAST OCCASIONAL 05/14/2017 04:32 AM    BACTERIA RARE 05/14/2017 04:32 AM    CLARITYU SLIGHTLY CLOUDY 05/14/2017 04:32 AM    SPECGRAV 1.015 05/14/2017 04:32 AM    LEUKOCYTESUR NEGATIVE 05/14/2017 04:32 AM    UROBILINOGEN 1.0 05/14/2017 04:32 AM    BILIRUBINUR NEGATIVE 05/14/2017 04:32 AM    BLOODU NEGATIVE 05/14/2017 04:32 AM    KETUA SMALL 05/14/2017 04:32 AM     Urine Cultures: No results found for: Cyndee Martínez  Blood Cultures: No results found for: BC  No results found for: BLOODCULT2  Organism: No results found for: ORG    Imaging/Diagnostics Last 24 Hours   XR CHEST (2 VW)    Result Date: 9/20/2022  EXAMINATION: TWO XRAY VIEWS OF THE CHEST 9/20/2022 10:21 am COMPARISON: 01/10/2022 HISTORY: ORDERING SYSTEM PROVIDED HISTORY: Chest Pain TECHNOLOGIST PROVIDED HISTORY: Reason for exam:->Chest Pain Reason for Exam: chest pain FINDINGS: The cardiomediastinal silhouette is unchanged. Atherosclerosis of the aorta is again noted. Sequela of prior granulomatous disease.   No new focal consolidation, pneumothorax, or pleural effusion. Osseous structures are diffusely demineralized and grossly unchanged. No acute cardiopulmonary findings.        Personally reviewed Lab Studies, Imaging, and discussed case with patient and her     Electronically signed by Shayne Gresham MD on 9/20/2022 at 3:05 PM

## 2022-09-20 NOTE — ED NOTES
Medication History  [facility]    Patient Name: Zahida Allen 1950     Medication history has been completed by: Sebastian Szymanski CPhT    Source(s) of information: patient and insurance claims     Primary Care Physician: Jorge Alberto Nowak MD     Pharmacy: [facility]    Allergies as of 09/20/2022 - Fully Reviewed 09/20/2022   Allergen Reaction Noted    Dye [iodides] Hives 07/23/2015    Ibuprofen Other (See Comments) 07/23/2015        Prior to Admission medications    Medication Sig Start Date End Date Taking? Authorizing Provider   carvedilol (COREG) 6.25 MG tablet Take 6.25 mg by mouth 2 times daily 9/19/22   Historical Provider, MD   ezetimibe (ZETIA) 10 MG tablet Take 10 mg by mouth daily 9/19/22   Historical Provider, MD   losartan-hydroCHLOROthiazide (HYZAAR) 100-25 MG per tablet Take 1 tablet by mouth daily 8/24/22   Historical Provider, MD   atorvastatin (LIPITOR) 80 MG tablet Take 1 tablet by mouth nightly 1/10/22   Malcom Carl MD   ticagrelor (BRILINTA) 90 MG TABS tablet Take 1 tablet by mouth 2 times daily 1/11/22   Malcom Carl MD   aspirin 81 MG chewable tablet Take 1 tablet by mouth daily 3/17/19   Ben Harper MD   nitroGLYCERIN (NITROSTAT) 0.4 MG SL tablet up to max of 3 total doses. If no relief after 1 dose, call 911. 3/16/19   Ben Harper MD     Medications added or changed (ex. new medication, dosage change, interval change, formulation change):  Metoprolol ER changed to Carvedilol   Ezetimibe (added)    Medications removed from list (include reason, ex. noncompliance, medication cost, therapy complete etc.):   Fexofenadine no longer taking  Meloxicam no longer taking  Ranolazine no longer taking    Comments:  Medication list reviewed with patient and insurance claims verified. Patient reports she only took an aspirin today.     To my knowledge the above medication history is accurate as of 9/20/2022 1:09 PM.   Sebastian Szymanski CPhT   9/20/2022 1:09 PM

## 2022-09-20 NOTE — ED NOTES
[id number] paged hospitalist     Juan A Moctezuma  09/20/22 [phone] hospitalist returned call      Juan A Moctezuma  09/20/22 [id number]

## 2022-09-20 NOTE — CONSULTS
[address] [address] [facility], [address] [facility]                                  CONSULTATION    PATIENT NAME: Adeline Fuchs                     :        1950  MED REC NO:   [id number]                          ROOM:       4002  ACCOUNT NO:   [de-identified]                           ADMIT DATE: 2022  PROVIDER:     Farideh Robert MD    CONSULT DATE:  2022    INDICATIONS:  Chest pain. HISTORY OF PRESENT ILLNESS:  This is a 26-year-old female patient with a  history of coronary artery disease present. She came to the hospital  with chest pain. She said this morning, she woke up this morning having  chest pain and chest pressure. The pain radiation to the left arm  present and she is having some discomfort _____. She has been having  chest pain with exertion present. No fever and no chills. No cough  production. No other  or GI complaints. No syncopal episode is  present. The patient has a history of coronary artery disease present. She had  heart catheterization done and angioplasty done. She has had an echo  done back in 2022. The echo showed that LV function was  preserved at that time. Her past medical history is coronary disease. She has had heart catheterization done back in 2022. At that  time, her left main was patent. LAD distal in-stent stenosis noted. There was a balloon angioplasty performed. Circ was patent. OMs were  patent. RCA with mild disease noted. The patient was treated  medically. PAST MEDICAL HISTORY:  Coronary artery disease, status post angioplasty  done of the LAD and also the circ. Hypertension and hyperlipidemia  present. No stroke, no seizures, and no diabetes present. PAST SURGICAL HISTORY:  Angioplasty to LAD and circumflex artery. SOCIAL HISTORY:  She does not smoke. She does not drink.     MEDICATIONS AT HOME:  She is on Brilinta, aspirin, Coreg, Zetia, Hyzaar,  and Lipitor. ALLERGIES:  IBUPROFEN and DYE. PHYSICAL EXAMINATION:  GENERAL:  The patient is awake, alert, and answering questions, not in  acute distress. VITAL SIGNS:  Temperature is afebrile. Pulse is 64. Blood pressure is  106/80. HEENT:  Head is atraumatic. Pupils are equal and reactive. CHEST:  Equal expansion. LUNGS:  Clear to auscultation. No wheezing or rhonchi present. HEART:  Regular rate and rhythm. ABDOMEN:  Soft and nontender. Bowel sounds are present. No  hepatosplenomegaly or guarding appreciated. EXTREMITIES:  No cyanosis or clubbing noted. NEUROLOGIC:  Cranial nerves are grossly intact. LABORATORY DATA:  EKG was sinus rhythm with no acute ST-T changes noted. IMPRESSION AND PLAN:  The patient with known history of coronary artery  disease. She comes in having chest pain and describes the pain as  radiating to the left arm. She is noted to have coronary artery disease  and we will rule out the ACS and then we will make further  recommendations and I will review her heart catheterization also.         Lizbeth Mari MD    D: 09/20/2022 19:07:50       T: 09/20/2022 19:39:47     [email or web address]  Job#: [id number]     Doc#: [id number]    CC:

## 2022-09-20 NOTE — CARE COORDINATION
Chart reviewed. Pt from home, independent PTA. Pt has PCP and insurance. No discharge needs identified at this time. CM available should any new needs arise.

## 2022-09-20 NOTE — ED NOTES
Blood redrawn and sent to lab,lab called to state they found the blood first set of blood tubes     Edyta Cantu RN  09/20/22 [id number] [address]  09/20/22 1010

## 2022-09-20 NOTE — ED NOTES
Lab called to see why the blood was not running,lab states they never received it     Jef Lehman, GIANCARLO  09/20/22 1018

## 2022-09-20 NOTE — ED PROVIDER NOTES
[address] ENCOUNTER      Pt Name: Lamberto Montes  MRN: [id number]  [place] 1950  Date of evaluation: 9/20/2022  Provider: Pipo Quijano MD    CHIEF COMPLAINT       Chief Complaint   Patient presents with    Chest Pain         HISTORY OF PRESENT ILLNESS   (Location/Symptom, Timing/Onset, Context/Setting, Quality, Duration, Modifying Factors, Severity)  Note limiting factors. Lamberto Montes is a 67 y.o. female who presents to the emergency department for chest pain. HPI    Nursing Notes were reviewed. This is a 48-year-old woman who comes the emergency department due to worsening left-sided substernal chest pain radiating down her left arm. The patient says she is been having episodic chest pain for the past 2 to 3 days, however this morning the pain became significantly worse. Patient describes pain and pressure radiating towards the left arm into the left neck. She denies nausea or vomiting. Denies recent fevers. Denies trauma. She denies shortness of breath. She denies cough. She denies abdominal pain. She denies dysuria or frequency. Patient had a cardiac catheterization done in January 2022 with stent placement. REVIEW OF SYSTEMS    (2-9 systems for level 4, 10 or more for level 5)     Review of Systems    10 systems were reviewed with this patient. All were negative with exception of those noted in the history of present illness above.         PAST MEDICAL HISTORY     Past Medical History:   Diagnosis Date    Hyperlipidemia     Hypertension     \"on medication since 2015\"         SURGICAL HISTORY       Past Surgical History:   Procedure Laterality Date    ABDOMINOPLASTY  10+ yrs ago    \"tummy tuck\"    BREAST REDUCTION SURGERY  2012    [facility]    CHOLECYSTECTOMY, LAPAROSCOPIC  05/10/2017    CORONARY ANGIOPLASTY WITH STENT PLACEMENT      HYSTERECTOMY (CERVIX STATUS UNKNOWN)  age 44    left both ovaries\"    KNEE ARTHROPLASTY Right 2015    THYROID SURGERY      right side of thyroid removed         CURRENT MEDICATIONS       Previous Medications    ASPIRIN 81 MG CHEWABLE TABLET    Take 1 tablet by mouth daily    ATORVASTATIN (LIPITOR) 80 MG TABLET    Take 1 tablet by mouth nightly    FEXOFENADINE HCL (ALLEGRA PO)    Take by mouth    LOSARTAN-HYDROCHLOROTHIAZIDE (HYZAAR) 50-12.5 MG PER TABLET    Take 2 tablets by mouth nightly    MELOXICAM (MOBIC) 15 MG TABLET    Take 15 mg by mouth daily as needed for Pain    METOPROLOL SUCCINATE (TOPROL XL) 25 MG EXTENDED RELEASE TABLET    Take 1 tablet by mouth nightly    NITROGLYCERIN (NITROSTAT) 0.4 MG SL TABLET    up to max of 3 total doses. If no relief after 1 dose, call 911. RANOLAZINE (RANEXA) 500 MG EXTENDED RELEASE TABLET    Take 1 tablet by mouth 2 times daily    TICAGRELOR (BRILINTA) 90 MG TABS TABLET    Take 1 tablet by mouth 2 times daily       ALLERGIES     Dye [iodides] and Ibuprofen    FAMILY HISTORY       Family History   Problem Relation Age of Onset    Stroke Mother     No Known Problems Father     Heart Disease Brother           SOCIAL HISTORY       Social History     Socioeconomic History    Marital status:      Spouse name: None    Number of children: None    Years of education: None    Highest education level: None   Tobacco Use    Smoking status: Never    Smokeless tobacco: Never   Substance and Sexual Activity    Alcohol use: Yes     Comment: Socially    Drug use: No    Sexual activity: Never       SCREENINGS         [place] Coma Scale  Eye Opening: Spontaneous  Best Verbal Response: Oriented  Best Motor Response: Obeys commands  Macie Coma Scale Score: 15     Heart Score for chest pain patients  History:  Moderately Suspicious  ECG: Normal  Patient Age: > 65 years  *Risk factors for Atherosclerotic disease: Hypercholesterolemia, Hypertension, Coronary Artery Disease  Risk Factors: > 3 Risk factors or history of atherosclerotic disease*  Troponin: < 1X normal limit  Heart Score Total: 5               CIWA Assessment  BP: (!) 97/53  Heart Rate: 59                 PHYSICAL EXAM    (up to 7 for level 4, 8 or more for level 5)     ED Triage Vitals [09/20/22 0900]   BP Temp Temp src Heart Rate Resp SpO2 Height Weight   (!) 140/63 97.8 °F (36.6 °C) -- 70 16 100 % 5' 7\" (1.702 m) 135 lb (61.2 kg)       Physical Exam  Vitals and nursing note reviewed. Constitutional:       General: She is not in acute distress. Appearance: Normal appearance. She is not ill-appearing, toxic-appearing or diaphoretic. HENT:      Head: Normocephalic and atraumatic. Nose: Nose normal.   Eyes:      Extraocular Movements: Extraocular movements intact. Cardiovascular:      Rate and Rhythm: Normal rate and regular rhythm. Pulses: Normal pulses. Heart sounds: Normal heart sounds. Pulmonary:      Effort: Pulmonary effort is normal.      Breath sounds: Normal breath sounds. Abdominal:      Palpations: Abdomen is soft. Tenderness: There is no abdominal tenderness. Musculoskeletal:         General: Normal range of motion. Cervical back: Normal range of motion. Skin:     General: Skin is warm and dry. Capillary Refill: Capillary refill takes less than 2 seconds. Neurological:      General: No focal deficit present. Mental Status: She is alert and oriented to person, place, and time. Psychiatric:         Mood and Affect: Mood normal.         Behavior: Behavior normal.       DIAGNOSTIC RESULTS     EKG: All EKG's are interpreted by the Emergency Department Physician who either signs or Co-signs this chart in the absence of a cardiologist.    \Normal sinus rhythm. Ventricular to 68. NH is 182. QRS is 82. QTc is 397. There are no abnormal ST elevations or depressions. There is no ectopy.   No significant change from prior EKG from January 2022 per    RADIOLOGY:   Non-plain film images such as CT, Ultrasound and MRI are read by the radiologist. Andrew Curtis radiographic images are visualized and preliminarily interpreted by the emergency physician with the below findings:    Interpretation per the Radiologist below, if available at the time of this note:    XR CHEST (2 VW)   Preliminary Result   No acute cardiopulmonary findings. ED BEDSIDE ULTRASOUND:   Performed by ED Physician - none    LABS:  Labs Reviewed   CBC - Abnormal; Notable for the following components:       Result Value    RBC 3.97 (*)     Hemoglobin 11.8 (*)     Hematocrit 36.2 (*)     All other components within normal limits   COMPREHENSIVE METABOLIC PANEL - Abnormal; Notable for the following components:    Glucose 117 (*)     ALT 7 (*)     GFR Non- 49 (*)     GFR  59 (*)     All other components within normal limits   TROPONIN   TROPONIN       All other labs were within normal range or not returned as of this dictation. EMERGENCY DEPARTMENT COURSE and DIFFERENTIAL DIAGNOSIS/MDM:   Vitals:    Vitals:    09/20/22 1000 09/20/22 1034 09/20/22 1133 09/20/22 1202   BP: (!) 120/58 124/63 (!) 101/59 (!) 97/53   Pulse: 61 66 59 59   Resp: 22 20 15 12   Temp:       SpO2: 100% 100% 98% 99%   Weight:       Height:           MDM  Primary concern this patient is acute coronary syndrome. Additional concerns are metabolic derangement. Patient will be evaluated for ACS, and will likely require admission due to an elevated heart score and significant ACS risk. REASSESSMENT      Patient received significant resolution of her symptoms with sublingual nitroglycerin. Her heart score is 5. Representing a significant risk for ACS. The patient will require admission to the hospital for evaluation by medicine and cardiology. She was last evaluated by cardiology 6 months ago and will require inpatient monitoring.     CONSULTS:  IP CONSULT TO HOSPITALIST    PROCEDURES:  Unless otherwise noted below, none     Procedures      FINAL IMPRESSION      1. Chest pain, unspecified type          DISPOSITION/PLAN   DISPOSITION        PATIENT REFERRED TO:  No follow-up provider specified. DISCHARGE MEDICATIONS:  New Prescriptions    No medications on file     Controlled Substances Monitoring:     No flowsheet data found.     (Please note that portions of this note were completed with a voice recognition program.  Efforts were made to edit the dictations but occasionally words are mis-transcribed.)    Francy Hines MD (electronically signed)  Attending Emergency Physician           Francy Hines MD  09/20/22 0

## 2022-09-21 VITALS
HEART RATE: 73 BPM | DIASTOLIC BLOOD PRESSURE: 53 MMHG | RESPIRATION RATE: 16 BRPM | TEMPERATURE: 97.8 F | SYSTOLIC BLOOD PRESSURE: 123 MMHG | OXYGEN SATURATION: 97 % | BODY MASS INDEX: 22.84 KG/M2 | WEIGHT: 145.5 LBS | HEIGHT: 67 IN

## 2022-09-21 LAB
ACTIVATED CLOTTING TIME, LOW RANGE: 148 SEC
ACTIVATED CLOTTING TIME, LOW RANGE: 229 SEC
ANION GAP SERPL CALCULATED.3IONS-SCNC: 8 MMOL/L (ref 4–16)
BASOPHILS ABSOLUTE: 0 K/CU MM
BASOPHILS RELATIVE PERCENT: 0.6 % (ref 0–1)
BUN BLDV-MCNC: 20 MG/DL (ref 6–23)
CALCIUM SERPL-MCNC: 8.7 MG/DL (ref 8.3–10.6)
CHLORIDE BLD-SCNC: 105 MMOL/L (ref 99–110)
CHOLESTEROL: 104 MG/DL
CO2: 25 MMOL/L (ref 21–32)
CREAT SERPL-MCNC: 1.1 MG/DL (ref 0.6–1.1)
DIFFERENTIAL TYPE: ABNORMAL
EKG ATRIAL RATE: 68 BPM
EKG DIAGNOSIS: NORMAL
EKG P AXIS: 58 DEGREES
EKG P-R INTERVAL: 182 MS
EKG Q-T INTERVAL: 374 MS
EKG QRS DURATION: 82 MS
EKG QTC CALCULATION (BAZETT): 397 MS
EKG R AXIS: -43 DEGREES
EKG T AXIS: 34 DEGREES
EKG VENTRICULAR RATE: 68 BPM
EOSINOPHILS ABSOLUTE: 0.2 K/CU MM
EOSINOPHILS RELATIVE PERCENT: 3.1 % (ref 0–3)
GFR AFRICAN AMERICAN: 59 ML/MIN/1.73M2
GFR NON-AFRICAN AMERICAN: 49 ML/MIN/1.73M2
GLUCOSE BLD-MCNC: 94 MG/DL (ref 70–99)
HCT VFR BLD CALC: 32.8 % (ref 37–47)
HDLC SERPL-MCNC: 42 MG/DL
HEMOGLOBIN: 10.2 GM/DL (ref 12.5–16)
IMMATURE NEUTROPHIL %: 0.4 % (ref 0–0.43)
LDL CHOLESTEROL CALCULATED: 35 MG/DL
LYMPHOCYTES ABSOLUTE: 1.1 K/CU MM
LYMPHOCYTES RELATIVE PERCENT: 23.5 % (ref 24–44)
MCH RBC QN AUTO: 29.5 PG (ref 27–31)
MCHC RBC AUTO-ENTMCNC: 31.1 % (ref 32–36)
MCV RBC AUTO: 94.8 FL (ref 78–100)
MONOCYTES ABSOLUTE: 0.5 K/CU MM
MONOCYTES RELATIVE PERCENT: 10.9 % (ref 0–4)
NUCLEATED RBC %: 0 %
PDW BLD-RTO: 13.6 % (ref 11.7–14.9)
PLATELET # BLD: 186 K/CU MM (ref 140–440)
PMV BLD AUTO: 10.2 FL (ref 7.5–11.1)
POTASSIUM SERPL-SCNC: 4.1 MMOL/L (ref 3.5–5.1)
RBC # BLD: 3.46 M/CU MM (ref 4.2–5.4)
SEGMENTED NEUTROPHILS ABSOLUTE COUNT: 3 K/CU MM
SEGMENTED NEUTROPHILS RELATIVE PERCENT: 61.5 % (ref 36–66)
SODIUM BLD-SCNC: 138 MMOL/L (ref 135–145)
T4 FREE: 1.19 NG/DL (ref 0.9–1.8)
TOTAL IMMATURE NEUTOROPHIL: 0.02 K/CU MM
TOTAL NUCLEATED RBC: 0 K/CU MM
TRIGL SERPL-MCNC: 136 MG/DL
TROPONIN T: <0.01 NG/ML
TSH HIGH SENSITIVITY: 3.43 UIU/ML (ref 0.27–4.2)
WBC # BLD: 4.9 K/CU MM (ref 4–10.5)

## 2022-09-21 PROCEDURE — 2500000003 HC RX 250 WO HCPCS

## 2022-09-21 PROCEDURE — 99152 MOD SED SAME PHYS/QHP 5/>YRS: CPT

## 2022-09-21 PROCEDURE — 6360000002 HC RX W HCPCS

## 2022-09-21 PROCEDURE — 99153 MOD SED SAME PHYS/QHP EA: CPT

## 2022-09-21 PROCEDURE — 85347 COAGULATION TIME ACTIVATED: CPT

## 2022-09-21 PROCEDURE — 80048 BASIC METABOLIC PNL TOTAL CA: CPT

## 2022-09-21 PROCEDURE — C1894 INTRO/SHEATH, NON-LASER: HCPCS

## 2022-09-21 PROCEDURE — 36415 COLL VENOUS BLD VENIPUNCTURE: CPT

## 2022-09-21 PROCEDURE — G0378 HOSPITAL OBSERVATION PER HR: HCPCS

## 2022-09-21 PROCEDURE — 85025 COMPLETE CBC W/AUTO DIFF WBC: CPT

## 2022-09-21 PROCEDURE — 2709999900 HC NON-CHARGEABLE SUPPLY

## 2022-09-21 PROCEDURE — 6360000004 HC RX CONTRAST MEDICATION

## 2022-09-21 PROCEDURE — 93458 L HRT ARTERY/VENTRICLE ANGIO: CPT

## 2022-09-21 PROCEDURE — 93010 ELECTROCARDIOGRAM REPORT: CPT | Performed by: INTERNAL MEDICINE

## 2022-09-21 PROCEDURE — 93308 TTE F-UP OR LMTD: CPT

## 2022-09-21 PROCEDURE — 94761 N-INVAS EAR/PLS OXIMETRY MLT: CPT

## 2022-09-21 PROCEDURE — 6370000000 HC RX 637 (ALT 250 FOR IP): Performed by: STUDENT IN AN ORGANIZED HEALTH CARE EDUCATION/TRAINING PROGRAM

## 2022-09-21 RX ORDER — SODIUM CHLORIDE 0.9 % (FLUSH) 0.9 %
5-40 SYRINGE (ML) INJECTION EVERY 12 HOURS SCHEDULED
Status: DISCONTINUED | OUTPATIENT
Start: 2022-09-21 | End: 2022-09-21 | Stop reason: HOSPADM

## 2022-09-21 RX ORDER — MORPHINE SULFATE 2 MG/ML
1 INJECTION, SOLUTION INTRAMUSCULAR; INTRAVENOUS
Status: DISCONTINUED | OUTPATIENT
Start: 2022-09-21 | End: 2022-09-21 | Stop reason: HOSPADM

## 2022-09-21 RX ORDER — ATROPINE SULFATE 0.4 MG/ML
0.5 AMPUL (ML) INJECTION
Status: DISCONTINUED | OUTPATIENT
Start: 2022-09-21 | End: 2022-09-21 | Stop reason: HOSPADM

## 2022-09-21 RX ORDER — SODIUM CHLORIDE 9 MG/ML
INJECTION, SOLUTION INTRAVENOUS CONTINUOUS
Status: DISCONTINUED | OUTPATIENT
Start: 2022-09-21 | End: 2022-09-21 | Stop reason: HOSPADM

## 2022-09-21 RX ORDER — SODIUM CHLORIDE 9 MG/ML
INJECTION, SOLUTION INTRAVENOUS PRN
Status: DISCONTINUED | OUTPATIENT
Start: 2022-09-21 | End: 2022-09-21 | Stop reason: HOSPADM

## 2022-09-21 RX ORDER — ATORVASTATIN CALCIUM 40 MG/1
40 TABLET, FILM COATED ORAL NIGHTLY
Status: DISCONTINUED | OUTPATIENT
Start: 2022-09-21 | End: 2022-09-21 | Stop reason: HOSPADM

## 2022-09-21 RX ORDER — SODIUM CHLORIDE 0.9 % (FLUSH) 0.9 %
5-40 SYRINGE (ML) INJECTION PRN
Status: DISCONTINUED | OUTPATIENT
Start: 2022-09-21 | End: 2022-09-21 | Stop reason: HOSPADM

## 2022-09-21 RX ORDER — ACETAMINOPHEN 325 MG/1
650 TABLET ORAL EVERY 4 HOURS PRN
Status: DISCONTINUED | OUTPATIENT
Start: 2022-09-21 | End: 2022-09-21 | Stop reason: HOSPADM

## 2022-09-21 RX ADMIN — CARVEDILOL 6.25 MG: 6.25 TABLET, FILM COATED ORAL at 08:35

## 2022-09-21 RX ADMIN — ASPIRIN 81 MG CHEWABLE TABLET 81 MG: 81 TABLET CHEWABLE at 08:28

## 2022-09-21 RX ADMIN — TICAGRELOR 90 MG: 90 TABLET ORAL at 08:28

## 2022-09-21 NOTE — PROCEDURES
performed. The  left coronary angiogram revealed the left main is patent. It bifurcates  into LAD and circumflex artery. Circ is a medium-sized vessel. It  gives off a medium-sized OM branch and OM has a stent present, which is  widely patent. Otherwise, mild disease noted in the circ and the OM  branch. LAD is a medium-sized vessel. It reaches and wraps at the  apex. LAD has a stent present in the mid segment. Very minimal  in-stent stenosis noted. BROOKE-3 was noted. It gives off a large D1  branch. Mild disease is present. IMPRESSION:  1. Left main is patent. 2.  LAD mid stents are patent. Very minimal in-stent stenosis is  present. 3.  Circ is patent. OM is distended widely patent. 4.  Right coronary artery has a mild disease noted. 5.  EDP is around 15 mmHg present. The patient has patent LAD and OM stent present. Continue medical treatment. The patient tolerated the procedure well. No complications noted.     Blood loss Harper Dancer, MD    D: 09/21/2022 10:56:52       T: 09/21/2022 11:48:31     [email or web address]  Job#: [id number]     Doc#: [id number]    CC:

## 2022-09-21 NOTE — PROGRESS NOTES
4fr right femoral arterial sheath removed. Manual pressure applied for 10mins by Pema Aguilera. RN, Hemostasis achieved, sterile dressing applied. Site is free of any bleeding or hematoma. Rt radial tr band removed, bleeding noted. Manual pressure applied for 5 mins. Sterile dressing applied. Site is free of hematoma. Rt radial armboard in place.

## 2022-09-21 NOTE — PLAN OF CARE
Problem: Discharge Planning  Goal: Discharge to home or other facility with appropriate resources  Outcome: Completed  Flowsheets (Taken 9/21/2022 1607)  Discharge to home or other facility with appropriate resources:   Identify barriers to discharge with patient and caregiver   Arrange for needed discharge resources and transportation as appropriate   Identify discharge learning needs (meds, wound care, etc)   Arrange for interpreters to assist at discharge as needed   Refer to discharge planning if patient needs post-hospital services based on physician order or complex needs related to functional status, cognitive ability or social support system     Problem: Pain  Goal: Verbalizes/displays adequate comfort level or baseline comfort level  Outcome: Completed     Problem: Safety - Adult  Goal: Free from fall injury  Outcome: Completed     Problem: Nutrition Deficit:  Goal: Optimize nutritional status  Outcome: Completed

## 2022-09-21 NOTE — BRIEF OP NOTE
Brief Postoperative Note      Patient: Damien Kauffman  YOB: 1950  MRN: [id number]    Date of Procedure: 9/21/22    Pre-Op Diagnosis: chest pain and CADF    Post-Op Diagnosis: Same       Estimated Blood Loss (mL): Minimal    Complications: None    Findings: DICTATED -  LEFT MAIN PATENT  LAD MID AND DISTAL STENT PATENT  LCX MILD DX  OM STENT PATENT  RCA MILD DX  LVEDP 15  MEDICAL TREATMENT  NO COMPLICATIONS  RIGHT RADIAL AND RIGHT FEMORAL APPROACH      Electronically signed by Jamey Barboza MD on 9/21/2022 at 10:51 AM

## 2022-09-21 NOTE — PROGRESS NOTES
Daily Progress Note  Subjective:    Pt. Awake, alert and feeling ok  HR stable, NSR, BP stable  No CP this am, SOB stable per pt. Attending Note:    Patient is awake alert--having chest pain  History of coronary disease-angioplasty of LAD in the past-restenosis also--plan for heart cath today patient agreed to proceed with heart cath  Hypertension given medications  Echo is pending-    Impression and Plan:     Chest pain    Severe 9/10 chest pressure yesterday morning that lasted about an hour and then resolved with nitro    Hx of CAD s/p multiple PCI in the past- most recent was in-stent stenosis 1/2022    She states symptoms feel fairly similar to what she has had before    Trop neg.- no ACS but concern for unstable angina    Echo ordered as well this am    D/t significant RF's and typical symptoms rec. [facility] during admission- discussed with pt. Including risk vs. Benefit and she is agreeable to proceed. Consent signed and placed in soft chart with RN as witness    Will follow  Further recs after [facility]    Most Recent Echo  1/10/22   Summary   This is a limited echocardiogram.   Left ventricular systolic function is normal.   Ejection fraction is visually estimated at 55-60%. No significant valvular disease noted. No evidence of any pericardial effusion. Most Recent Heart Cath  1/10/22  IMPRESSION:  1. Left main is patent. 2.  LAD mid to distal segment had in-stent stenosis of 80-90% present. 3.  Circ and OM stent is patent. 4.  RCA has mild disease noted. 5.  EDP was around 10-15 mmHg present. PAST MEDICAL HISTORY:  Coronary artery disease, status post angioplasty  done of the LAD and also the circ. Hypertension and hyperlipidemia  present. No stroke, no seizures, and no diabetes present. PAST SURGICAL HISTORY:  Angioplasty to LAD and circumflex artery. SOCIAL HISTORY:  She does not smoke. She does not drink.      MEDICATIONS AT HOME:  She is on Brilinta, aspirin, Coreg, Zetia, Hyzaar,  and Lipitor. ALLERGIES:  IBUPROFEN and DYE. Objective:   BP (!) 143/61   Pulse 73   Temp 97.9 °F (36.6 °C)   Resp 16   Ht 5' 7\" (1.702 m)   Wt 145 lb 8.1 oz (66 kg)   SpO2 98%   BMI 22.79 kg/m²   No intake or output data in the 24 hours ending 09/21/22 0809    Medications:   Scheduled Meds:   aspirin  81 mg Oral Daily    atorvastatin  80 mg Oral Nightly    carvedilol  6.25 mg Oral BID    ezetimibe  10 mg Oral Daily    ticagrelor  90 mg Oral BID    sodium chloride flush  5-40 mL IntraVENous 2 times per day    enoxaparin  40 mg SubCUTAneous Daily      Infusions:   sodium chloride      sodium chloride      sodium chloride 100 mL/hr at 09/20/22 2025      PRN Meds:  nitroGLYCERIN, sodium chloride flush, sodium chloride, ondansetron **OR** ondansetron, polyethylene glycol, acetaminophen **OR** acetaminophen     Physical Exam:  Vitals:    09/21/22 0752   BP: (!) 143/61   Pulse: 73   Resp:    Temp: 97.9 °F (36.6 °C)   SpO2: 98%        General: AAO, NAD  Chest: Nontender  Cardiac: First and Second Heart Sounds are Normal, No Murmurs or Gallops noted  Lungs:Clear to auscultation and percussion. Abdomen: Soft, NT, ND, +BS  Extremities: No clubbing, no edema  Vascular:  Equal 2+ peripheral pulses. Lab Data:  CBC:   Recent Labs     09/20/22  0905 09/21/22  0441   WBC 7.2 4.9   HGB 11.8* 10.2*   HCT 36.2* 32.8*   MCV 91.2 94.8    186     BMP:   Recent Labs     09/20/22  0905 09/21/22  0441    138   K 4.0 4.1   CL 99 105   CO2 26 25   BUN 20 20   CREATININE 1.1 1.1     LIVER PROFILE:   Recent Labs     09/20/22  0905   AST 17   ALT 7*   BILITOT 0.5   ALKPHOS 83     PT/INR: No results for input(s): PROTIME, INR in the last 72 hours. APTT: No results for input(s): APTT in the last 72 hours. BNP:  No results for input(s): BNP in the last 72 hours.       Assessment:  Patient Active Problem List    Diagnosis Date Noted    Other chest pain 05/17/2021    S/P angioplasty 02/23/2021    Chest pain 06/16/2019    Hypertelorism 03/15/2019    Sigmoid diverticulitis     Calculus of gallbladder with acute on chronic cholecystitis without obstruction 05/10/2017    RUQ abdominal pain        Electronically signed by Barber Sanchez PA-C on 9/21/2022 at 8:09 AM     Electronically signed by Dominga Fry MD on 9/21/22 at 10:18 AM EDT

## 2022-09-21 NOTE — PROGRESS NOTES
Hospitalist Progress Note      Name:  Ellen Holman /Age/Sex: 1950  (6 y.o. female)   MRN & CSN:  [id number] & [id number] Admission Date/Time: 2022  9:13 AM   Location:  [address] [place] [address]-A PCP: Bernice Hernandez MD         Hospital Day: 2                                               Attending Physician Dr. Michelle Trejo and Plan:   Ellen Holman is a 67 y.o.  female  who presents with Chest pain    Unstable Angina  Hx of CAD s/p PCI last occurrence  with intervention to LAD   Improved today    Negative troponin trend   Continue telemetry    Cardiology following    Plan for [facility] today    DAPT/Statin    Pending limited echo    Essential hypertension- continue home antihypertensive regimen- Trends appears controlled       Diet Diet NPO   DVT Prophylaxis [x] Lovenox, []  Heparin, [] SCDs, [] Ambulation   GI Prophylaxis [x] PPI,  [] H2 Blocker,  [] Carafate,  [] Diet/Tube Feeds   Code Status Full Code     -Patient assessment and plan discussed with supervising physician-  Subjective 2022     Ellen Holman is a 67 y.o.  female, who presented with  Chest Pain    Describes chest pain from yesterday is improved   Denies HA, vision changes, fever, chills, sweats, shortness of breath, wheezing, congestion, cough, abdominal pain, diarrhea, constipation, or dysuria. Ten point ROS reviewed negative, unless as noted above    Objective:   No intake or output data in the 24 hours ending 22 0914   Vitals:   Vitals:    22 0139 22 0752 22 0835   BP: 135/60 (!) 116/56 (!) 143/61 (!) 130/58   Pulse:  70 73 70   Resp: 18 16 18    Temp: 98.4 °F (36.9 °C) 97.7 °F (36.5 °C) 97.9 °F (36.6 °C)    TempSrc: Oral Oral     SpO2: 97% 96% 98%    Weight:  145 lb 8.1 oz (66 kg)     Height:         Physical Exam: 22     GEN -Awake nontoxic appearing female, sitting upright in bed , NAD. obese body habitus. Appears given age. EYES -Pupils are equally round.   No scleral erythema, discharge, or conjunctivitis. HENT -MM are moist. Oral pharynx without exudates, no evidence of thrush. NECK -Supple, no apparent thyromegaly or masses. RESP -CTA, no wheezes, rales or rhonchi. Symmetric chest movement while on RA.  C/V -S1/S2 auscultated. RRR without appreciable M/R/G. No JVD or carotid bruits. Peripheral pulses equal bilaterally and palpable. No peripheral edema. GI -Abdomen is soft non distended and without significant tenderness. No masses or guarding. + BS Rectal exam deferred.  -No CVA tenderness. Barnard catheter is not present. LYMPH-No palpable cervical lymphadenopathy and no hepatosplenomegaly. No petechiae or ecchymoses. MS -No gross joint deformities. SKIN -Normal coloration, warm, dry. NEURO-Cranial nerves appear grossly intact, normal speech, no lateralizing weakness. PSYC-Awake, alert, oriented x 4. Appropriate affect.     Medications:   Medications:    aspirin  81 mg Oral Daily    atorvastatin  80 mg Oral Nightly    carvedilol  6.25 mg Oral BID    ezetimibe  10 mg Oral Daily    ticagrelor  90 mg Oral BID    sodium chloride flush  5-40 mL IntraVENous 2 times per day    enoxaparin  40 mg SubCUTAneous Daily      Infusions:    sodium chloride 75 mL/hr at 09/21/22 0818    sodium chloride      sodium chloride 100 mL/hr at 09/20/22 2025     PRN Meds: nitroGLYCERIN, 0.4 mg, Q5 Min PRN  sodium chloride flush, 5-40 mL, PRN  sodium chloride, , PRN  ondansetron, 4 mg, Q8H PRN   Or  ondansetron, 4 mg, Q6H PRN  polyethylene glycol, 17 g, Daily PRN  acetaminophen, 650 mg, Q6H PRN   Or  acetaminophen, 650 mg, Q6H PRN        LABS  CBC   Recent Labs     09/20/22 0905 09/21/22  0441   WBC 7.2 4.9   HGB 11.8* 10.2*   HCT 36.2* 32.8*    186      RENAL  Recent Labs     09/20/22 0905 09/21/22  0441    138   K 4.0 4.1   CL 99 105   CO2 26 25   BUN 20 20   CREATININE 1.1 1.1     LFT'S  Recent Labs     09/20/22 0905   AST 17   ALT 7*   BILITOT 0.5   ALKPHOS 83 Radiology this visit:  Reviewed. XR CHEST (2 VW)    Result Date: 9/20/2022  EXAMINATION: TWO XRAY VIEWS OF THE CHEST 9/20/2022 10:21 am COMPARISON: 01/10/2022 HISTORY: ORDERING SYSTEM PROVIDED HISTORY: Chest Pain TECHNOLOGIST PROVIDED HISTORY: Reason for exam:->Chest Pain Reason for Exam: chest pain FINDINGS: The cardiomediastinal silhouette is unchanged. Atherosclerosis of the aorta is again noted. Sequela of prior granulomatous disease. No new focal consolidation, pneumothorax, or pleural effusion. Osseous structures are diffusely demineralized and grossly unchanged. No acute cardiopulmonary findings.              Electronically signed by МАРИНА Alba CNP on 9/21/2022 at 9:14 AM

## 2022-09-21 NOTE — PROGRESS NOTES
Comprehensive Nutrition Assessment    Type and Reason for Visit:  Initial, Positive Nutrition Screen (Weight loss, Poor appetite)    Nutrition Recommendations/Plan:   Advance diet as medically able  Recommend Low Caffeine diet with Standard High Protein/High Calorie oral nutrition supplement QD, will add once diet advances  Monitor fluids, weights, appetite change, labs, and POC      Malnutrition Assessment:  Malnutrition Status:  Insufficient data (09/21/22 1215)    Context:  Acute Illness     Findings of the 6 clinical characteristics of malnutrition:  Energy Intake:  Mild decrease in energy intake (Comment)  Weight Loss:  Unable to assess     Body Fat Loss:  Unable to assess     Muscle Mass Loss:  Unable to assess    Fluid Accumulation:  No significant fluid accumulation     Strength:  Not Performed    Nutrition Assessment:    Admitted with chest pain. Hx CAD s/p PCI, HLD, HTN, COPD, DM. Pt NPO and off of unit for cardiac procedure. MAT unintentional wt loss per chart review. Will monitor as high nutrition risk at this time due to predicted inadequate oral intake. Nutrition Related Findings:    GFR 49 Wound Type: None       Current Nutrition Intake & Therapies:    Average Meal Intake: NPO  Average Supplements Intake: NPO  Diet NPO    Anthropometric Measures:  Height: 5' 7\" (170.2 cm)  Ideal Body Weight (IBW): 135 lbs (61 kg)    Admission Body Weight: 145 lb 8.1 oz (66 kg)  Current Body Weight: 145 lb 8.1 oz (66 kg), 107.8 % IBW.  Weight Source: Bed Scale  Current BMI (kg/m2): 22.8  Usual Body Weight: 153 lb (69.4 kg) (5/2021)  % Weight Change (Calculated): -4.9  Weight Adjustment For: No Adjustment  BMI Categories: Normal Weight (BMI 22.0 to 24.9) age over 72    Estimated Daily Nutrient Needs:  Energy Requirements Based On: Kcal/kg  Weight Used for Energy Requirements: Admission  Energy (kcal/day): 5378-2385 (25-30 kcals/kg)  Weight Used for Protein Requirements: Ideal  Protein (g/day): 61-74 (1-1.2 g/kg)  Method Used for Fluid Requirements: 1 ml/kcal  Fluid (ml/day): 6873-2023    Nutrition Diagnosis:   Predicted inadequate energy intake related to cardiac dysfunction, pain (reduced appetite related to acute illness) as evidenced by NPO or clear liquid status due to medical condition, weight loss (5% in 1 yr)    Nutrition Interventions:   Food and/or Nutrient Delivery: Start Oral Diet, Start Oral Nutrition Supplement (when medically able)  Nutrition Education/Counseling: Education needed  Coordination of Nutrition Care: Continue to monitor while inpatient       Goals:     Goals: Initiate PO diet, within 2 days       Nutrition Monitoring and Evaluation:   Behavioral-Environmental Outcomes: None Identified  Food/Nutrient Intake Outcomes: Diet Advancement/Tolerance  Physical Signs/Symptoms Outcomes: Biochemical Data, GI Status, Meal Time Behavior, Weight    Discharge Planning:     Too soon to determine     Jose Ventura RD, LD  Contact: [phone]

## 2022-09-22 NOTE — DISCHARGE SUMMARY
Discharge Summary    Name:  Lillian Arzate /Age/Sex: 1950  (70 y.o. female)   MRN & CSN:  [id number] & [id number] Admission Date/Time: 2022  9:13 AM   Attending:  No att. providers found Discharging Provider Vivi Herman, [address] [facility] Avelino Trammell Course:   Lillian Arzate is a 67 y.o.  female  who presents with Chest pain    Hospital Course: Patient was initially mated 2022 for concerns of chest pain with concern for ACS given previous history of coronary disease. She was evaluated cardiology taken for cardiac catheterization due to concern for in-stent stenosis. Her LHC revealed patent LAD/OM stents. Her chest pain was resolved  and cardiology cleared for discharge. She was discharged in stable condition with outpatient follow-up. Unstable angina. History of CAD s/p PCI (2022)   TTE-Limited () EF 55 to 60% sclerotic/nonstenotic AV trace TR    LHC-patent stents. No postprocedure complications noted. Follow-up with cardiology outpatient   Continue statin/DAPT    Hypertension-continue home antihypertensive regimen. The patient expressed appropriate understanding of and agreement with the discharge recommendations, medications, and plan.      Consults this admission:  IP CONSULT TO HOSPITALIST  IP CONSULT TO CARDIOLOGY    Discharge Instruction:   Follow up appointments: Cardiology x2 weeks  Primary care physician:  within 2 weeks    Diet:  regular diet   Activity: activity as tolerated  Disposition: Discharged to:   [x]Home, [][facility], []SNF, []Acute Rehab, []Hospice   Condition on discharge: Stable    Discharge Medications:        Medication List        CONTINUE taking these medications      aspirin 81 MG chewable tablet  Take 1 tablet by mouth daily     atorvastatin 80 MG tablet  Commonly known as: LIPITOR  Take 1 tablet by mouth nightly     carvedilol 6.25 MG tablet  Commonly known as: COREG     ezetimibe 10 MG tablet  Commonly known as: Sobia Shaikh Birth      1950        Gender                Female   Age                67 year(s)        Race                     Patient Number     [id number]        Room Number           CATH   Visit Number       [id number]   Corporate ID       [id number]   Accession Number   [id number]        [facility]   Ordering Physician Rakesh Abad MD  Interpreting          Rakesh Abad MD                                       Physician  Procedure Type of Study   TTE procedure:ECHOCARDIOGRAM LIMITED. Procedure Date Date: 09/21/2022 Start: 09:28 AM Study Location: Portable Technical Quality: Adequate visualization Indications:Chest pain. Height: 67 inches Weight: 145 pounds BSA: 1.76 m2 BMI: 22.71 kg/m2 HR: 69 bpm BP: 143/61 mmHg  Conclusions   Summary  Left ventricular systolic function is normal.  Ejection fraction is visually estimated at 55-60%. Indeterminate diastolic function. Left ventricle size is normal.  No regional wall motion abnormality. Sclerotic, but non-stenotic aortic valve. Aortic valve appears tricuspid. Trace tricuspid regurgitation; RVSP: 25 mmHg. No evidence of any pericardial effusion. Signature   ------------------------------------------------------------------  Electronically signed by Rakesh Abad MD (Interpreting  physician) on 09/21/2022 at 01:17 PM  ------------------------------------------------------------------   Findings   Left Ventricle  Left ventricular systolic function is normal.  Ejection fraction is visually estimated at 55-60%. Indeterminate diastolic function. Left ventricle size is normal.  No regional wall motion abnormality. Left Atrium  Essentially normal left atrium. Right Atrium  Essentially normal Right atrium. Right Ventricle  Essentially normal right ventricle. Aortic Valve  Sclerotic, but non-stenotic aortic valve. Aortic valve appears tricuspid. Mitral Valve  Structurally normal mitral valve. Trace mitral regurgitation. Tricuspid Valve  Trace tricuspid regurgitation; RVSP: 25 mmHg. Pulmonic Valve  Trace pulmonic regurgitation present. Pericardial Effusion  No evidence of any pericardial effusion. Pleural Effusion  No evidence of pleural effusion. Miscellaneous  IVC and abdominal appears normal in size and function.   M-Mode/2D Measurements & Calculations   LV Diastolic Dimension:  LV Systolic Dimension:  LA Dimension: 2.8 cmAO Root  3.66 cm                  2.23 cm                 Dimension: 3.2 cmLA Area:  LV FS:39.1 %             LV Volume Diastolic: 39 20.0 cm2  LV PW Diastolic: 1.37 cm ml  LV PW Systolic: 9.53 cm  LV Volume Systolic: 13  Septum Diastolic: 8.11   ml  cm                       LV EDV/LV EDV Index: 39  Septum Systolic: 4.82 cm HP/81 I6IF ESV/LV ESV   LA/Aorta: 0.88  CO: 4.22 l/min           Index: 13 ml/7 m2       Ascending Aorta: 2.8 cm  CI: 2.4 l/m*m2           EF Calculated (A4C):    LA volume/Index: 37 ml                           66.7 %                  /80V6  LV Area Diastolic: 79.6  EF Calculated (2D):  cm2                      70.3 %  LV Area Systolic: 3.48  cm2                      LV Length: 6.41 cm                            LVOT: 1.9 cm  Doppler Measurements & Calculations   MV Peak E-Wave: 128  AV Peak Velocity: 107 cm/s    LVOT Peak Velocity: 85.7  cm/s                 AV Peak Gradient: 4.58 mmHg   cm/s  MV Peak A-Wave: 74.5 AV Mean Velocity: 84.5 cm/s   LVOT Mean Velocity: 61.7  cm/s                 AV Mean Gradient: 3 mmHg      cm/s  MV E/A Ratio: 1.72   AV VTI: 28.6 cm               LVOT Peak Gradient: 3  MV Peak Gradient:    AV Area (Continuity):2.14 cm2 mmHgLVOT Mean Gradient: 2  6.55 mmHg                                          mmHg                       LVOT VTI: 21.6 cm             Estimated RVSP: 25 mmHg  MV P1/2t: 47 msec                                  Estimated RAP:3 mmHg  MVA by PHT:4.68 cm2  Estimated PASP: 18.68 mmHg   MV E' Septal                                       TR

## 2024-07-23 ENCOUNTER — HOSPITAL ENCOUNTER (OUTPATIENT)
Age: 74
Setting detail: OBSERVATION
Discharge: HOME | End: 2024-07-24
Attending: EMERGENCY MEDICINE | Admitting: HOSPITALIST
Payer: MEDICARE

## 2024-07-23 ENCOUNTER — APPOINTMENT (OUTPATIENT)
Dept: GENERAL RADIOLOGY | Age: 74
End: 2024-07-23
Payer: MEDICARE

## 2024-07-23 DIAGNOSIS — R07.9 CHEST PAIN, UNSPECIFIED TYPE: Primary | ICD-10-CM

## 2024-07-23 LAB
ALBUMIN SERPL-MCNC: 4.1 GM/DL (ref 3.4–5)
ALP BLD-CCNC: 57 IU/L (ref 40–128)
ALT SERPL-CCNC: 9 U/L (ref 10–40)
ANION GAP SERPL CALCULATED.3IONS-SCNC: 11 MMOL/L (ref 7–16)
AST SERPL-CCNC: 15 IU/L (ref 15–37)
BASOPHILS ABSOLUTE: 0 K/CU MM
BASOPHILS RELATIVE PERCENT: 0.9 % (ref 0–1)
BILIRUB SERPL-MCNC: 0.5 MG/DL (ref 0–1)
BUN SERPL-MCNC: 17 MG/DL (ref 6–23)
CALCIUM SERPL-MCNC: 9.4 MG/DL (ref 8.3–10.6)
CHLORIDE BLD-SCNC: 103 MMOL/L (ref 99–110)
CO2: 26 MMOL/L (ref 21–32)
CREAT SERPL-MCNC: 1.1 MG/DL (ref 0.6–1.1)
DIFFERENTIAL TYPE: ABNORMAL
EOSINOPHILS ABSOLUTE: 0.1 K/CU MM
EOSINOPHILS RELATIVE PERCENT: 2.6 % (ref 0–3)
GFR, ESTIMATED: 52 ML/MIN/1.73M2
GLUCOSE SERPL-MCNC: 106 MG/DL (ref 70–99)
HCT VFR BLD CALC: 35.7 % (ref 37–47)
HEMOGLOBIN: 11.9 GM/DL (ref 12.5–16)
IMMATURE NEUTROPHIL %: 0.2 % (ref 0–0.43)
LIPASE: 30 IU/L (ref 13–60)
LYMPHOCYTES ABSOLUTE: 0.8 K/CU MM
LYMPHOCYTES RELATIVE PERCENT: 17.3 % (ref 24–44)
MAGNESIUM: 1.8 MG/DL (ref 1.8–2.4)
MCH RBC QN AUTO: 31.1 PG (ref 27–31)
MCHC RBC AUTO-ENTMCNC: 33.3 % (ref 32–36)
MCV RBC AUTO: 93.2 FL (ref 78–100)
MONOCYTES ABSOLUTE: 0.4 K/CU MM
MONOCYTES RELATIVE PERCENT: 9.1 % (ref 0–4)
NEUTROPHILS ABSOLUTE: 3.2 K/CU MM
NEUTROPHILS RELATIVE PERCENT: 69.9 % (ref 36–66)
NUCLEATED RBC %: 0 %
PDW BLD-RTO: 13 % (ref 11.7–14.9)
PLATELET # BLD: 225 K/CU MM (ref 140–440)
PMV BLD AUTO: 9.6 FL (ref 7.5–11.1)
POTASSIUM SERPL-SCNC: 3.9 MMOL/L (ref 3.5–5.1)
RBC # BLD: 3.83 M/CU MM (ref 4.2–5.4)
SODIUM BLD-SCNC: 140 MMOL/L (ref 135–145)
TOTAL IMMATURE NEUTOROPHIL: 0.01 K/CU MM
TOTAL NUCLEATED RBC: 0 K/CU MM
TOTAL PROTEIN: 7.1 GM/DL (ref 6.4–8.2)
TOTAL RETICULOCYTE COUNT: 0.05 K/CU MM
TROPONIN, HIGH SENSITIVITY: 10 NG/L (ref 0–14)
TROPONIN, HIGH SENSITIVITY: 12 NG/L (ref 0–14)
TROPONIN, HIGH SENSITIVITY: 14 NG/L (ref 0–14)
WBC # BLD: 4.6 K/CU MM (ref 4–10.5)

## 2024-07-23 PROCEDURE — 96372 THER/PROPH/DIAG INJ SC/IM: CPT

## 2024-07-23 PROCEDURE — 83735 ASSAY OF MAGNESIUM: CPT

## 2024-07-23 PROCEDURE — 71045 X-RAY EXAM CHEST 1 VIEW: CPT

## 2024-07-23 PROCEDURE — 96375 TX/PRO/DX INJ NEW DRUG ADDON: CPT

## 2024-07-23 PROCEDURE — 96374 THER/PROPH/DIAG INJ IV PUSH: CPT

## 2024-07-23 PROCEDURE — 36415 COLL VENOUS BLD VENIPUNCTURE: CPT

## 2024-07-23 PROCEDURE — G0378 HOSPITAL OBSERVATION PER HR: HCPCS

## 2024-07-23 PROCEDURE — 84484 ASSAY OF TROPONIN QUANT: CPT

## 2024-07-23 PROCEDURE — 6360000002 HC RX W HCPCS: Performed by: EMERGENCY MEDICINE

## 2024-07-23 PROCEDURE — 83690 ASSAY OF LIPASE: CPT

## 2024-07-23 PROCEDURE — 99285 EMERGENCY DEPT VISIT HI MDM: CPT

## 2024-07-23 PROCEDURE — 85025 COMPLETE CBC W/AUTO DIFF WBC: CPT

## 2024-07-23 PROCEDURE — 99222 1ST HOSP IP/OBS MODERATE 55: CPT | Performed by: INTERNAL MEDICINE

## 2024-07-23 PROCEDURE — 6360000002 HC RX W HCPCS: Performed by: PHYSICIAN ASSISTANT

## 2024-07-23 PROCEDURE — 93005 ELECTROCARDIOGRAM TRACING: CPT | Performed by: EMERGENCY MEDICINE

## 2024-07-23 PROCEDURE — 80053 COMPREHEN METABOLIC PANEL: CPT

## 2024-07-23 PROCEDURE — 6370000000 HC RX 637 (ALT 250 FOR IP): Performed by: PHYSICIAN ASSISTANT

## 2024-07-23 PROCEDURE — 94761 N-INVAS EAR/PLS OXIMETRY MLT: CPT

## 2024-07-23 PROCEDURE — 2580000003 HC RX 258: Performed by: PHYSICIAN ASSISTANT

## 2024-07-23 RX ORDER — CARVEDILOL 6.25 MG/1
6.25 TABLET ORAL 2 TIMES DAILY
Status: DISCONTINUED | OUTPATIENT
Start: 2024-07-23 | End: 2024-07-24 | Stop reason: HOSPADM

## 2024-07-23 RX ORDER — POLYETHYLENE GLYCOL 3350 17 G/17G
17 POWDER, FOR SOLUTION ORAL DAILY PRN
Status: DISCONTINUED | OUTPATIENT
Start: 2024-07-23 | End: 2024-07-24 | Stop reason: HOSPADM

## 2024-07-23 RX ORDER — SODIUM CHLORIDE 0.9 % (FLUSH) 0.9 %
5-40 SYRINGE (ML) INJECTION EVERY 12 HOURS SCHEDULED
Status: DISCONTINUED | OUTPATIENT
Start: 2024-07-23 | End: 2024-07-24 | Stop reason: HOSPADM

## 2024-07-23 RX ORDER — LOSARTAN POTASSIUM AND HYDROCHLOROTHIAZIDE 25; 100 MG/1; MG/1
0.5 TABLET ORAL DAILY
Status: DISCONTINUED | OUTPATIENT
Start: 2024-07-24 | End: 2024-07-23

## 2024-07-23 RX ORDER — ONDANSETRON 2 MG/ML
4 INJECTION INTRAMUSCULAR; INTRAVENOUS EVERY 6 HOURS PRN
Status: DISCONTINUED | OUTPATIENT
Start: 2024-07-23 | End: 2024-07-24 | Stop reason: HOSPADM

## 2024-07-23 RX ORDER — SODIUM CHLORIDE 0.9 % (FLUSH) 0.9 %
5-40 SYRINGE (ML) INJECTION PRN
Status: DISCONTINUED | OUTPATIENT
Start: 2024-07-23 | End: 2024-07-24 | Stop reason: HOSPADM

## 2024-07-23 RX ORDER — SODIUM CHLORIDE 9 MG/ML
INJECTION, SOLUTION INTRAVENOUS PRN
Status: DISCONTINUED | OUTPATIENT
Start: 2024-07-23 | End: 2024-07-24 | Stop reason: HOSPADM

## 2024-07-23 RX ORDER — MORPHINE SULFATE 2 MG/ML
2 INJECTION, SOLUTION INTRAMUSCULAR; INTRAVENOUS EVERY 4 HOURS PRN
Status: DISCONTINUED | OUTPATIENT
Start: 2024-07-23 | End: 2024-07-24 | Stop reason: HOSPADM

## 2024-07-23 RX ORDER — LOSARTAN POTASSIUM 25 MG/1
50 TABLET ORAL DAILY
Status: DISCONTINUED | OUTPATIENT
Start: 2024-07-24 | End: 2024-07-24 | Stop reason: HOSPADM

## 2024-07-23 RX ORDER — ATORVASTATIN CALCIUM 40 MG/1
80 TABLET, FILM COATED ORAL NIGHTLY
Status: DISCONTINUED | OUTPATIENT
Start: 2024-07-23 | End: 2024-07-24 | Stop reason: HOSPADM

## 2024-07-23 RX ORDER — EZETIMIBE 10 MG/1
10 TABLET ORAL DAILY
Status: DISCONTINUED | OUTPATIENT
Start: 2024-07-24 | End: 2024-07-24 | Stop reason: HOSPADM

## 2024-07-23 RX ORDER — ONDANSETRON 4 MG/1
4 TABLET, ORALLY DISINTEGRATING ORAL EVERY 8 HOURS PRN
Status: DISCONTINUED | OUTPATIENT
Start: 2024-07-23 | End: 2024-07-24 | Stop reason: HOSPADM

## 2024-07-23 RX ORDER — ACETAMINOPHEN 325 MG/1
650 TABLET ORAL EVERY 6 HOURS PRN
Status: DISCONTINUED | OUTPATIENT
Start: 2024-07-23 | End: 2024-07-24 | Stop reason: HOSPADM

## 2024-07-23 RX ORDER — ONDANSETRON 2 MG/ML
4 INJECTION INTRAMUSCULAR; INTRAVENOUS ONCE
Status: COMPLETED | OUTPATIENT
Start: 2024-07-23 | End: 2024-07-23

## 2024-07-23 RX ORDER — MORPHINE SULFATE 4 MG/ML
4 INJECTION, SOLUTION INTRAMUSCULAR; INTRAVENOUS ONCE
Status: COMPLETED | OUTPATIENT
Start: 2024-07-23 | End: 2024-07-23

## 2024-07-23 RX ORDER — ENOXAPARIN SODIUM 100 MG/ML
40 INJECTION SUBCUTANEOUS DAILY
Status: DISCONTINUED | OUTPATIENT
Start: 2024-07-23 | End: 2024-07-24 | Stop reason: HOSPADM

## 2024-07-23 RX ORDER — ASPIRIN 81 MG/1
81 TABLET, CHEWABLE ORAL DAILY
Status: DISCONTINUED | OUTPATIENT
Start: 2024-07-24 | End: 2024-07-24 | Stop reason: HOSPADM

## 2024-07-23 RX ORDER — ACETAMINOPHEN 650 MG/1
650 SUPPOSITORY RECTAL EVERY 6 HOURS PRN
Status: DISCONTINUED | OUTPATIENT
Start: 2024-07-23 | End: 2024-07-24 | Stop reason: HOSPADM

## 2024-07-23 RX ORDER — HYDROCHLOROTHIAZIDE 25 MG/1
12.5 TABLET ORAL DAILY
Status: DISCONTINUED | OUTPATIENT
Start: 2024-07-24 | End: 2024-07-24 | Stop reason: HOSPADM

## 2024-07-23 RX ADMIN — MORPHINE SULFATE 4 MG: 4 INJECTION, SOLUTION INTRAMUSCULAR; INTRAVENOUS at 10:55

## 2024-07-23 RX ADMIN — TICAGRELOR 90 MG: 90 TABLET ORAL at 21:16

## 2024-07-23 RX ADMIN — ENOXAPARIN SODIUM 40 MG: 100 INJECTION SUBCUTANEOUS at 14:11

## 2024-07-23 RX ADMIN — SODIUM CHLORIDE, PRESERVATIVE FREE 10 ML: 5 INJECTION INTRAVENOUS at 21:19

## 2024-07-23 RX ADMIN — ONDANSETRON 4 MG: 2 INJECTION INTRAMUSCULAR; INTRAVENOUS at 10:55

## 2024-07-23 RX ADMIN — ATORVASTATIN CALCIUM 80 MG: 40 TABLET, FILM COATED ORAL at 21:16

## 2024-07-23 NOTE — PLAN OF CARE
Problem: Discharge Planning  Goal: Discharge to home or other facility with appropriate resources  Outcome: Progressing  Flowsheets (Taken 7/23/2024 1162)  Discharge to home or other facility with appropriate resources:   Identify barriers to discharge with patient and caregiver   Arrange for needed discharge resources and transportation as appropriate   Refer to discharge planning if patient needs post-hospital services based on physician order or complex needs related to functional status, cognitive ability or social support system   Identify discharge learning needs (meds, wound care, etc)     Problem: Pain  Goal: Verbalizes/displays adequate comfort level or baseline comfort level  Outcome: Progressing     Problem: Safety - Adult  Goal: Free from fall injury  Outcome: Progressing     Problem: Chronic Conditions and Co-morbidities  Goal: Patient's chronic conditions and co-morbidity symptoms are monitored and maintained or improved  Outcome: Progressing     Problem: Neurosensory - Adult  Goal: Achieves stable or improved neurological status  Outcome: Progressing  Goal: Achieves maximal functionality and self care  Outcome: Progressing     Problem: Respiratory - Adult  Goal: Achieves optimal ventilation and oxygenation  Outcome: Progressing     Problem: Cardiovascular - Adult  Goal: Maintains optimal cardiac output and hemodynamic stability  Outcome: Progressing  Goal: Absence of cardiac dysrhythmias or at baseline  Outcome: Progressing     Problem: Skin/Tissue Integrity - Adult  Goal: Skin integrity remains intact  Outcome: Progressing  Goal: Incisions, wounds, or drain sites healing without S/S of infection  Outcome: Progressing  Goal: Oral mucous membranes remain intact  Outcome: Progressing     Problem: Musculoskeletal - Adult  Goal: Return mobility to safest level of function  Outcome: Progressing  Goal: Maintain proper alignment of affected body part  Outcome: Progressing  Goal: Return ADL status to a safe

## 2024-07-23 NOTE — H&P
while at rest.  Describes the pain as pressure with radiation down her left arm associated with some shortness of breath.  Did note that the pain was getting worse with exertion and continued into this morning.  States this feels similar to when she needed stents in the past.  In the emergency department she received aspirin and morphine with improvement in her pain.  She denies any associated lightheadedness, syncope, nausea, vomiting, diaphoresis.  Reports compliance with her home medications.     Review of Systems:   Pertinent positives and negatives discussed in HPI     Objective:   No intake or output data in the 24 hours ending 07/23/24 1205   Vitals:   Vitals:    07/23/24 1002 07/23/24 1032 07/23/24 1055 07/23/24 1103   BP: (!) 125/59 138/68  127/63   Pulse: 61 59  58   Resp: 14 17 18 19   Temp:       SpO2: 96% 97%  97%       Medications Prior to Admission     Prior to Admission medications    Medication Sig Start Date End Date Taking? Authorizing Provider   carvedilol (COREG) 6.25 MG tablet Take 1 tablet by mouth 2 times daily 9/19/22   Anne-Marie Dupont MD   ezetimibe (ZETIA) 10 MG tablet Take 1 tablet by mouth daily 9/19/22   Anne-Marie Dupont MD   losartan-hydroCHLOROthiazide (HYZAAR) 100-25 MG per tablet Take 0.5 tablets by mouth daily 07/23/24 Patient reports she splits in half for 12.5/50 mg dosing 8/24/22   Anne-Marie Dupont MD   atorvastatin (LIPITOR) 80 MG tablet Take 1 tablet by mouth nightly 1/10/22   Honorio Álvarez MD   ticagrelor (BRILINTA) 90 MG TABS tablet Take 1 tablet by mouth 2 times daily 1/11/22 7/23/24  Honorio Álvarez MD   aspirin 81 MG chewable tablet Take 1 tablet by mouth daily 3/17/19   Ben Harper MD   nitroGLYCERIN (NITROSTAT) 0.4 MG SL tablet up to max of 3 total doses. If no relief after 1 dose, call 911. 3/16/19   Ben Harper MD       Physical Exam:   General: NAD  Eyes: EOMI  ENT: neck supple  Cardiovascular: Regular rate and rhythm  Respiratory: Clear to

## 2024-07-23 NOTE — ED PROVIDER NOTES
Emergency Department Encounter    Patient: Francy Yañez  MRN: [id number]  : 1950  Date of Evaluation: 2024  ED Provider:  Antonia Davey DO    Triage Chief Complaint:   Chest Pain (Pressure in center. Hx 2 stents ) and Arm Pain (Radiating from chest down )    [place]:  Francy Yañez is a 74 y.o. female that presents with chest pain going down her left arm. Pain is now 5-6/10.  Previously it was 8-9/10.  She took a full aspirin this morning.  She has a prescription for nitroglycerin but has never used it before and did not today.  She goes to Dr. Rivera, cardiology.  No prior hx of MI.  She had a stress test a long time ago.  She has two stents and is on Brilinta.  She was found to have CAD on a cath 3-4 years ago.  + shortness of breath but not today. No sweating.  She had dizziness last night when she laid down in bed and the room started going around.  No blood in stool.  No cough, fever, congestion.  Pain is similar to when she had a blockage and stents.  She also notes passing out while vaccuming a few weeks ago.  She caught herself going down and hurt her tailbone which is now better.  She did not see a doctor at that time.    ROS - see HPI, below listed is current ROS at time of my eval:   systems reviewed and negative except as above.     Past Medical History:   Diagnosis Date    CAD (coronary artery disease)     Hyperlipidemia     Hypertension     \"on medication since \"     Past Surgical History:   Procedure Laterality Date    ABDOMINOPLASTY  10+ yrs ago    \"tummy tuck\"    BREAST REDUCTION SURGERY      [place]    CHOLECYSTECTOMY, LAPAROSCOPIC  05/10/2017    CORONARY ANGIOPLASTY WITH STENT PLACEMENT      HYSTERECTOMY (CERVIX STATUS UNKNOWN)  age 37    left both ovaries\"    KNEE ARTHROPLASTY Right 2015    THYROID SURGERY      right side of thyroid removed     Family History   Problem Relation Age of Onset    Stroke Mother     No Known Problems Father     Heart Disease Brother      Social History

## 2024-07-23 NOTE — ED NOTES
Medication History  [facility]    Patient Name: Francy Yañez 1950     Medication history has been completed by: Rere Ventura CPhT    Source(s) of information: patient and insurance claims     Primary Care Physician: Juanjose Mishra MD     Pharmacy: [facility]    Allergies as of 07/23/2024 - Fully Reviewed 07/23/2024   Allergen Reaction Noted    Dye [iodides] Hives 07/23/2015    Ibuprofen Other (See Comments) 07/23/2015        Prior to Admission medications    Medication Sig Start Date End Date Taking? Authorizing Provider   carvedilol (COREG) 6.25 MG tablet Take 1 tablet by mouth 2 times daily 9/19/22   Anne-Marie Dupont MD   ezetimibe (ZETIA) 10 MG tablet Take 1 tablet by mouth daily 9/19/22   Anne-Marie Dupont MD   losartan-hydroCHLOROthiazide (HYZAAR) 100-25 MG per tablet Take 0.5 tablets by mouth daily 07/23/24 Patient reports she splits in half for 12.5/50 mg dosing 8/24/22   Anne-Marie Dupont MD   atorvastatin (LIPITOR) 80 MG tablet Take 1 tablet by mouth nightly 1/10/22   Honorio Álvarez MD   ticagrelor (BRILINTA) 90 MG TABS tablet Take 1 tablet by mouth 2 times daily 1/11/22   Honorio Álvarez MD   aspirin 81 MG chewable tablet Take 1 tablet by mouth daily 3/17/19   Ben Harper MD   nitroGLYCERIN (NITROSTAT) 0.4 MG SL tablet up to max of 3 total doses. If no relief after 1 dose, call 911. 3/16/19   Ben Harper MD     Medications added or changed (ex. new medication, dosage change, interval change, formulation change):  Hyzaar dosage change from 100/25 mg to 12.5/50 mg per patient    Comments:  Medication list reviewed with patient and insurance claims verified.  Patient reports she has taken first dose of medications today.    To my knowledge the above medication history is accurate as of 7/23/2024 11:04 AM.   Rere Ventura CPhT   7/23/2024 11:04 AM

## 2024-07-23 NOTE — ED NOTES
ED TO INPATIENT SBAR HANDOFF    Patient Name: Francy Yañez   :  1950  74 y.o.   Preferred Name    Family/Caregiver Present yes   Restraints no   C-SSRS: Risk of Suicide: No Risk  Sitter no   Sepsis Risk Score        Situation  Chief Complaint   Patient presents with    Chest Pain     Pressure in center. Hx 2 stents     Arm Pain     Radiating from chest down      Brief Description of Patient's Condition: Pt presented to ED with c/o chest pain. Pt states it feels like pressure in center of chest with pain radiating in both shoulders. Hx of 2 stents.   Mental Status: oriented, alert, coherent, logical, thought processes intact, and able to concentrate and follow conversation  Arrived from: home    Imaging:   XR CHEST PORTABLE   Final Result      1.  No acute cardiopulmonary process.      Electronically signed by Hiral Doss        Abnormal labs:   Abnormal Labs Reviewed   CBC WITH AUTO DIFFERENTIAL - Abnormal; Notable for the following components:       Result Value    RBC 3.83 (*)     Hemoglobin 11.9 (*)     Hematocrit 35.7 (*)     MCH 31.1 (*)     Neutrophils % 69.9 (*)     Lymphocytes % 17.3 (*)     Monocytes % 9.1 (*)     All other components within normal limits   COMPREHENSIVE METABOLIC PANEL - Abnormal; Notable for the following components:    Glucose 106 (*)     Est, Glom Filt Rate 52 (*)     ALT 9 (*)     All other components within normal limits        Background  History:   Past Medical History:   Diagnosis Date    CAD (coronary artery disease)     Hyperlipidemia     Hypertension     \"on medication since \"       Assessment    Vitals:        Vitals:    24 1002 24 1032 24 1055 24 1103   BP: (!) 125/59 138/68  127/63   Pulse: 61 59  58   Resp: 14 17 18 19   Temp:       SpO2: 96% 97%  97%     PO Status: Nothing by Mouth  O2 Flow Rate:      Cardiac Rhythm:   Last documented pain medication administered: 1055  NIH Score: NIH     Active LDA's:   Peripheral IV 24

## 2024-07-23 NOTE — CONSULTS
Name:  Francy Yañez /Age/Sex: 1950  (74 y.o. female)   MRN & CSN:  [id number] & [id number] Admission Date/Time: 2024  9:40 AM   Location:  ED18/ED-18 PCP: Juanjose Mishra MD       Hospital Day: 1          Referring physician:  Justyna Rivera MD         Reason for consultation: Chest pain        Thanks for referral.    Information source: Patient    CC; chest pain      HPI:   Thank you for involving me in taking  care of Francy Yañez who  is a 74 y.o.year  Old female  Presents with history of PCI, hypertension And hyperlipidemia history of PCI in the past last cath was done which showed stent in the LAD was patent and obtuse marginal was patent as well now presents with chest pain however at present she is chest pain-free EKG and troponins are unremarkable                  Past medical history:    has a past medical history of CAD (coronary artery disease), Hyperlipidemia, and Hypertension.  Past surgical history:   has a past surgical history that includes Knee Arthroplasty (Right, 2015); Abdominoplasty (10+ yrs ago); Breast reduction surgery (); Hysterectomy (age 37); Cholecystectomy, laparoscopic (05/10/2017); Coronary angioplasty with stent; and Thyroid surgery.  Social History:   reports that she has never smoked. She has never used smokeless tobacco. She reports current alcohol use. She reports that she does not use drugs.  Family history:  family history includes Heart Disease in her brother; No Known Problems in her father; Stroke in her mother.    Allergies   Allergen Reactions    Dye [Iodides] Hives     \"Broke out in hives and got real red and hot.\"    Ibuprofen Other (See Comments)     \"Slows heart rate way down\"       No current facility-administered medications for this encounter.    No current facility-administered medications for this encounter.     Current Outpatient Medications   Medication Sig Dispense Refill    carvedilol (COREG) 6.25 MG tablet Take 1 tablet by

## 2024-07-23 NOTE — ED PROVIDER NOTES
12 lead EKG per my interpretation:  Normal Sinus Rhythm at 62  Gause is   Left axis deviation  QTc is  within an acceptable range  There is no specific T wave changes appreciated.  There is no specific ST wave changes appreciated.  No STEMI    Prior EKG to compare with was available no clinically significant change in overall morphology.    MD Juan Ramon Booth Andrew, MD  07/23/24 7651

## 2024-07-24 ENCOUNTER — HOSPITAL ENCOUNTER (OUTPATIENT)
Dept: NON INVASIVE DIAGNOSTICS | Age: 74
Setting detail: OBSERVATION
Discharge: HOME | End: 2024-07-26
Payer: MEDICARE

## 2024-07-24 ENCOUNTER — APPOINTMENT (OUTPATIENT)
Dept: NUCLEAR MEDICINE | Age: 74
End: 2024-07-24
Payer: MEDICARE

## 2024-07-24 VITALS
SYSTOLIC BLOOD PRESSURE: 143 MMHG | BODY MASS INDEX: 22.15 KG/M2 | HEART RATE: 64 BPM | WEIGHT: 141.09 LBS | RESPIRATION RATE: 17 BRPM | OXYGEN SATURATION: 99 % | DIASTOLIC BLOOD PRESSURE: 60 MMHG | TEMPERATURE: 97.2 F | HEIGHT: 67 IN

## 2024-07-24 PROBLEM — R07.2 PRECORDIAL CHEST PAIN: Status: ACTIVE | Noted: 2024-07-24

## 2024-07-24 LAB
ALBUMIN SERPL-MCNC: 4.3 GM/DL (ref 3.4–5)
ALP BLD-CCNC: 59 IU/L (ref 40–128)
ALT SERPL-CCNC: 7 U/L (ref 10–40)
ANION GAP SERPL CALCULATED.3IONS-SCNC: 9 MMOL/L (ref 7–16)
AST SERPL-CCNC: 16 IU/L (ref 15–37)
BASOPHILS ABSOLUTE: 0 K/CU MM
BASOPHILS RELATIVE PERCENT: 0.9 % (ref 0–1)
BILIRUB SERPL-MCNC: 0.6 MG/DL (ref 0–1)
BUN SERPL-MCNC: 20 MG/DL (ref 6–23)
CALCIUM SERPL-MCNC: 9.3 MG/DL (ref 8.3–10.6)
CHLORIDE BLD-SCNC: 105 MMOL/L (ref 99–110)
CHOLEST SERPL-MCNC: 113 MG/DL
CO2: 28 MMOL/L (ref 21–32)
CREAT SERPL-MCNC: 1.1 MG/DL (ref 0.6–1.1)
DIFFERENTIAL TYPE: ABNORMAL
ECHO BSA: 1.74 M2
EKG ATRIAL RATE: 62 BPM
EKG DIAGNOSIS: NORMAL
EKG P AXIS: 61 DEGREES
EKG P-R INTERVAL: 202 MS
EKG Q-T INTERVAL: 392 MS
EKG QRS DURATION: 102 MS
EKG QTC CALCULATION (BAZETT): 397 MS
EKG R AXIS: -59 DEGREES
EKG T AXIS: 51 DEGREES
EKG VENTRICULAR RATE: 62 BPM
EOSINOPHILS ABSOLUTE: 0.1 K/CU MM
EOSINOPHILS RELATIVE PERCENT: 2.6 % (ref 0–3)
ESTIMATED AVERAGE GLUCOSE: 126 MG/DL
GFR, ESTIMATED: 52 ML/MIN/1.73M2
GLUCOSE SERPL-MCNC: 89 MG/DL (ref 70–99)
HBA1C MFR BLD: 6 % (ref 4.2–6.3)
HCT VFR BLD CALC: 36.5 % (ref 37–47)
HDLC SERPL-MCNC: 49 MG/DL
HEMOGLOBIN: 11.9 GM/DL (ref 12.5–16)
IMMATURE NEUTROPHIL %: 0.2 % (ref 0–0.43)
LDLC SERPL CALC-MCNC: 37 MG/DL
LYMPHOCYTES ABSOLUTE: 1 K/CU MM
LYMPHOCYTES RELATIVE PERCENT: 22.3 % (ref 24–44)
MCH RBC QN AUTO: 30.4 PG (ref 27–31)
MCHC RBC AUTO-ENTMCNC: 32.6 % (ref 32–36)
MCV RBC AUTO: 93.1 FL (ref 78–100)
MONOCYTES ABSOLUTE: 0.4 K/CU MM
MONOCYTES RELATIVE PERCENT: 8.2 % (ref 0–4)
NEUTROPHILS ABSOLUTE: 3 K/CU MM
NEUTROPHILS RELATIVE PERCENT: 65.8 % (ref 36–66)
NUC STRESS EJECTION FRACTION: 91 %
NUCLEATED RBC %: 0 %
PDW BLD-RTO: 13.2 % (ref 11.7–14.9)
PLATELET # BLD: 215 K/CU MM (ref 140–440)
PMV BLD AUTO: 9.8 FL (ref 7.5–11.1)
POTASSIUM SERPL-SCNC: 4 MMOL/L (ref 3.5–5.1)
RBC # BLD: 3.92 M/CU MM (ref 4.2–5.4)
SODIUM BLD-SCNC: 142 MMOL/L (ref 135–145)
STRESS BASELINE DIAS BP: 62 MMHG
STRESS BASELINE HR: 63 BPM
STRESS BASELINE SYS BP: 137 MMHG
STRESS ESTIMATED WORKLOAD: 1 METS
STRESS PEAK DIAS BP: 65 MMHG
STRESS PEAK SYS BP: 138 MMHG
STRESS PERCENT HR ACHIEVED: 68 %
STRESS POST PEAK HR: 99 BPM
STRESS RATE PRESSURE PRODUCT: NORMAL BPM*MMHG
STRESS TARGET HR: 145 BPM
TOTAL IMMATURE NEUTOROPHIL: 0.01 K/CU MM
TOTAL NUCLEATED RBC: 0 K/CU MM
TOTAL PROTEIN: 7 GM/DL (ref 6.4–8.2)
TRIGL SERPL-MCNC: 135 MG/DL
WBC # BLD: 4.6 K/CU MM (ref 4–10.5)

## 2024-07-24 PROCEDURE — 93016 CV STRESS TEST SUPVJ ONLY: CPT | Performed by: INTERNAL MEDICINE

## 2024-07-24 PROCEDURE — A9500 TC99M SESTAMIBI: HCPCS | Performed by: INTERNAL MEDICINE

## 2024-07-24 PROCEDURE — 6360000002 HC RX W HCPCS: Performed by: INTERNAL MEDICINE

## 2024-07-24 PROCEDURE — APPNB15 APP NON BILLABLE TIME 0-15 MINS

## 2024-07-24 PROCEDURE — 80061 LIPID PANEL: CPT

## 2024-07-24 PROCEDURE — 85025 COMPLETE CBC W/AUTO DIFF WBC: CPT

## 2024-07-24 PROCEDURE — 78452 HT MUSCLE IMAGE SPECT MULT: CPT | Performed by: INTERNAL MEDICINE

## 2024-07-24 PROCEDURE — 94761 N-INVAS EAR/PLS OXIMETRY MLT: CPT

## 2024-07-24 PROCEDURE — 36415 COLL VENOUS BLD VENIPUNCTURE: CPT

## 2024-07-24 PROCEDURE — G0378 HOSPITAL OBSERVATION PER HR: HCPCS

## 2024-07-24 PROCEDURE — 78452 HT MUSCLE IMAGE SPECT MULT: CPT

## 2024-07-24 PROCEDURE — 2580000003 HC RX 258: Performed by: PHYSICIAN ASSISTANT

## 2024-07-24 PROCEDURE — 99232 SBSQ HOSP IP/OBS MODERATE 35: CPT | Performed by: INTERNAL MEDICINE

## 2024-07-24 PROCEDURE — 83036 HEMOGLOBIN GLYCOSYLATED A1C: CPT

## 2024-07-24 PROCEDURE — 93017 CV STRESS TEST TRACING ONLY: CPT

## 2024-07-24 PROCEDURE — 80053 COMPREHEN METABOLIC PANEL: CPT

## 2024-07-24 PROCEDURE — 93018 CV STRESS TEST I&R ONLY: CPT | Performed by: INTERNAL MEDICINE

## 2024-07-24 PROCEDURE — 3430000000 HC RX DIAGNOSTIC RADIOPHARMACEUTICAL: Performed by: INTERNAL MEDICINE

## 2024-07-24 PROCEDURE — 93010 ELECTROCARDIOGRAM REPORT: CPT | Performed by: INTERNAL MEDICINE

## 2024-07-24 RX ORDER — TETRAKIS(2-METHOXYISOBUTYLISOCYANIDE)COPPER(I) TETRAFLUOROBORATE 1 MG/ML
32.2 INJECTION, POWDER, LYOPHILIZED, FOR SOLUTION INTRAVENOUS
Status: COMPLETED | OUTPATIENT
Start: 2024-07-24 | End: 2024-07-24

## 2024-07-24 RX ORDER — TETRAKIS(2-METHOXYISOBUTYLISOCYANIDE)COPPER(I) TETRAFLUOROBORATE 1 MG/ML
10.9 INJECTION, POWDER, LYOPHILIZED, FOR SOLUTION INTRAVENOUS
Status: COMPLETED | OUTPATIENT
Start: 2024-07-24 | End: 2024-07-24

## 2024-07-24 RX ORDER — AMINOPHYLLINE 25 MG/ML
75 INJECTION, SOLUTION INTRAVENOUS ONCE
Status: COMPLETED | OUTPATIENT
Start: 2024-07-24 | End: 2024-07-24

## 2024-07-24 RX ORDER — REGADENOSON 0.08 MG/ML
0.4 INJECTION, SOLUTION INTRAVENOUS
Status: COMPLETED | OUTPATIENT
Start: 2024-07-24 | End: 2024-07-24

## 2024-07-24 RX ADMIN — KIT FOR THE PREPARATION OF TECHNETIUM TC99M SESTAMIBI 10.9 MILLICURIE: 1 INJECTION, POWDER, LYOPHILIZED, FOR SOLUTION PARENTERAL at 08:05

## 2024-07-24 RX ADMIN — SODIUM CHLORIDE, PRESERVATIVE FREE 5 ML: 5 INJECTION INTRAVENOUS at 08:02

## 2024-07-24 RX ADMIN — AMINOPHYLLINE 75 MG: 25 INJECTION, SOLUTION INTRAVENOUS at 10:33

## 2024-07-24 RX ADMIN — REGADENOSON 0.4 MG: 0.08 INJECTION, SOLUTION INTRAVENOUS at 10:30

## 2024-07-24 RX ADMIN — KIT FOR THE PREPARATION OF TECHNETIUM TC99M SESTAMIBI 32.2 MILLICURIE: 1 INJECTION, POWDER, LYOPHILIZED, FOR SOLUTION PARENTERAL at 10:30

## 2024-07-24 NOTE — PROGRESS NOTES
4 Eyes Skin Assessment     NAME:  Francy Yañez  YOB: 1950  MEDICAL RECORD NUMBER:  [id number]    The patient is being assessed for  Admission    I agree that at least one RN has performed a thorough Head to Toe Skin Assessment on the patient. ALL assessment sites listed below have been assessed.      Areas assessed by both nurses:    Head, Face, Ears, Shoulders, Back, Chest, Arms, Elbows, Hands, Sacrum. Buttock, Coccyx, Ischium, Legs. Feet and Heels, and Under Medical Devices         Does the Patient have a Wound? No noted wound(s)       Sim Prevention initiated by RN: Yes  Wound Care Orders initiated by RN: No    Pressure Injury (Stage 3,4, Unstageable, DTI, NWPT, and Complex wounds) if present, place Wound referral order by RN under : No    New Ostomies, if present place, Ostomy referral order under : No     Nurse 1 eSignature: Electronically signed by SERENE ZIMMERMAN RN on 7/23/24 at 2:41 PM EDT    **SHARE this note so that the co-signing nurse can place an eSignature**    Nurse 2 eSignature: {Esignature:[id number]}    
I have NOT personally seen and examined the patient independently. Reviewed and discussed note as documented by CHANTE.  I agree with the assessment and plan.       Awaiting stress test.   
I personally saw the patient and have reviewed all lab and imaging. I discussed the patient with the CHANTE and was available for questions and consultation as needed.     History:   Patient has been having chest pain or the last month. Has been worsening yesterday. Left sided pressure and radiates down left arm. Usually gets better with rest and aspirin but this time symptoms did not improve and chest pain was more intense. Felt similar to chest pain she has had in the past.     Exam:   No acute distress  S1s2 rrr  Cta b/l bs+  Abd soft nt nd  No pitting edema in LE    MDM:   ***      Justyna Rivera MD  7/23/24    
sodium chloride flush  5-40 mL IntraVENous 2 times per day    [Held by provider] enoxaparin  40 mg SubCUTAneous Daily    losartan  50 mg Oral Daily    And    hydroCHLOROthiazide  12.5 mg Oral Daily      sodium chloride       sodium chloride flush, sodium chloride, ondansetron **OR** ondansetron, acetaminophen **OR** acetaminophen, polyethylene glycol, morphine    Lab Data:  CBC:   Recent Labs     24  1014 24  0755   WBC 4.6 4.6   HGB 11.9* 11.9*   HCT 35.7* 36.5*   MCV 93.2 93.1    215     BMP:   Recent Labs     24  1014 24  0755    142   K 3.9 4.0    105   CO2 26 28   BUN 17 20   CREATININE 1.1 1.1     LIVER PROFILE:   Recent Labs     24  1014 24  0755   AST 15 16   ALT 9* 7*   LIPASE 30  --    BILITOT 0.5 0.6   ALKPHOS 57 59     PT/INR: No results for input(s): \"PROTIME\", \"INR\" in the last 72 hours.  APTT: No results for input(s): \"APTT\" in the last 72 hours.  BNP:  No results for input(s): \"BNP\" in the last 72 hours.  TROPONIN: No results for input(s): \"TROPONINT\" in the last 72 hours.  Labs, consult, tests reviewed          Ramon Naranjo PA-C, 2024 2:24 PM     I have seen ,spoken to  and examined this patient personally, independently of the CHANTE. I have reviewed the hospital care given to date and reviewed all pertinent labs and imaging.I have spoken with patient, nursing staff and provided written and verbal instructions .The above note has been reviewed     CARDIOLOGY ATTENDING ADDENDUM    HPI:  I have reviewed the above HPI  And agree with above     Pulse Range: Pulse  Av.5  Min: 54  Max: 64    Blood Presuure Range:  Systolic (24hrs), Av , Min:106 , Max:143   ; Diastolic (24hrs), Av, Min:56, Max:62      Pulse ox Range: SpO2  Av.3 %  Min: 98 %  Max: 99 %    24hr I & O:  No intake or output data in the 24 hours ending 24 1430      BP (!) 143/60   Pulse 64   Temp 97.2 °F (36.2 °C) (Oral)   Resp 17   Ht 1.702 m (5' 7\")

## 2024-07-24 NOTE — DISCHARGE SUMMARY
tablet by mouth daily     atorvastatin 80 MG tablet  Commonly known as: LIPITOR  Take 1 tablet by mouth nightly     carvedilol 6.25 MG tablet  Commonly known as: COREG     ezetimibe 10 MG tablet  Commonly known as: ZETIA     losartan-hydroCHLOROthiazide 100-25 MG per tablet  Commonly known as: HYZAAR     nitroGLYCERIN 0.4 MG SL tablet  Commonly known as: NITROSTAT  up to max of 3 total doses. If no relief after 1 dose, call 911.     ticagrelor 90 MG Tabs tablet  Commonly known as: BRILINTA  Take 1 tablet by mouth 2 times daily             Objective Findings at Discharge:   BP (!) 143/60   Pulse 64   Temp 97.2 °F (36.2 °C) (Oral)   Resp 17   Ht 1.702 m (5' 7\")   Wt 64 kg (141 lb 1.5 oz)   SpO2 99%   BMI 22.10 kg/m²       Physical Exam:   General: NAD  Eyes: EOMI  ENT: neck supple  Cardiovascular: Regular rate.  Respiratory: Clear to auscultation bilaterally  Gastrointestinal: Abdomen soft, non tender  Genitourinary: no suprapubic tenderness  Musculoskeletal: No edema  Skin: warm, dry  Neuro: Alert.  Psych: Mood appropriate.         Labs and Imaging   XR CHEST PORTABLE    Result Date: 7/23/2024  EXAM: XR chest portable DATE: 7/23/2024 12:03 PM HISTORY: Chest pain COMPARISON: September 20, 2022 TECHNIQUE:  Single AP view of the chest. FINDINGS: The lungs are clear. A small calcified granuloma is present in the right midlung field, as seen on the previous exam. No pleural effusion or pneumothorax. The cardiomediastinal silhouette is unremarkable. No acute osseous or soft tissue abnormality.     1.  No acute cardiopulmonary process. Electronically signed by Hiral Doss      CBC:   Recent Labs     07/23/24  1014 07/24/24  0755   WBC 4.6 4.6   HGB 11.9* 11.9*    215     BMP:    Recent Labs     07/23/24  1014 07/24/24  0755    142   K 3.9 4.0    105   CO2 26 28   BUN 17 20   CREATININE 1.1 1.1   GLUCOSE 106* 89     Hepatic:   Recent Labs     07/23/24  1014 07/24/24  0755   AST 15 16   ALT 9*

## 2024-07-24 NOTE — PLAN OF CARE
Problem: Discharge Planning  Goal: Discharge to home or other facility with appropriate resources  7/23/2024 2212 by Jazmín Mcdaniels RN  Outcome: Progressing  Flowsheets (Taken 7/23/2024 1441 by Johanny Banerjee RN)  Discharge to home or other facility with appropriate resources:   Identify barriers to discharge with patient and caregiver   Arrange for needed discharge resources and transportation as appropriate   Identify discharge learning needs (meds, wound care, etc)   Refer to discharge planning if patient needs post-hospital services based on physician order or complex needs related to functional status, cognitive ability or social support system  7/23/2024 1431 by Johanny Banerjee RN  Outcome: Progressing  Flowsheets (Taken 7/23/2024 1429)  Discharge to home or other facility with appropriate resources:   Identify barriers to discharge with patient and caregiver   Arrange for needed discharge resources and transportation as appropriate   Refer to discharge planning if patient needs post-hospital services based on physician order or complex needs related to functional status, cognitive ability or social support system   Identify discharge learning needs (meds, wound care, etc)     Problem: Pain  Goal: Verbalizes/displays adequate comfort level or baseline comfort level  7/23/2024 2212 by Jazmín Mcdaniels, RN  Outcome: Progressing  7/23/2024 1431 by Johanny Banerjee RN  Outcome: Progressing  Flowsheets (Taken 7/23/2024 1422)  Verbalizes/displays adequate comfort level or baseline comfort level:   Encourage patient to monitor pain and request assistance   Assess pain using appropriate pain scale   Administer analgesics based on type and severity of pain and evaluate response     Problem: Safety - Adult  Goal: Free from fall injury  7/23/2024 2212 by Jazmín Mcdaniels RN  Outcome: Progressing  Flowsheets (Taken 7/23/2024 1432 by Johanny Banerjee RN)  Free From Fall Injury: Instruct family/caregiver

## 2024-11-11 ENCOUNTER — APPOINTMENT (OUTPATIENT)
Dept: GENERAL RADIOLOGY | Age: 74
End: 2024-11-11
Payer: MEDICARE

## 2024-11-11 ENCOUNTER — HOSPITAL ENCOUNTER (EMERGENCY)
Age: 74
Discharge: HOME | End: 2024-11-11
Attending: STUDENT IN AN ORGANIZED HEALTH CARE EDUCATION/TRAINING PROGRAM
Payer: MEDICARE

## 2024-11-11 VITALS
HEART RATE: 70 BPM | SYSTOLIC BLOOD PRESSURE: 144 MMHG | TEMPERATURE: 97.7 F | DIASTOLIC BLOOD PRESSURE: 68 MMHG | RESPIRATION RATE: 14 BRPM | OXYGEN SATURATION: 100 %

## 2024-11-11 DIAGNOSIS — R07.9 CHEST PAIN, UNSPECIFIED TYPE: Primary | ICD-10-CM

## 2024-11-11 LAB
ALBUMIN SERPL-MCNC: 4 G/DL (ref 3.4–5)
ALBUMIN/GLOB SERPL: 1.5 {RATIO} (ref 1.1–2.2)
ALP SERPL-CCNC: 59 U/L (ref 40–129)
ALT SERPL-CCNC: 6 U/L (ref 10–40)
ANION GAP SERPL CALCULATED.3IONS-SCNC: 14 MMOL/L (ref 9–17)
AST SERPL-CCNC: 31 U/L (ref 15–37)
BASOPHILS # BLD: 0.03 K/UL
BASOPHILS NFR BLD: 1 % (ref 0–1)
BILIRUB SERPL-MCNC: 0.3 MG/DL (ref 0–1)
BUN SERPL-MCNC: 16 MG/DL (ref 7–20)
CALCIUM SERPL-MCNC: 9.5 MG/DL (ref 8.3–10.6)
CHLORIDE SERPL-SCNC: 103 MMOL/L (ref 99–110)
CO2 SERPL-SCNC: 24 MMOL/L (ref 21–32)
CREAT SERPL-MCNC: 1.2 MG/DL (ref 0.6–1.2)
EOSINOPHIL # BLD: 0.14 K/UL
EOSINOPHILS RELATIVE PERCENT: 3 % (ref 0–3)
ERYTHROCYTE [DISTWIDTH] IN BLOOD BY AUTOMATED COUNT: 13.2 % (ref 11.7–14.9)
GFR, ESTIMATED: 45 ML/MIN/1.73M2
GLUCOSE SERPL-MCNC: 136 MG/DL (ref 74–99)
HCT VFR BLD AUTO: 38.1 % (ref 37–47)
HGB BLD-MCNC: 12.5 G/DL (ref 12.5–16)
IMM GRANULOCYTES # BLD AUTO: 0.01 K/UL
IMM GRANULOCYTES NFR BLD: 0 %
LYMPHOCYTES NFR BLD: 1.16 K/UL
LYMPHOCYTES RELATIVE PERCENT: 28 % (ref 24–44)
MCH RBC QN AUTO: 30 PG (ref 27–31)
MCHC RBC AUTO-ENTMCNC: 32.8 G/DL (ref 32–36)
MCV RBC AUTO: 91.4 FL (ref 78–100)
MONOCYTES NFR BLD: 0.3 K/UL
MONOCYTES NFR BLD: 7 % (ref 0–4)
NEUTROPHILS NFR BLD: 61 % (ref 36–66)
NEUTS SEG NFR BLD: 2.5 K/UL
PLATELET # BLD AUTO: 262 K/UL (ref 140–440)
PMV BLD AUTO: 10.6 FL (ref 7.5–11.1)
POTASSIUM SERPL-SCNC: 4.5 MMOL/L (ref 3.5–5.1)
PROT SERPL-MCNC: 6.7 G/DL (ref 6.4–8.2)
RBC # BLD AUTO: 4.17 M/UL (ref 4.2–5.4)
SODIUM SERPL-SCNC: 142 MMOL/L (ref 136–145)
TROPONIN I SERPL HS-MCNC: 11 NG/L (ref 0–14)
TROPONIN I SERPL HS-MCNC: 9 NG/L (ref 0–14)
WBC OTHER # BLD: 4.1 K/UL (ref 4–10.5)

## 2024-11-11 PROCEDURE — 71045 X-RAY EXAM CHEST 1 VIEW: CPT

## 2024-11-11 PROCEDURE — 80053 COMPREHEN METABOLIC PANEL: CPT

## 2024-11-11 PROCEDURE — 84484 ASSAY OF TROPONIN QUANT: CPT

## 2024-11-11 PROCEDURE — 85025 COMPLETE CBC W/AUTO DIFF WBC: CPT

## 2024-11-11 PROCEDURE — 99285 EMERGENCY DEPT VISIT HI MDM: CPT

## 2024-11-11 PROCEDURE — 93005 ELECTROCARDIOGRAM TRACING: CPT | Performed by: STUDENT IN AN ORGANIZED HEALTH CARE EDUCATION/TRAINING PROGRAM

## 2024-11-11 NOTE — ED PROVIDER NOTES
pitting edema, erythema, or calf tenderness.      Initial exam very reassuring.  Patient looks very comfortable    Initial EKG seen and interpreted by me shows normal sinus rhythm with rate of 75, left axis, poor R wave progression, no ST segment elevation greater than 1 mm contiguous leads, intervals within normal limits.    Heart score 6.    Chart view shows most recent heart cath from September 2022 that revealed patent mid LAD stents and otherwise unremarkable vessels.  Patient also had a recent hospitalization for chest pain in July 2024 where she had a normal nuclear stress test.    Laboratory workup reviewed and reassuring.  No concerning leukocytosis, anemia, or thrombocytopenia.  No concerning electrolyte abnormalities.  Initial troponin and repeat troponin not elevated.    Chest x-ray reviewed without any acute findings.    Patient reevaluated and she is continuing to rest very comfortably in bed in no acute distress.  Patient has a history of underlying coronary artery disease but her presentation the emergency department is inconsistent with an acute coronary event.  The workup has been negative.  Patient and her recent hospitalization this year just a few months ago with a negative stress test.  Patient is reliable to follow-up outpatient with cardiology.    I discussed patient's underlying risk  as well as the negative workup and both her and her  are comfortable going home and following up outpatient.      Diagnosis and Disposition     CLINICAL IMPRESSION:  1. Chest pain, unspecified type        Disposition: Discharge    Patient condition at time of disposition: Good    Disposition medications (if applicable):  New Prescriptions    No medications on file       DISCHARGE NOTE:   Pt has been reexamined. Patient has no new complaints, changes, or physical findings.  Care plan outlined and precautions discussed.  Results were reviewed with the patient. All medications were reviewed with the

## 2024-11-11 NOTE — ED TRIAGE NOTES
Patient to ED with complaints of chest pain every morning for months. States she is being seen today because she thinks the pain might be getting worse.

## 2024-11-11 NOTE — DISCHARGE INSTRUCTIONS
Call and schedule follow-up appointment with your cardiologist as soon as possible.  Return to emergency department if you develop new or worsening symptoms.  Also recommend outpatient follow-up with your PCP.

## 2024-11-13 LAB
EKG ATRIAL RATE: 75 BPM
EKG DIAGNOSIS: NORMAL
EKG P AXIS: 66 DEGREES
EKG P-R INTERVAL: 192 MS
EKG Q-T INTERVAL: 380 MS
EKG QRS DURATION: 94 MS
EKG QTC CALCULATION (BAZETT): 424 MS
EKG R AXIS: -60 DEGREES
EKG T AXIS: 55 DEGREES
EKG VENTRICULAR RATE: 75 BPM

## 2024-11-13 PROCEDURE — 93010 ELECTROCARDIOGRAM REPORT: CPT | Performed by: INTERNAL MEDICINE

## 2025-04-30 ENCOUNTER — HOSPITAL ENCOUNTER (EMERGENCY)
Age: 75
Discharge: ANOTHER HOSPITAL | End: 2025-04-30
Attending: EMERGENCY MEDICINE
Payer: MEDICARE

## 2025-04-30 ENCOUNTER — APPOINTMENT (OUTPATIENT)
Dept: GENERAL RADIOLOGY | Age: 75
End: 2025-04-30
Attending: EMERGENCY MEDICINE
Payer: MEDICARE

## 2025-04-30 ENCOUNTER — HOSPITAL ENCOUNTER (OUTPATIENT)
Age: 75
Setting detail: OBSERVATION
LOS: 1 days | Discharge: HOME | End: 2025-05-02
Attending: HOSPITALIST
Payer: MEDICARE

## 2025-04-30 ENCOUNTER — APPOINTMENT (OUTPATIENT)
Dept: CT IMAGING | Age: 75
End: 2025-04-30
Attending: EMERGENCY MEDICINE
Payer: MEDICARE

## 2025-04-30 VITALS
HEIGHT: 67 IN | TEMPERATURE: 97.5 F | BODY MASS INDEX: 20.99 KG/M2 | HEART RATE: 83 BPM | WEIGHT: 133.7 LBS | OXYGEN SATURATION: 97 % | RESPIRATION RATE: 13 BRPM | SYSTOLIC BLOOD PRESSURE: 115 MMHG | DIASTOLIC BLOOD PRESSURE: 105 MMHG

## 2025-04-30 DIAGNOSIS — R55 SYNCOPE, UNSPECIFIED SYNCOPE TYPE: ICD-10-CM

## 2025-04-30 DIAGNOSIS — R09.89 SUSPECTED CEREBROVASCULAR ACCIDENT (CVA): ICD-10-CM

## 2025-04-30 DIAGNOSIS — R51.9 NONINTRACTABLE HEADACHE, UNSPECIFIED CHRONICITY PATTERN, UNSPECIFIED HEADACHE TYPE: ICD-10-CM

## 2025-04-30 DIAGNOSIS — R07.89 OTHER CHEST PAIN: Primary | ICD-10-CM

## 2025-04-30 DIAGNOSIS — R41.82 ALTERED MENTAL STATUS, UNSPECIFIED ALTERED MENTAL STATUS TYPE: Primary | ICD-10-CM

## 2025-04-30 DIAGNOSIS — R40.2421 GLASGOW COMA SCALE TOTAL SCORE 9-12, IN THE FIELD (EMT OR AMBULANCE): ICD-10-CM

## 2025-04-30 LAB
ALBUMIN SERPL-MCNC: 4.2 G/DL (ref 3.4–5)
ALBUMIN/GLOB SERPL: 1.8 {RATIO}
ALP SERPL-CCNC: 50 U/L (ref 40–129)
ALT SERPL-CCNC: 8 U/L (ref 10–40)
ANION GAP SERPL CALCULATED.3IONS-SCNC: 17 MMOL/L (ref 9–17)
AST SERPL-CCNC: 18 U/L (ref 15–37)
BACTERIA URNS QL MICRO: ABNORMAL
BASOPHILS # BLD: 0.03 K/UL
BASOPHILS NFR BLD: 0 % (ref 0–1)
BILIRUB SERPL-MCNC: 0.7 MG/DL (ref 0–1)
BILIRUB UR QL STRIP: ABNORMAL
BNP SERPL-MCNC: 336 PG/ML (ref 0–450)
BUN SERPL-MCNC: 20 MG/DL (ref 7–20)
CALCIUM SERPL-MCNC: 9.6 MG/DL (ref 8.3–10.6)
CHLORIDE SERPL-SCNC: 96 MMOL/L (ref 99–110)
CHP ED QC CHECK: YES
CLARITY UR: CLEAR
CO2 SERPL-SCNC: 23 MMOL/L (ref 21–32)
COLOR UR: YELLOW
CREAT SERPL-MCNC: 1.3 MG/DL (ref 0.6–1.2)
EKG ATRIAL RATE: 57 BPM
EKG DIAGNOSIS: NORMAL
EKG P AXIS: 61 DEGREES
EKG P-R INTERVAL: 212 MS
EKG Q-T INTERVAL: 472 MS
EKG QRS DURATION: 106 MS
EKG QTC CALCULATION (BAZETT): 459 MS
EKG R AXIS: -57 DEGREES
EKG T AXIS: 36 DEGREES
EKG VENTRICULAR RATE: 57 BPM
EOSINOPHIL # BLD: 0.06 K/UL
EOSINOPHILS RELATIVE PERCENT: 1 % (ref 0–3)
EPI CELLS #/AREA URNS HPF: ABNORMAL /HPF
ERYTHROCYTE [DISTWIDTH] IN BLOOD BY AUTOMATED COUNT: 13.3 % (ref 11.7–14.9)
GFR, ESTIMATED: 43 ML/MIN/1.73M2
GLUCOSE BLD-MCNC: 156 MG/DL (ref 74–99)
GLUCOSE SERPL-MCNC: 167 MG/DL (ref 74–99)
GLUCOSE UR STRIP-MCNC: NEGATIVE MG/DL
HCO3 VENOUS: 29.2 MMOL/L (ref 22–29)
HCT VFR BLD AUTO: 36.6 % (ref 37–47)
HGB BLD-MCNC: 12.2 G/DL (ref 12.5–16)
HGB UR QL STRIP.AUTO: NEGATIVE
IMM GRANULOCYTES # BLD AUTO: 0.02 K/UL
IMM GRANULOCYTES NFR BLD: 0 %
INR PPP: 0.9
KETONES UR STRIP-MCNC: ABNORMAL MG/DL
LACTATE BLDV-SCNC: 1.7 MMOL/L (ref 0.4–2)
LEUKOCYTE ESTERASE UR QL STRIP: NEGATIVE
LYMPHOCYTES NFR BLD: 0.77 K/UL
LYMPHOCYTES RELATIVE PERCENT: 11 % (ref 24–44)
MAGNESIUM SERPL-MCNC: 1.5 MG/DL (ref 1.8–2.4)
MCH RBC QN AUTO: 30.7 PG (ref 27–31)
MCHC RBC AUTO-ENTMCNC: 33.3 G/DL (ref 32–36)
MCV RBC AUTO: 92.2 FL (ref 78–100)
MONOCYTES NFR BLD: 0.36 K/UL
MONOCYTES NFR BLD: 5 % (ref 0–5)
NEUTROPHILS NFR BLD: 83 % (ref 36–66)
NEUTS SEG NFR BLD: 5.83 K/UL
NITRITE UR QL STRIP: NEGATIVE
O2 SAT, VEN: 74.1 % (ref 34–95)
PARTIAL THROMBOPLASTIN TIME: 23.8 SEC (ref 25.1–37.1)
PCO2 VENOUS: 49 MM HG (ref 41–51)
PH UR STRIP: 6 [PH] (ref 5–8)
PH VENOUS: 7.38 (ref 7.32–7.43)
PLATELET # BLD AUTO: 242 K/UL (ref 140–440)
PMV BLD AUTO: 9.7 FL (ref 7.5–11.1)
PO2 VENOUS: 40.6 MM HG (ref 28–48)
POSITIVE BASE EXCESS, VEN: 3.3 MMOL/L (ref 0–3)
POTASSIUM SERPL-SCNC: 4.3 MMOL/L (ref 3.5–5.1)
PROT SERPL-MCNC: 6.5 G/DL (ref 6.4–8.2)
PROT UR STRIP-MCNC: ABNORMAL MG/DL
PROTHROMBIN TIME: 12.8 SEC (ref 11.7–14.5)
RBC # BLD AUTO: 3.97 M/UL (ref 4.2–5.4)
RBC #/AREA URNS HPF: ABNORMAL /HPF
SODIUM SERPL-SCNC: 135 MMOL/L (ref 136–145)
SP GR UR STRIP: 1.02 (ref 1–1.03)
TCO2 CALC VENOUS: 31 MMOL/L (ref 21–32)
TROPONIN I SERPL HS-MCNC: 11 NG/L (ref 0–14)
TROPONIN I SERPL HS-MCNC: 11 NG/L (ref 0–14)
TSH SERPL DL<=0.05 MIU/L-ACNC: 3.76 UIU/ML (ref 0.27–4.2)
UROBILINOGEN UR STRIP-ACNC: 0.2 EU/DL (ref 0–1)
WBC #/AREA URNS HPF: ABNORMAL /HPF
WBC OTHER # BLD: 7.1 K/UL (ref 4–10.5)

## 2025-04-30 PROCEDURE — 85730 THROMBOPLASTIN TIME PARTIAL: CPT

## 2025-04-30 PROCEDURE — 82962 GLUCOSE BLOOD TEST: CPT

## 2025-04-30 PROCEDURE — 2580000003 HC RX 258: Performed by: EMERGENCY MEDICINE

## 2025-04-30 PROCEDURE — G0378 HOSPITAL OBSERVATION PER HR: HCPCS

## 2025-04-30 PROCEDURE — 71045 X-RAY EXAM CHEST 1 VIEW: CPT

## 2025-04-30 PROCEDURE — 6360000002 HC RX W HCPCS: Performed by: STUDENT IN AN ORGANIZED HEALTH CARE EDUCATION/TRAINING PROGRAM

## 2025-04-30 PROCEDURE — 6370000000 HC RX 637 (ALT 250 FOR IP)

## 2025-04-30 PROCEDURE — 93010 ELECTROCARDIOGRAM REPORT: CPT | Performed by: INTERNAL MEDICINE

## 2025-04-30 PROCEDURE — 80053 COMPREHEN METABOLIC PANEL: CPT

## 2025-04-30 PROCEDURE — 2500000003 HC RX 250 WO HCPCS: Performed by: STUDENT IN AN ORGANIZED HEALTH CARE EDUCATION/TRAINING PROGRAM

## 2025-04-30 PROCEDURE — 85610 PROTHROMBIN TIME: CPT

## 2025-04-30 PROCEDURE — 6370000000 HC RX 637 (ALT 250 FOR IP): Performed by: STUDENT IN AN ORGANIZED HEALTH CARE EDUCATION/TRAINING PROGRAM

## 2025-04-30 PROCEDURE — 96360 HYDRATION IV INFUSION INIT: CPT

## 2025-04-30 PROCEDURE — 82803 BLOOD GASES ANY COMBINATION: CPT

## 2025-04-30 PROCEDURE — 81001 URINALYSIS AUTO W/SCOPE: CPT

## 2025-04-30 PROCEDURE — 83605 ASSAY OF LACTIC ACID: CPT

## 2025-04-30 PROCEDURE — 85025 COMPLETE CBC W/AUTO DIFF WBC: CPT

## 2025-04-30 PROCEDURE — 84484 ASSAY OF TROPONIN QUANT: CPT

## 2025-04-30 PROCEDURE — 83880 ASSAY OF NATRIURETIC PEPTIDE: CPT

## 2025-04-30 PROCEDURE — 93005 ELECTROCARDIOGRAM TRACING: CPT | Performed by: EMERGENCY MEDICINE

## 2025-04-30 PROCEDURE — 83735 ASSAY OF MAGNESIUM: CPT

## 2025-04-30 PROCEDURE — 84443 ASSAY THYROID STIM HORMONE: CPT

## 2025-04-30 PROCEDURE — 99285 EMERGENCY DEPT VISIT HI MDM: CPT

## 2025-04-30 PROCEDURE — 96372 THER/PROPH/DIAG INJ SC/IM: CPT

## 2025-04-30 PROCEDURE — 70450 CT HEAD/BRAIN W/O DYE: CPT

## 2025-04-30 RX ORDER — ONDANSETRON 2 MG/ML
4 INJECTION INTRAMUSCULAR; INTRAVENOUS EVERY 6 HOURS PRN
Status: DISCONTINUED | OUTPATIENT
Start: 2025-04-30 | End: 2025-05-02 | Stop reason: HOSPADM

## 2025-04-30 RX ORDER — LOSARTAN POTASSIUM AND HYDROCHLOROTHIAZIDE 25; 100 MG/1; MG/1
0.5 TABLET ORAL DAILY
Status: DISCONTINUED | OUTPATIENT
Start: 2025-04-30 | End: 2025-04-30

## 2025-04-30 RX ORDER — CARVEDILOL 6.25 MG/1
6.25 TABLET ORAL 2 TIMES DAILY WITH MEALS
Status: DISCONTINUED | OUTPATIENT
Start: 2025-04-30 | End: 2025-05-02 | Stop reason: HOSPADM

## 2025-04-30 RX ORDER — HYDROCHLOROTHIAZIDE 12.5 MG/1
12.5 TABLET ORAL DAILY
Status: DISCONTINUED | OUTPATIENT
Start: 2025-04-30 | End: 2025-05-02 | Stop reason: HOSPADM

## 2025-04-30 RX ORDER — SODIUM CHLORIDE 0.9 % (FLUSH) 0.9 %
5-40 SYRINGE (ML) INJECTION EVERY 12 HOURS SCHEDULED
Status: DISCONTINUED | OUTPATIENT
Start: 2025-04-30 | End: 2025-05-02 | Stop reason: HOSPADM

## 2025-04-30 RX ORDER — ASPIRIN 300 MG/1
300 SUPPOSITORY RECTAL DAILY
Status: DISCONTINUED | OUTPATIENT
Start: 2025-04-30 | End: 2025-05-02 | Stop reason: HOSPADM

## 2025-04-30 RX ORDER — POLYETHYLENE GLYCOL 3350 17 G/17G
17 POWDER, FOR SOLUTION ORAL DAILY PRN
Status: DISCONTINUED | OUTPATIENT
Start: 2025-04-30 | End: 2025-05-02

## 2025-04-30 RX ORDER — ASPIRIN 81 MG/1
81 TABLET, CHEWABLE ORAL DAILY
Status: DISCONTINUED | OUTPATIENT
Start: 2025-04-30 | End: 2025-05-02 | Stop reason: HOSPADM

## 2025-04-30 RX ORDER — 0.9 % SODIUM CHLORIDE 0.9 %
1000 INTRAVENOUS SOLUTION INTRAVENOUS ONCE
Status: COMPLETED | OUTPATIENT
Start: 2025-04-30 | End: 2025-04-30

## 2025-04-30 RX ORDER — ONDANSETRON 4 MG/1
4 TABLET, ORALLY DISINTEGRATING ORAL EVERY 8 HOURS PRN
Status: DISCONTINUED | OUTPATIENT
Start: 2025-04-30 | End: 2025-05-02 | Stop reason: HOSPADM

## 2025-04-30 RX ORDER — ATORVASTATIN CALCIUM 40 MG/1
80 TABLET, FILM COATED ORAL NIGHTLY
Status: DISCONTINUED | OUTPATIENT
Start: 2025-04-30 | End: 2025-05-02 | Stop reason: HOSPADM

## 2025-04-30 RX ORDER — EZETIMIBE 10 MG/1
10 TABLET ORAL DAILY
Status: DISCONTINUED | OUTPATIENT
Start: 2025-04-30 | End: 2025-05-02 | Stop reason: HOSPADM

## 2025-04-30 RX ORDER — LOSARTAN POTASSIUM 25 MG/1
50 TABLET ORAL DAILY
Status: DISCONTINUED | OUTPATIENT
Start: 2025-04-30 | End: 2025-05-02 | Stop reason: HOSPADM

## 2025-04-30 RX ORDER — SODIUM CHLORIDE 0.9 % (FLUSH) 0.9 %
5-40 SYRINGE (ML) INJECTION PRN
Status: DISCONTINUED | OUTPATIENT
Start: 2025-04-30 | End: 2025-05-02 | Stop reason: HOSPADM

## 2025-04-30 RX ORDER — SODIUM CHLORIDE 9 MG/ML
INJECTION, SOLUTION INTRAVENOUS PRN
Status: DISCONTINUED | OUTPATIENT
Start: 2025-04-30 | End: 2025-05-02 | Stop reason: HOSPADM

## 2025-04-30 RX ORDER — ENOXAPARIN SODIUM 100 MG/ML
40 INJECTION SUBCUTANEOUS EVERY EVENING
Status: DISCONTINUED | OUTPATIENT
Start: 2025-04-30 | End: 2025-05-02 | Stop reason: HOSPADM

## 2025-04-30 RX ADMIN — LOSARTAN POTASSIUM 50 MG: 25 TABLET, FILM COATED ORAL at 23:38

## 2025-04-30 RX ADMIN — ENOXAPARIN SODIUM 40 MG: 100 INJECTION SUBCUTANEOUS at 18:57

## 2025-04-30 RX ADMIN — Medication 10 ML: at 21:32

## 2025-04-30 RX ADMIN — EZETIMIBE 10 MG: 10 TABLET ORAL at 23:38

## 2025-04-30 RX ADMIN — HYDROCHLOROTHIAZIDE 12.5 MG: 12.5 TABLET ORAL at 23:38

## 2025-04-30 RX ADMIN — ATORVASTATIN CALCIUM 80 MG: 40 TABLET, FILM COATED ORAL at 23:38

## 2025-04-30 RX ADMIN — ASPIRIN 81 MG: 81 TABLET, CHEWABLE ORAL at 18:57

## 2025-04-30 RX ADMIN — SODIUM CHLORIDE 1000 ML: 0.9 INJECTION, SOLUTION INTRAVENOUS at 07:37

## 2025-04-30 ASSESSMENT — PAIN - FUNCTIONAL ASSESSMENT: PAIN_FUNCTIONAL_ASSESSMENT: NONE - DENIES PAIN

## 2025-04-30 ASSESSMENT — ENCOUNTER SYMPTOMS
SHORTNESS OF BREATH: 1
WHEEZING: 0
ALLERGIC/IMMUNOLOGIC NEGATIVE: 1
GASTROINTESTINAL NEGATIVE: 1
COUGH: 0
EYE DISCHARGE: 0
SORE THROAT: 0
BACK PAIN: 0
DIARRHEA: 0
EYES NEGATIVE: 1
SHORTNESS OF BREATH: 0
ABDOMINAL DISTENTION: 0
CONSTIPATION: 0

## 2025-04-30 NOTE — H&P
V2.0  History and Physical      Name:  Francy Yañez /Age/Sex: 1950  (75 y.o. female)   MRN & CSN:  [id number] & [id number] Encounter Date/Time: 2025 6:15 PM EDT   Location:  [address] [place] [address]- PCP: Juanjose Mishra MD       Hospital Day: 1    Assessment and Plan:   Francy Yañez is a 75 y.o. female with a pmh of HTN, HLD, CAD s/p PCI who presents with Altered mental status    Suspected stroke, very low concern for stroke etiology  -Presented with unresponsiveness at [facility] ED.  -LKW 3 AM today  -At [facility] ED vitals, labs and imaging unremarkable  -Concern for stroke.  Transferred to [facility] for further evaluation  -Patient at baseline on arrival to [facility], NIH 0  -Will defer MRI at this point with resolution of symptoms and patient at baseline.   - Patient will benefit from expedited discharge tomorrow    Chronic comorbidities  HTN/HLD-resume home meds  CAD s/p PCI-resume home dose of aspirin    CODE STATUS  Goals of care and advance care directives discussed with patient and  at length.  Patient has a living will which as per  states she does not wish to go undergo any heroic measures (no CPR/no intubation/no pressor support).  CODE STATUS updated in chart.       Medical Decision Making:  Discussion of patient care with other providers  Reviewed clinical lab tests if any  Reviewed radiology tests if any  Reviewed other diagnostic tests/interventions  Independent review of radiologic images if any  Microbiology cultures and other micro tests if any    Estimated time spent for medical decision-making encompassing complexity of the case, history taking, medication review, physical examination, communication with family, RN, , discussion with specialists, and ancillary staff members to provide accurate care for the patient was around 65 minutes.    The billing in this case is level 3 due to the combination of above and specifically because of complexity of.    Comment: Please note this

## 2025-04-30 NOTE — ED PROVIDER NOTES
In order to facilitate the oncoming physician I have seen this patient in triage.  I have placed basic orders and/or imaging for the oncoming physician.  In addition, I have placed medication orders in anticipation of what may be needed for this patient by the oncoming physician.  I have only seen this patient in triage to the oncoming physician will be providing the complete medical record and they will be the physician of record for the patient.  My goal has been to provide triage and any emergent needs that the patient may need prior to arrival of oncoming physician.  The patient was checked out to the oncoming physician they are stable and neurovascularly intact prior to my leaving the department.  In brief, 75-year-old female who was brought in by EMS for confusion.  The original call to EMS was because the patient could not talk but upon arrival of EMS the patient was able to talk but she was only complaining of phlegm in her throat and then the  reported that the patient was altered.  The patient is alert to person and place but not to time.  Patient states that she feels confused and she has a headache  Patient awoke with her symptoms and this would not be a stroke alert, in addition, we do not have a baseline.  Besides her confusion she does not have neurological deficits. She does convey she has a very dry throat which is making it hard to swallow due to the dryness        Hi Thao, DO  04/30/25 0641       Hi Thao, DO  04/30/25 0644    
   T Axis 36 degrees    Diagnosis       Sinus bradycardia with 1st degree AV block  Left axis deviation  Abnormal ECG  When compared with ECG of 11-NOV-2024 13:46,  Incomplete right bundle branch block is no longer present  Criteria for Anterior infarct are no longer present          Radiographs (if obtained):  [] The following radiograph was interpreted by myself in the absence of a radiologist:  [x] Radiologist's Report Reviewed:    CT Brain, CXR    EKG (if obtained): (All EKG's are interpreted by myself in the absence of a cardiologist)    12 lead EKG per my interpretation:  Sinus Bradycardia 57  Axis is   Left axis deviation  QTc is   459  There is no specific T wave changes appreciated.  There is no specific ST wave changes appreciated.    Prior EKG to compare with was not available       NIH Stroke Scale  NIH Stroke Scale Assessed: Yes  Interval: Baseline  Level of Consciousness (1a): Alert  LOC Questions (1b): Answers both correctly  LOC Commands (1c): Performs both tasks correctly  Best Gaze (2): Normal  Visual (3): No visual loss  Facial Palsy (4): Normal symmetrical movement  Motor Arm, Left (5a): No drift  Motor Arm, Right (5b): No drift  Motor Leg, Left (6a): No drift  Motor Leg, Right (6b): No drift  Limb Ataxia (7): Absent  Sensory (8): Normal  Best Language (9): No aphasia  Dysarthria (10): Normal  Extinction and Inattention (11): No abnormality  Total: 0      MDM:    Patient here complaint of confusion, headache.  Again patient apparently woke up this morning around 3 AM to go the bathroom she had a headache which she has from time to time according to .  She apparently made a weird noise in the bathroom  went to find her she was having trouble responding having trouble talking 911 was called EMS arrived and she was doing better.  Brought in for evaluation.  Upon arrival, she was seen by my colleague initially for physician in triage evaluation.  Workup performed on my exam and I got to

## 2025-04-30 NOTE — DISCHARGE INSTR - COC
Pulse 62   Temp 97.5 °F (36.4 °C)   Ht 1.702 m (5' 7\")   Wt 60.6 kg (133 lb 11.2 oz)   SpO2 97%   BMI 20.94 kg/m²     Last documented pain score (0-10 scale):    Last Weight:   Wt Readings from Last 1 Encounters:   25 60.6 kg (133 lb 11.2 oz)     Mental Status:  {IP PT MENTAL STATUS:}    IV Access:  { JOSAFAT IV ACCESS:072757487}    Nursing Mobility/ADLs:  Walking   {CHP DME ADLs:705637100}  Transfer  {CHP DME ADLs:550692276}  Bathing  {CHP DME ADLs:080299154}  Dressing  {CHP DME ADLs:537740380}  Toileting  {CHP DME ADLs:123138057}  Feeding  {CHP DME ADLs:986110849}  Med Admin  {P DME ADLs:536852154}  Med Delivery   { JOSAFAT MED Delivery:523912617}    Wound Care Documentation and Therapy:  Incision 05/10/17 Abdomen (Active)   Number of days: 2911        Elimination:  Continence:   Bowel: {YES / NO:}  Bladder: {YES / NO:}  Urinary Catheter: {Urinary Catheter:133560675}   Colostomy/Ileostomy/Ileal Conduit: {YES / NO:}       Date of Last BM: ***  No intake or output data in the 24 hours ending 25 0705  No intake/output data recorded.    Safety Concerns:     { JOSAFAT Safety Concerns:358559870}    Impairments/Disabilities:      {[facility] [place]/Disabilities:426421777}    Nutrition Therapy:  Current Nutrition Therapy:   { JOSAFAT Diet List:472950814}    Routes of Feeding: {CHP DME Other Feedings:246948310}  Liquids: {Slp liquid thickness:20682}  Daily Fluid Restriction: {CHP DME Yes amt example:179601079}  Last Modified Barium Swallow with Video (Video Swallowing Test): {Done Not Done Date:}    Treatments at the Time of Hospital Discharge:   Respiratory Treatments: ***  Oxygen Therapy:  {Therapy; copd oxygen:31178}  Ventilator:    { CC Vent List:485408304}    Rehab Therapies: {THERAPEUTIC INTERVENTION:1042195699}  Weight Bearing Status/Restrictions: { CC Weight Bearin}  Other Medical Equipment (for information only, NOT a DME order):  {EQUIPMENT:487385221}  Other

## 2025-04-30 NOTE — ED NOTES
, now at bedside, clarifies that patient c/o of small headache at bedtime last night, then woke up at 3:30, c/o too dizzy to get up, so  assisted her to bathroom. She took some tylenol for headache at that time. Hx of intermittent headache, and dizziness, respectively. At 5:30  heard her make a noise and he tried to check on her but she would not respond. He used a cold washrag on her face and she awoke, but was having trouble speaking d/t feeling like throat was dry and had something caught in it. She also had new confusion at this time. They deny Hx of trouble swallowing in the past.   [phone] Al from [facility] therapist would like verbal orders 1 W 1 and 2 W 4  Said once he gets verbal he will fax order to Dr Jc Hurley

## 2025-04-30 NOTE — ED NOTES
RN made Dr (at bedside) aware of c/o of new confusion, difficulty swallowing, and headache with LKW of bedtime last night. Dr Thao examines patient and states that she is not a stroke alert but she will still get a neuro/stroke work-up.

## 2025-05-01 ENCOUNTER — APPOINTMENT (OUTPATIENT)
Dept: MRI IMAGING | Age: 75
End: 2025-05-01
Attending: HOSPITALIST
Payer: MEDICARE

## 2025-05-01 ENCOUNTER — APPOINTMENT (OUTPATIENT)
Dept: ULTRASOUND IMAGING | Age: 75
End: 2025-05-01
Attending: HOSPITALIST
Payer: MEDICARE

## 2025-05-01 PROBLEM — R09.89 SUSPECTED CEREBROVASCULAR ACCIDENT (CVA): Status: RESOLVED | Noted: 2025-04-30 | Resolved: 2025-05-01

## 2025-05-01 LAB
25(OH)D3 SERPL-MCNC: 32.6 NG/ML (ref 30–150)
ANION GAP SERPL CALCULATED.3IONS-SCNC: 11 MMOL/L (ref 9–17)
BUN SERPL-MCNC: 16 MG/DL (ref 7–20)
CALCIUM SERPL-MCNC: 8.8 MG/DL (ref 8.3–10.6)
CHLORIDE SERPL-SCNC: 103 MMOL/L (ref 99–110)
CHOLEST SERPL-MCNC: 134 MG/DL (ref 125–199)
CO2 SERPL-SCNC: 22 MMOL/L (ref 21–32)
CREAT SERPL-MCNC: 1.2 MG/DL (ref 0.6–1.2)
ERYTHROCYTE [DISTWIDTH] IN BLOOD BY AUTOMATED COUNT: 13.3 % (ref 11.7–14.9)
EST. AVERAGE GLUCOSE BLD GHB EST-MCNC: 127 MG/DL
FERRITIN SERPL-MCNC: 224 NG/ML (ref 15–150)
FOLATE SERPL-MCNC: 14 NG/ML (ref 4.8–24.2)
GFR, ESTIMATED: 46 ML/MIN/1.73M2
GLUCOSE SERPL-MCNC: 91 MG/DL (ref 74–99)
HBA1C MFR BLD: 6.1 % (ref 4.2–6.3)
HCT VFR BLD AUTO: 34.9 % (ref 37–47)
HDLC SERPL-MCNC: 49 MG/DL
HGB BLD-MCNC: 11.4 G/DL (ref 12.5–16)
IRON SATN MFR SERPL: 38 % (ref 15–50)
IRON SERPL-MCNC: 82 UG/DL (ref 37–145)
LDH SERPL-CCNC: 159 U/L (ref 100–190)
LDLC SERPL CALC-MCNC: 52 MG/DL
MAGNESIUM SERPL-MCNC: 1.6 MG/DL (ref 1.8–2.4)
MCH RBC QN AUTO: 30.6 PG (ref 27–31)
MCHC RBC AUTO-ENTMCNC: 32.7 G/DL (ref 32–36)
MCV RBC AUTO: 93.8 FL (ref 78–100)
PLATELET # BLD AUTO: 215 K/UL (ref 140–440)
PMV BLD AUTO: 10.2 FL (ref 7.5–11.1)
POTASSIUM SERPL-SCNC: 3.2 MMOL/L (ref 3.5–5.1)
RBC # BLD AUTO: 3.72 M/UL (ref 4.2–5.4)
RETICS # AUTO: 0.05 M/UL
RETICS/RBC NFR AUTO: 1.3 % (ref 0.2–2)
SODIUM SERPL-SCNC: 136 MMOL/L (ref 136–145)
TIBC SERPL-MCNC: 216 UG/DL (ref 260–445)
TRIGL SERPL-MCNC: 165 MG/DL
UNSATURATED IRON BINDING CAPACITY: 134 UG/DL (ref 110–370)
VIT B12 SERPL-MCNC: 197 PG/ML (ref 211–911)
WBC OTHER # BLD: 4.2 K/UL (ref 4–10.5)

## 2025-05-01 PROCEDURE — G0378 HOSPITAL OBSERVATION PER HR: HCPCS

## 2025-05-01 PROCEDURE — 94761 N-INVAS EAR/PLS OXIMETRY MLT: CPT

## 2025-05-01 PROCEDURE — 96372 THER/PROPH/DIAG INJ SC/IM: CPT

## 2025-05-01 PROCEDURE — 2500000003 HC RX 250 WO HCPCS: Performed by: STUDENT IN AN ORGANIZED HEALTH CARE EDUCATION/TRAINING PROGRAM

## 2025-05-01 PROCEDURE — 92610 EVALUATE SWALLOWING FUNCTION: CPT

## 2025-05-01 PROCEDURE — 96375 TX/PRO/DX INJ NEW DRUG ADDON: CPT

## 2025-05-01 PROCEDURE — 6370000000 HC RX 637 (ALT 250 FOR IP): Performed by: STUDENT IN AN ORGANIZED HEALTH CARE EDUCATION/TRAINING PROGRAM

## 2025-05-01 PROCEDURE — 83615 LACTATE (LD) (LDH) ENZYME: CPT

## 2025-05-01 PROCEDURE — 96366 THER/PROPH/DIAG IV INF ADDON: CPT

## 2025-05-01 PROCEDURE — 80061 LIPID PANEL: CPT

## 2025-05-01 PROCEDURE — 83540 ASSAY OF IRON: CPT

## 2025-05-01 PROCEDURE — 93880 EXTRACRANIAL BILAT STUDY: CPT

## 2025-05-01 PROCEDURE — 82728 ASSAY OF FERRITIN: CPT

## 2025-05-01 PROCEDURE — 82746 ASSAY OF FOLIC ACID SERUM: CPT

## 2025-05-01 PROCEDURE — 6360000002 HC RX W HCPCS: Performed by: STUDENT IN AN ORGANIZED HEALTH CARE EDUCATION/TRAINING PROGRAM

## 2025-05-01 PROCEDURE — 36415 COLL VENOUS BLD VENIPUNCTURE: CPT

## 2025-05-01 PROCEDURE — 85027 COMPLETE CBC AUTOMATED: CPT

## 2025-05-01 PROCEDURE — 95816 EEG AWAKE AND DROWSY: CPT | Performed by: STUDENT IN AN ORGANIZED HEALTH CARE EDUCATION/TRAINING PROGRAM

## 2025-05-01 PROCEDURE — 6360000002 HC RX W HCPCS: Performed by: NURSE PRACTITIONER

## 2025-05-01 PROCEDURE — 82607 VITAMIN B-12: CPT

## 2025-05-01 PROCEDURE — 82306 VITAMIN D 25 HYDROXY: CPT

## 2025-05-01 PROCEDURE — 80048 BASIC METABOLIC PNL TOTAL CA: CPT

## 2025-05-01 PROCEDURE — 6370000000 HC RX 637 (ALT 250 FOR IP)

## 2025-05-01 PROCEDURE — 6360000004 HC RX CONTRAST MEDICATION: Performed by: NURSE PRACTITIONER

## 2025-05-01 PROCEDURE — 83036 HEMOGLOBIN GLYCOSYLATED A1C: CPT

## 2025-05-01 PROCEDURE — 83550 IRON BINDING TEST: CPT

## 2025-05-01 PROCEDURE — 83735 ASSAY OF MAGNESIUM: CPT

## 2025-05-01 PROCEDURE — 95819 EEG AWAKE AND ASLEEP: CPT

## 2025-05-01 PROCEDURE — 85045 AUTOMATED RETICULOCYTE COUNT: CPT

## 2025-05-01 PROCEDURE — 96365 THER/PROPH/DIAG IV INF INIT: CPT

## 2025-05-01 PROCEDURE — 70553 MRI BRAIN STEM W/O & W/DYE: CPT

## 2025-05-01 PROCEDURE — 6360000002 HC RX W HCPCS

## 2025-05-01 PROCEDURE — A9577 INJ MULTIHANCE: HCPCS | Performed by: NURSE PRACTITIONER

## 2025-05-01 RX ORDER — POTASSIUM CHLORIDE 1500 MG/1
40 TABLET, EXTENDED RELEASE ORAL ONCE
Status: COMPLETED | OUTPATIENT
Start: 2025-05-01 | End: 2025-05-01

## 2025-05-01 RX ORDER — MAGNESIUM SULFATE IN WATER 40 MG/ML
2000 INJECTION, SOLUTION INTRAVENOUS ONCE
Status: COMPLETED | OUTPATIENT
Start: 2025-05-01 | End: 2025-05-01

## 2025-05-01 RX ORDER — PANTOPRAZOLE SODIUM 40 MG/10ML
40 INJECTION, POWDER, LYOPHILIZED, FOR SOLUTION INTRAVENOUS DAILY
Status: DISCONTINUED | OUTPATIENT
Start: 2025-05-01 | End: 2025-05-02 | Stop reason: HOSPADM

## 2025-05-01 RX ADMIN — MELATONIN TAB 3 MG 3 MG: 3 TAB at 23:32

## 2025-05-01 RX ADMIN — MAGNESIUM SULFATE HEPTAHYDRATE 2000 MG: 40 INJECTION, SOLUTION INTRAVENOUS at 06:17

## 2025-05-01 RX ADMIN — ENOXAPARIN SODIUM 40 MG: 100 INJECTION SUBCUTANEOUS at 17:34

## 2025-05-01 RX ADMIN — ATORVASTATIN CALCIUM 80 MG: 40 TABLET, FILM COATED ORAL at 21:32

## 2025-05-01 RX ADMIN — Medication 10 ML: at 11:41

## 2025-05-01 RX ADMIN — GADOBENATE DIMEGLUMINE 12 ML: 529 INJECTION, SOLUTION INTRAVENOUS at 11:09

## 2025-05-01 RX ADMIN — Medication 10 ML: at 21:32

## 2025-05-01 RX ADMIN — EZETIMIBE 10 MG: 10 TABLET ORAL at 21:32

## 2025-05-01 RX ADMIN — CARVEDILOL 6.25 MG: 6.25 TABLET, FILM COATED ORAL at 17:16

## 2025-05-01 RX ADMIN — LOSARTAN POTASSIUM 50 MG: 25 TABLET, FILM COATED ORAL at 21:32

## 2025-05-01 RX ADMIN — ASPIRIN 81 MG: 81 TABLET, CHEWABLE ORAL at 11:39

## 2025-05-01 RX ADMIN — HYDROCHLOROTHIAZIDE 12.5 MG: 12.5 TABLET ORAL at 21:31

## 2025-05-01 RX ADMIN — CARVEDILOL 6.25 MG: 6.25 TABLET, FILM COATED ORAL at 11:39

## 2025-05-01 RX ADMIN — POTASSIUM CHLORIDE 40 MEQ: 1500 TABLET, EXTENDED RELEASE ORAL at 06:17

## 2025-05-01 RX ADMIN — PANTOPRAZOLE SODIUM 40 MG: 40 INJECTION, POWDER, FOR SOLUTION INTRAVENOUS at 17:34

## 2025-05-01 ASSESSMENT — PAIN SCALES - GENERAL: PAINLEVEL_OUTOF10: 0

## 2025-05-01 NOTE — CARE COORDINATION
LSW spoke with pt regarding discharge plans. Pt lives with her spouse in a 1 story home with # LONNIE.  Pt denies any DME or HC in the home.  Pt still drives.  Pt is independent of ADLs. Pt walking around in room.  Pt has a PCP and last appt was a month ago.  Pt denies any needs and plans home with spouse at discharge.           05/01/25 1218   Service Assessment   Patient Orientation Alert and Oriented   Cognition Alert   History Provided By Patient   Primary Caregiver Self   Support Systems Spouse/Significant Other   Patient's Healthcare Decision Maker is: Legal Next of Kin   PCP Verified by CM Yes   Last Visit to PCP Within last 3 months   Prior Functional Level Independent in ADLs/IADLs   Current Functional Level Independent in ADLs/IADLs   Can patient return to prior living arrangement Yes   Ability to make needs known: Good   Family able to assist with home care needs: Yes   Would you like for me to discuss the discharge plan with any other family members/significant others, and if so, who? No   Social/Functional History   Lives With Spouse   Type of Home House   Home Layout One level   Home Access Stairs to enter without rails   Entrance Stairs - Number of Steps 3 LONNIE   Bathroom Equipment None   Home Equipment None   Occupation Retired   Discharge Planning   Type of Residence House   Living Arrangements Spouse/Significant Other   Current Services Prior To Admission None   Potential Assistance Needed N/A   DME Ordered? No   Patient expects to be discharged to: House   Services At/After Discharge   Transition of Care Consult (CM Consult) N/A   Services At/After Discharge None   Condition of Participation: Discharge Planning   The Patient and/or Patient Representative was provided with a Choice of Provider? Patient   The Patient and/Or Patient Representative agree with the Discharge Plan? Yes   Freedom of Choice list was provided with basic dialogue that supports the patient's individualized plan of care/goals,

## 2025-05-01 NOTE — PROGRESS NOTES
Routine inpatient EEG completed.  Epileptologist, Daija Andrew, notified via "[facility]       Electronically signed by Latisha Rasheed MA on 5/1/2025 at 1:30 PM

## 2025-05-01 NOTE — PROGRESS NOTES
Facility/Department: [facility] 3E   CLINICAL BEDSIDE SWALLOW EVALUATION    NAME: Francy Yañez  : 1950  MRN: [id number]    ADMISSION DATE: 2025  ADMITTING DIAGNOSIS: has RUQ abdominal pain; Calculus of gallbladder with acute on chronic cholecystitis without obstruction; Sigmoid diverticulitis; Hypertelorism; Chest pain; S/P angioplasty; Other chest pain; Precordial chest pain; Altered mental status; and Suspected cerebrovascular accident (CVA) on their problem list.  ONSET DATE: This admission     Recent Chest Xray/CT of Chest: Chest xray on 25 reveals \"NONACUTE SINGLE VIEW CHEST.\"     Date of Eval: 2025  Evaluating Therapist: MIRIAM Garces    Impression: Francy Yañez was seen today for a clinical bedside swallow evaluation following admission to [facility] with AMS. Head CT on 25 reveals \"Likely mild chronic small vessel ischemic change without evidence of acute   intracranial abnormality.\" MRI is pending. Relevant medical hx includes HTN, HLD, CAD s/p PCI.     Francy Yañez was positioned upright in bed for the current visit. Pt pleasant and agreeable. Denies difficulty swallowing and states she eats a regular diet at home. On room air. RN Alicia reports no difficulty swallowing to her knowledge. Oral mechanism examination is WFL. Vocal quality is WFL. Informal speech-language screen performed during this visit. Pt reports some chronic changes is memory, denies difficulty with speech/word-finding. Pt oriented to self, type of place, and . States \"[place]\" initially for place. Oriented to year, incorrectly states month as \"April.\" Speech intelligibility is WFL. Pt names 5/5 common objects without difficulty. Pt demonstrates functional speech-language skills for navigation of the acute care environment. Pt speech therapy warranted at this level of care.    Francy Yañez consumed trials of thin liquid via cup/straw and regular solid during this visit. Pt presents with evidence of a functional

## 2025-05-01 NOTE — PROGRESS NOTES
V2.0  [facility] Hospitalist Progress Note      Name:  Francy Yañez /Age/Sex: 1950  (75 y.o. female)   MRN & CSN:  [id number] & [id number] Encounter Date/Time: 2025 7:26 AM EDT    [facility]  [facility] PCP: Juanjose Mishra MD       Hospital Day: 2    Assessment and Plan:   Francy Yañez is a 75 y.o. female with pmh of HTN, HLD, CAD s/p PCI  who presents with Altered mental status      Plan:    Suspected stroke:  Syncope, HA and AMS  -Presented with unresponsiveness at [facility] ED.  -LKW 3 AM 25  -At [facility] ED vitals, labs and imaging unremarkable  -Concern for stroke.  Transferred to [facility] for further evaluation  -Patient at baseline on arrival to [facility], NIH 0  -MRI w/ and w/out Contrast Brain: pending   -DUP US Carotids: <50% stenosis in bilateral ICA's  -Neurology Consult: appreciate recs   -Cardiology Consult: appreciate recs     20 lb Weight Loss, Nausea, Early Satiety, Epigastric Pain  -concern for gastritis vs GI malignancy  -CT Abd/Pelvis: pending  -Consulting GI: needs EGD  -starting IV protonix 40 mg QD      Chronic co morbidities:   HTN/HLD-resume home meds  CAD s/p PCI-resume home dose of aspirin       Diet ADULT ORAL NUTRITION SUPPLEMENT; Breakfast; Standard High Calorie/High Protein Oral Supplement  ADULT ORAL NUTRITION SUPPLEMENT; Lunch; Fortified Gelatin Oral Supplement  ADULT ORAL NUTRITION SUPPLEMENT; Dinner; Frozen Oral Supplement  ADULT DIET; Regular; No Added Salt (3-4 gm)   DVT Prophylaxis [x] Lovenox, []  Heparin, [] SCDs, [] Ambulation,  [] Eliquis, [] Xarelto  [] Coumadin   Code Status Limited   Disposition From: Home  Expected Disposition: Home  Estimated Date of Discharge: 1 day  Patient requires continued admission due to Neuro, Cardiology and GI evals ongoing   Surrogate Decision Maker/ POA Spouse- Guillaume Yañez     Personally reviewed Lab Studies and Imaging     Discussed management of the case with Dr. Justyna Rivera.       EKG interpreted personally and results Sinus Bradycardia  Collection Time: 05/01/25  3:08 AM   Result Value Ref Range    Magnesium 1.6 (L) 1.8 - 2.4 mg/dL        Imaging/Diagnostics Last 24 Hours   CT HEAD WO CONTRAST  Result Date: 4/30/2025  CT head without contrast Technique: Serial axial 5 mm noncontrast CT images were obtained through the brain. CT radiation dose optimization techniques (automated exposure control, and use of iterative reconstruction techniques, or adjustment of the mA and/or kV according to patient size) were used to limit patient radiation dose. INDICATION: head pain, AMS DEMOGRAPHICS: 75 years old Female 4/30/2025 7:36 Contrast utilized and relevant clinical information: Findings: Mild diffuse cerebral volume loss is demonstrated. The basilar cisterns are patent. Gray-white white matter differentiation is grossly preserved without evidence of acute cortical infarction. Please note that CT is relatively insensitive for acute cortical infarction. MRI with diffusion-weighted imaging can be obtained for further evaluation as clinically warranted. Areas of diminished attenuation are appreciated within the periventricular and subcortical white matter bilaterally. While nonspecific, these findings likely represent evidence of mild chronic small vessel ischemic change in a patient of this age. There is no acute parenchymal hemorrhage, extra-axial collection, mass effect, or midline shift appreciated. No depressed or displaced calvarial fracture is demonstrated. The visualized paranasal sinuses and bilateral mastoid air cells are adequately pneumatized. IMPRESSION: 1. Likely mild chronic small vessel ischemic change without evidence of acute intracranial abnormality.  Dictated and Electronically Signed By: Ernesto David MD 4/30/2025 7:36        XR CHEST PORTABLE  Result Date: 4/30/2025   XR CHEST PORTABLE [id number] 4/30/2025 7:19 Reason for exam:chest pain Comparison:  None Findings: The lungs are clear.  The cardiomediastinal silhouette, ramon,

## 2025-05-01 NOTE — CONSULTS
Comprehensive Nutrition Assessment    Type and Reason for Visit:  Initial, Consult (Oral nutrition supplements, Unintentional Weight Loss)    Nutrition Recommendations/Plan:   Modify diet to soft and bite sized until SLP able to eval  Trial standard, frozen and fortified gelatin oral nutrition supplements  Monitor chewing/swallowing, weights, po intakes, labs, skin, POC     Malnutrition Assessment:  Malnutrition Status:  Insufficient data (05/01/25 1431)    Context:  Acute Illness       Nutrition Assessment:    Admitted w/ AMS, pmh of HTN, HLD, CAD s/p PCI. Having procedure done in room today, MAT. Pt seen for low BMI for advanced age, as well as consult for wt loss/supplements. Mild wt loss noted over the past 10mo per chart review. Reasonable to offer oral supplements. SLP consulted for AMS/complaint of trouble chewing/swallowing- will modify diet for now .Follow at high nutrition risk.    Nutrition Related Findings:    +HCTZ. K 3.2, Mg 1.6 Wound Type: None       Current Nutrition Intake & Therapies:    Average Meal Intake: 1-25%  Average Supplements Intake: None Ordered  ADULT DIET; Regular; Low Fat/Low Chol/High Fiber/2 gm Na    Anthropometric Measures:  Height: 170.2 cm (5' 7\")  Ideal Body Weight (IBW): 135 lbs (61 kg)    Admission Body Weight: 59.8 kg (131 lb 13.4 oz) (no source)  Current Body Weight: 59.8 kg (131 lb 13.4 oz),   IBW. Weight Source: Not specified  Current BMI (kg/m2): 20.6  Usual Body Weight: 64 kg (141 lb 1.5 oz) (7/24/24)     % Weight Change (Calculated): -6.6  Weight Adjustment For: No Adjustment                 BMI Categories: Underweight (BMI less than 22) age over 65    Estimated Daily Nutrient Needs:  Energy Requirements Based On: Kcal/kg  Weight Used for Energy Requirements: Current  Energy (kcal/day): 3664-0785 (30-35kcal/kg)  Weight Used for Protein Requirements: Current  Protein (g/day): 72-84 (1.2-1.4g/kg)  Method Used for Fluid Requirements: 1 ml/kcal  Fluid (ml/day):

## 2025-05-01 NOTE — PLAN OF CARE
Problem: Discharge Planning  Goal: Discharge to home or other facility with appropriate resources  Outcome: Progressing     Problem: Safety - Adult  Goal: Free from fall injury  Outcome: Progressing     Problem: Neurosensory - Adult  Goal: Achieves stable or improved neurological status  Outcome: Progressing  Goal: Achieves maximal functionality and self care  Outcome: Progressing     Problem: Cardiovascular - Adult  Goal: Maintains optimal cardiac output and hemodynamic stability  Outcome: Progressing  Goal: Absence of cardiac dysrhythmias or at baseline  Outcome: Progressing     Problem: Skin/Tissue Integrity - Adult  Goal: Skin integrity remains intact  Outcome: Progressing     Problem: Metabolic/Fluid and Electrolytes - Adult  Goal: Electrolytes maintained within normal limits  Outcome: Progressing     Problem: Nutrition Deficit:  Goal: Optimize nutritional status  Outcome: Progressing

## 2025-05-01 NOTE — PROCEDURES
ROUTINE ELECTROENCEPHALOGRAM    Identifying Information:  Name: Francy Yañez  MRN: [id number]  : 1950  Interpreting Physician: Daija Garcias DO  Referring Provider: МАРИНА Menjivar  Date of EE25  [place] [facility] [place] History:  Francy Yañez is a 75 y.o. female with concern for seizures    Current Medications:    pantoprazole  40 mg IntraVENous Daily    sodium chloride flush  5-40 mL IntraVENous 2 times per day    enoxaparin  40 mg SubCUTAneous QPM    aspirin  81 mg Oral Daily    Or    aspirin  300 mg Rectal Daily    atorvastatin  80 mg Oral Nightly    carvedilol  6.25 mg Oral BID WC    ezetimibe  10 mg Oral Daily    losartan  50 mg Oral Daily    And    hydroCHLOROthiazide  12.5 mg Oral Daily        Indication:  Rule out seizure/seizure disorder     Technical Summary:  28 channels of EEG were recorded in a digital format on a patient who was reported to be awake and drowsy during the recording. The patient not sleep deprived prior to the EEG.     The PDR consisted of well-developed, well-regulated 8-9 Hz alpha activity, maximal over the posterior head regions and reactive to eye opening and closure.     Photic stimulation performed and did not produce any abnormalities. During the recording stage II sleep was not seen.     The EKG lead revealed no rhythm abnormalities.       EEG Interpretation:   This EEG was within normal limits for a patient of this age in the awake and drowsy state. No focal, lateralizing, or epileptiform features were seen during the recording.       Clinical correlation is recommended.    Daija Garicas DO  Epileptologist  2025 4:09 PM

## 2025-05-01 NOTE — CONSULTS
Neurology Service Consult Note  [facility]   Patient Name: Francy Yañez  : 1950        Subjective:   Reason for consult: Stroke symptoms  75 y.o. female with history of CAD, HLD, HTN  presenting to [facility] as transfer from [facility] with altered mental status and feeling like she can not swallow. Per chart review patient woke at 0300 yesterday morning to use the bathroom, she had complaints of dizziness and  assisted her to the bathroom. Around 0530  heard the patient make a noise and patient would not respond. He applied a cold washrag to her face and she woke but was  having difficulty speaking and was complaining that her throat was dry and she felt like something was stuck in it.      Chart was reviewed in detail, patient was seen and evaluated.  Patient's  is at bedside and he helps to provide collateral history.  Tells me the patient has been having frequent headache and dizziness which she describes as a spinning sensation for what she has told him.  Headache is bifrontal in nature and is associated with photophobia, phonophobia and nausea/vomiting.  Dizziness is worse with change of position and resolves when patient is able to sit still.  He tells me that when the patient gotten up in the early morning hours to use the bathroom that she was very dizzy and woke him to help her to the bathroom and she was unable to steady her gait to do so independently.  He returned her to bed and then woke up to something, he believes that the patient yelled out by the time he got to her she was minimally responsive.  At time of exam today she is back to baseline.  She denies headache for me.  She is able to follow commands.  She is oriented to self and her  at bedside.  She is otherwise confused telling me that she is in a \"medical office\" and that \"people do procedures here\".  Patient's  shares that she has had ongoing cognitive    Substance and Sexual Activity    Alcohol use: Yes     Comment: Socially    Drug use: No    Sexual activity: Never   Other Topics Concern    Not on file   Social History Narrative    Not on file     Social Drivers of Health     Financial Resource Strain: Low Risk  (5/21/2024)    Received from [employer]    Overall Financial Resource Strain (CARDIA)     Difficulty of Paying Living Expenses: Not very hard   Food Insecurity: No Food Insecurity (4/30/2025)    Hunger Vital Sign     Worried About Running Out of Food in the Last Year: Never true     Ran Out of Food in the Last Year: Never true   Transportation Needs: No Transportation Needs (4/30/2025)    PRAPARE - Transportation     Lack of Transportation (Medical): No     Lack of Transportation (Non-Medical): No   Physical Activity: Insufficiently Active (5/21/2024)    Received from [employer]    Exercise Vital Sign     Days of Exercise per Week: 4 days     Minutes of Exercise per Session: 30 min   Stress: No Stress Concern Present (5/21/2024)    Received from [employer]    [employer] - Occupational Stress Questionnaire     Feeling of Stress : Not at all   Social Connections: Moderately Integrated (5/21/2024)    Received from [employer]    Social Connection and Isolation Panel [NHANES]     Frequency of Communication with Friends and Family: More than three times a week     Frequency of Social Gatherings with Friends and Family: Twice a week     Attends Protestant Services: Never     Active Member of Clubs or Organizations: Yes     Attends Club or Organization Meetings: More than 4 times per year     Marital Status:    Intimate Partner Violence: Patient Declined (5/21/2024)    Received from [employer]    Humiliation, Afraid, Rape, and Kick questionnaire     Fear of Current or Ex-Partner: Patient declined     Emotionally Abused: Patient

## 2025-05-01 NOTE — PROGRESS NOTES
4 Eyes Skin Assessment     NAME:  Francy Yañez  YOB: 1950  MEDICAL RECORD NUMBER:  [id number]    The patient is being assessed for  Admission    I agree that at least one RN has performed a thorough Head to Toe Skin Assessment on the patient. ALL assessment sites listed below have been assessed.      Areas assessed by both nurses:    Head, Face, Ears, Shoulders, Back, Chest, Arms, Elbows, Hands, Sacrum. Buttock, Coccyx, Ischium, Legs. Feet and Heels, and Under Medical Devices         Does the Patient have a Wound? No noted wound(s)       Sim Prevention initiated by RN: Yes  Wound Care Orders initiated by RN: No    Pressure Injury (Stage 3,4, Unstageable, DTI, NWPT, and Complex wounds) if present, place Wound referral order by RN under : No    New Ostomies, if present place, Ostomy referral order under : No     Nurse 1 eSignature: Electronically signed by Nicole Kumar RN on 4/30/25 at 10:16 PM EDT    **SHARE this note so that the co-signing nurse can place an eSignature**    Nurse 2 eSignature: {Esignature:[id number]}

## 2025-05-01 NOTE — PROGRESS NOTES
Pt noted to be having some mild confusion as the night progressed, she is easily reoriented. Repeat NIH was 1 from baseline 0, with her thinking it's February. Will continue to monitor

## 2025-05-01 NOTE — PROGRESS NOTES
I did NOT see the patient. The patient was discussed with the CHANTE. I was available for questions and consultation as needed.     Awaiting neuro workup.

## 2025-05-02 ENCOUNTER — TELEPHONE (OUTPATIENT)
Dept: CARDIOLOGY CLINIC | Age: 75
End: 2025-05-02

## 2025-05-02 ENCOUNTER — APPOINTMENT (OUTPATIENT)
Dept: CT IMAGING | Age: 75
End: 2025-05-02
Attending: HOSPITALIST
Payer: MEDICARE

## 2025-05-02 ENCOUNTER — APPOINTMENT (OUTPATIENT)
Dept: NON INVASIVE DIAGNOSTICS | Age: 75
End: 2025-05-02
Attending: HOSPITALIST
Payer: MEDICARE

## 2025-05-02 VITALS
HEIGHT: 67 IN | HEART RATE: 77 BPM | OXYGEN SATURATION: 100 % | BODY MASS INDEX: 20.56 KG/M2 | RESPIRATION RATE: 18 BRPM | TEMPERATURE: 97.8 F | DIASTOLIC BLOOD PRESSURE: 64 MMHG | SYSTOLIC BLOOD PRESSURE: 132 MMHG | WEIGHT: 131 LBS

## 2025-05-02 LAB
ANION GAP SERPL CALCULATED.3IONS-SCNC: 13 MMOL/L (ref 9–17)
BUN SERPL-MCNC: 13 MG/DL (ref 7–20)
CALCIUM SERPL-MCNC: 9.5 MG/DL (ref 8.3–10.6)
CHLORIDE SERPL-SCNC: 104 MMOL/L (ref 99–110)
CO2 SERPL-SCNC: 21 MMOL/L (ref 21–32)
CREAT SERPL-MCNC: 1 MG/DL (ref 0.6–1.2)
CRP SERPL HS-MCNC: <3 MG/L (ref 0–5)
ECHO AO ROOT DIAM: 3.1 CM
ECHO AO ROOT INDEX: 1.83 CM/M2
ECHO AV AREA PEAK VELOCITY: 2.2 CM2
ECHO AV AREA VTI: 2.1 CM2
ECHO AV AREA/BSA PEAK VELOCITY: 1.3 CM2/M2
ECHO AV AREA/BSA VTI: 1.2 CM2/M2
ECHO AV CUSP MM: 1.6 CM
ECHO AV MEAN GRADIENT: 3 MMHG
ECHO AV MEAN VELOCITY: 0.8 M/S
ECHO AV PEAK GRADIENT: 5 MMHG
ECHO AV PEAK VELOCITY: 1.1 M/S
ECHO AV VELOCITY RATIO: 0.91
ECHO AV VTI: 24 CM
ECHO BSA: 1.68 M2
ECHO EST RA PRESSURE: 3 MMHG
ECHO IVC PROX: 1.4 CM
ECHO LA AREA 4C: 7.8 CM2
ECHO LA DIAMETER INDEX: 1.42 CM/M2
ECHO LA DIAMETER: 2.4 CM
ECHO LA MAJOR AXIS: 3.8 CM
ECHO LA TO AORTIC ROOT RATIO: 0.77
ECHO LA VOL MOD A4C: 13 ML (ref 22–52)
ECHO LA VOLUME INDEX MOD A4C: 8 ML/M2 (ref 16–34)
ECHO LV E' LATERAL VELOCITY: 8.6 CM/S
ECHO LV E' SEPTAL VELOCITY: 6.8 CM/S
ECHO LV EF PHYSICIAN: 55 %
ECHO LV FRACTIONAL SHORTENING: 31 % (ref 28–44)
ECHO LV INTERNAL DIMENSION DIASTOLE INDEX: 2.49 CM/M2
ECHO LV INTERNAL DIMENSION DIASTOLIC: 4.2 CM (ref 3.9–5.3)
ECHO LV INTERNAL DIMENSION SYSTOLIC INDEX: 1.72 CM/M2
ECHO LV INTERNAL DIMENSION SYSTOLIC: 2.9 CM
ECHO LV IVSD: 0.8 CM (ref 0.6–0.9)
ECHO LV MASS 2D: 118.7 G (ref 67–162)
ECHO LV MASS INDEX 2D: 70.2 G/M2 (ref 43–95)
ECHO LV POSTERIOR WALL DIASTOLIC: 1 CM (ref 0.6–0.9)
ECHO LV RELATIVE WALL THICKNESS RATIO: 0.48
ECHO LVOT AREA: 2.5 CM2
ECHO LVOT AV VTI INDEX: 0.83
ECHO LVOT DIAM: 1.8 CM
ECHO LVOT MEAN GRADIENT: 2 MMHG
ECHO LVOT PEAK GRADIENT: 4 MMHG
ECHO LVOT PEAK VELOCITY: 1 M/S
ECHO LVOT STROKE VOLUME INDEX: 29.8 ML/M2
ECHO LVOT SV: 50.4 ML
ECHO LVOT VTI: 19.8 CM
ECHO MV A VELOCITY: 0.82 M/S
ECHO MV E DECELERATION TIME (DT): 257 MS
ECHO MV E VELOCITY: 0.68 M/S
ECHO MV E/A RATIO: 0.83
ECHO MV E/E' LATERAL: 7.91
ECHO MV E/E' RATIO (AVERAGED): 8.95
ECHO MV E/E' SEPTAL: 10
ECHO RA AREA 4C: 7.4 CM2
ECHO RA END SYSTOLIC VOLUME APICAL 4 CHAMBER INDEX BSA: 8 ML/M2
ECHO RA VOLUME: 13 ML
ECHO RV FREE WALL PEAK S': 14 CM/S
ECHO RV MID DIMENSION: 2.2 CM
ECHO RV TAPSE: 2 CM (ref 1.7–?)
GFR, ESTIMATED: 53 ML/MIN/1.73M2
GLUCOSE SERPL-MCNC: 87 MG/DL (ref 74–99)
MAGNESIUM SERPL-MCNC: 2 MG/DL (ref 1.8–2.4)
POTASSIUM SERPL-SCNC: 3.9 MMOL/L (ref 3.5–5.1)
SODIUM SERPL-SCNC: 139 MMOL/L (ref 136–145)

## 2025-05-02 PROCEDURE — 6370000000 HC RX 637 (ALT 250 FOR IP)

## 2025-05-02 PROCEDURE — 93306 TTE W/DOPPLER COMPLETE: CPT

## 2025-05-02 PROCEDURE — 96376 TX/PRO/DX INJ SAME DRUG ADON: CPT

## 2025-05-02 PROCEDURE — 97162 PT EVAL MOD COMPLEX 30 MIN: CPT

## 2025-05-02 PROCEDURE — 83735 ASSAY OF MAGNESIUM: CPT

## 2025-05-02 PROCEDURE — 99223 1ST HOSP IP/OBS HIGH 75: CPT | Performed by: INTERNAL MEDICINE

## 2025-05-02 PROCEDURE — 80048 BASIC METABOLIC PNL TOTAL CA: CPT

## 2025-05-02 PROCEDURE — 6370000000 HC RX 637 (ALT 250 FOR IP): Performed by: STUDENT IN AN ORGANIZED HEALTH CARE EDUCATION/TRAINING PROGRAM

## 2025-05-02 PROCEDURE — 74176 CT ABD & PELVIS W/O CONTRAST: CPT

## 2025-05-02 PROCEDURE — 36415 COLL VENOUS BLD VENIPUNCTURE: CPT

## 2025-05-02 PROCEDURE — 93306 TTE W/DOPPLER COMPLETE: CPT | Performed by: INTERNAL MEDICINE

## 2025-05-02 PROCEDURE — 86140 C-REACTIVE PROTEIN: CPT

## 2025-05-02 PROCEDURE — 6370000000 HC RX 637 (ALT 250 FOR IP): Performed by: NURSE PRACTITIONER

## 2025-05-02 PROCEDURE — 96372 THER/PROPH/DIAG INJ SC/IM: CPT

## 2025-05-02 PROCEDURE — G0378 HOSPITAL OBSERVATION PER HR: HCPCS

## 2025-05-02 PROCEDURE — 97166 OT EVAL MOD COMPLEX 45 MIN: CPT

## 2025-05-02 PROCEDURE — 6360000002 HC RX W HCPCS: Performed by: NURSE PRACTITIONER

## 2025-05-02 RX ORDER — PANTOPRAZOLE SODIUM 40 MG/1
40 TABLET, DELAYED RELEASE ORAL
Qty: 30 TABLET | Refills: 2 | Status: SHIPPED | OUTPATIENT
Start: 2025-05-02

## 2025-05-02 RX ORDER — SENNA AND DOCUSATE SODIUM 50; 8.6 MG/1; MG/1
2 TABLET, FILM COATED ORAL DAILY PRN
Status: DISCONTINUED | OUTPATIENT
Start: 2025-05-02 | End: 2025-05-02 | Stop reason: HOSPADM

## 2025-05-02 RX ORDER — RANOLAZINE 500 MG/1
1000 TABLET, EXTENDED RELEASE ORAL 2 TIMES DAILY
Status: DISCONTINUED | OUTPATIENT
Start: 2025-05-02 | End: 2025-05-02 | Stop reason: HOSPADM

## 2025-05-02 RX ORDER — POLYETHYLENE GLYCOL 3350 17 G/17G
17 POWDER, FOR SOLUTION ORAL 2 TIMES DAILY
Status: DISCONTINUED | OUTPATIENT
Start: 2025-05-02 | End: 2025-05-02 | Stop reason: HOSPADM

## 2025-05-02 RX ORDER — LANOLIN ALCOHOL/MO/W.PET/CERES
1000 CREAM (GRAM) TOPICAL DAILY
Status: DISCONTINUED | OUTPATIENT
Start: 2025-05-02 | End: 2025-05-02 | Stop reason: HOSPADM

## 2025-05-02 RX ORDER — RANOLAZINE 1000 MG/1
1000 TABLET, EXTENDED RELEASE ORAL EVERY 12 HOURS
COMMUNITY
Start: 2025-02-07

## 2025-05-02 RX ORDER — CYANOCOBALAMIN 1000 UG/ML
1000 INJECTION, SOLUTION INTRAMUSCULAR; SUBCUTANEOUS ONCE
Status: COMPLETED | OUTPATIENT
Start: 2025-05-02 | End: 2025-05-02

## 2025-05-02 RX ORDER — PANTOPRAZOLE SODIUM 40 MG/1
40 TABLET, DELAYED RELEASE ORAL
Qty: 30 TABLET | Refills: 2 | Status: SHIPPED | OUTPATIENT
Start: 2025-05-02 | End: 2025-05-02 | Stop reason: HOSPADM

## 2025-05-02 RX ADMIN — CYANOCOBALAMIN 1000 MCG: 1000 INJECTION, SOLUTION INTRAMUSCULAR; SUBCUTANEOUS at 13:21

## 2025-05-02 RX ADMIN — Medication 1000 MCG: at 13:21

## 2025-05-02 RX ADMIN — ASPIRIN 81 MG: 81 TABLET, CHEWABLE ORAL at 11:09

## 2025-05-02 RX ADMIN — CARVEDILOL 6.25 MG: 6.25 TABLET, FILM COATED ORAL at 11:09

## 2025-05-02 RX ADMIN — RANOLAZINE 1000 MG: 500 TABLET, EXTENDED RELEASE ORAL at 11:09

## 2025-05-02 RX ADMIN — PANTOPRAZOLE SODIUM 40 MG: 40 INJECTION, POWDER, FOR SOLUTION INTRAVENOUS at 11:08

## 2025-05-02 NOTE — CONSULTS
I saw and evaluated the patient myself.  I personally reviewed the chart, labs, and imaging studies and provided a substantive portion of the care for this patient.  I personally performed all aspects of medical decision making for this encounter. I have spoken with the patient, nursing staff and provided them with appropriate verbal and written instructions.    - I have reviewed and verified this documentation done by Shagufta Moya and it accurately reflects our care and I have added a separate note to reflect my clinical impression and suggestions.    The patient is a 75 y.o. female with hx per HPI. GI consulted for weight loss, nausea, epigastric pain.     GEN:     NAD, conversant, resting comfortably.  GI:     Soft, NT. ND. No rebound/guarding. No palpable organomegaly     A/P:  Weight loss  Nausea,vomiting  Epigastric pain  CAD s/p PCI    Plan:  - Patient wishes to go home today and do outpatient work up. Will get her in clinic next week and plan for outpatient EGD +/- colonoscopy.   - PPI in the meantime  - Ok to d/c with close f/u early next week      Emily Gonzalez ([facility])    [facility]  Gastroenterology Consultation    2025  7:48 AM  _________________________________________________________________________  Patient:    Francy Yañez  : 1950   75 y.o.             MRN: [id number]  Admitted: 2025  5:36 PM ATT: Justyna Rivera MD   [id number]/3001-A  AdmitDx: Altered mental status [R41.82]  Suspected cerebrovascular accident (CVA) [R09.89] PCP: Juanjose Mishra MD  _________________________________________________________________________    Reason for Consult:  20 lb weight loss, nausea, epigastric pain, acid reflux - r/o Gasrtritis vs malignancy   Requesting Physician:  Justyna Rivera MD    _________________________________________________________________________  IMPRESSION and RECOMMENDATIONS:    The patient is a 75 y.o. female with hx per HPI. GI consulted

## 2025-05-02 NOTE — DISCHARGE SUMMARY
V2.0  Discharge Summary    Name:  Francy Yañez /Age/Sex: 1950 (75 y.o. female)   Admit Date: 2025  Discharge Date: 25    MRN & CSN:  [id number] & [id number] Encounter Date and Time 25 12:18 PM EDT    Attending:  Justyna Rivera MD Discharging Provider: МАРИНА ROSALES [facility] Course:     Brief HPI: Francy Yañez is a 75 y.o. female who presented with AMS and Headache at [place] ED. Had stroke work up there which was negative. While sitting in the ED cot, she lost consciousness for almost 1 1/2 hours. Was transferred here for further evaluation.  Neurology was consulted and she had an MRI of the brain which ruled out a stroke.  Her mental status has improved to her baseline and her headache improved as well.  She denies any lightheadedness or dizziness, however she does admit to about a 20 pound weight loss over the past 6 months along with nausea, decreased appetite and early satiety.  GI was consulted for evaluation however the  does not want her to stay will only get this done outpatient so we had to discharge her today. Cardiology was consulted due to prolonged syncope, they recommend ECHO today, so will stay for the ECHO then leaving right after.  She will need to follow up with PCP, GI and Cardiology outpatient to complete her evaluation.     Brief Problem Based Course:     Suspected stroke:  Syncope, HA and AMS  -Presented with unresponsiveness at [place] ED for > 1 hour, was taken there initially for HA and AMS  -LKW 3 AM 25  -At [place] ED vitals, labs and imaging unremarkable  -Concern for stroke.  Transferred to [facility] for further evaluation  -Patient at baseline on arrival to [facility], NIH 0  -MRI w/ and w/out Contrast Brain: negative for CVA or mass, chronic microvascular ischemic changes  -DUP US Carotids: <50% stenosis in bilateral ICA's  -Neurology Consult: no CVA or neurologic issues, no further follow up needed  -Cardiology Consult: recommended ECHO,

## 2025-05-02 NOTE — PROGRESS NOTES
Occupational Therapy  [facility] ACUTE CARE OCCUPATIONAL THERAPY EVALUATION    Francy Yañez, 1950, 3001/3001-A, 5/2/2025    Discharge Recommendation: 24/7 + [facility]      History:  [place]:  The primary encounter diagnosis was Other chest pain. Diagnoses of Suspected cerebrovascular accident (CVA), Syncope, unspecified syncope type, and [place] coma scale total score 9-12, in the field (EMT or ambulance) were also pertinent to this visit.  Past Medical History:   Diagnosis Date    CAD (coronary artery disease)     Hyperlipidemia     Hypertension     \"on medication since 2015\"       Subjective:  Patient states: \"So what do you guys do?\"  Pain:  denies  Communication with other providers: co-eval w/ PT, handoff to RN  Restrictions: General Precautions, Fall Risk    Home Setup/Prior level of function:  Social/Functional History  Lives With: Spouse  Type of Home: House  Home Layout: One level  Home Access: Stairs to enter without rails  Entrance Stairs - Number of Steps: 3 LONNIE  Bathroom Equipment: None  Home Equipment: None  Occupation: Retired     Examination:  Observation: Supine in bed upon arrival, agreeable to therapy eval.  Vision: WFL  Hearing: WFL  Vitals: Stable vitals throughout session on room air      Body Systems and functions:  ROM: WFL   Strength: B UE 4/5 across all major joints   Sensation: WFL  Tone: Normal  Coordination: WFL  Perception: WNL      Cognitive and Psychosocial Functioning:  Overall cognitive status: alert, oriented to person and place  Affect: Normal   Arousal/Alertness Appears intact   Following Commands Follows multistep commands with repetition   Attention Span Attends with cues to redirect   Safety Judgement Decreased awareness of need for safety; Decreased awareness of need for assistance   Problem Solving Decreased awareness of errors   Insights Decreased awareness of deficits   Initiation Requires cues for some   Sequencing Requires cues for some       Functional

## 2025-05-02 NOTE — CONSULTS
CARDIOLOGY CONSULT NOTE   Reason for consultation: Unresponsiveness    Referring physician:  No admitting provider for patient encounter.     Primary care physician: Juanjose Mishra MD      Dear   Thanks for the consult.    History of present illness:Francy is a 75 y.o.year old who  presents with unresponsive, patient does not remember much about her event however her  is here by the bedside which is that at 5:00 in the morning she made some moaning noises and then she was unresponsive, she was not sleeping but she was unresponsive  called squad and the squad came and checked her glucose and blood pressure and vitals and recheck however patient does not other  does not know about the vitals and blood sugar and was sent to the [facility] and from that she is transferred to [place].  In January she had a heart cath which was patent LAD circumflex RCA stent, patient has any chest pain or any palpitations      Blood pressure, cholesterol, blood glucose and weight are well controlled.    Past medical history:    has a past medical history of CAD (coronary artery disease), Hyperlipidemia, and Hypertension.  Past surgical history:   has a past surgical history that includes Knee Arthroplasty (Right, 2015); Abdominoplasty (10+ yrs ago); Breast reduction surgery (2012); Hysterectomy (age 37); Cholecystectomy, laparoscopic (05/10/2017); Coronary angioplasty with stent; and Thyroid surgery.  Social History:   reports that she has never smoked. She has never used smokeless tobacco. She reports current alcohol use. She reports that she does not use drugs.  Family history:   no family history of CAD, STROKE of DM    Allergies   Allergen Reactions    Dye [Iodides] Hives     \"Broke out in hives and got real red and hot.\"    Ibuprofen Other (See Comments)     \"Slows heart rate way down\"       polyethylene glycol (GLYCOLAX) packet 17 g, BID  sennosides-docusate sodium (SENOKOT-S) 8.6-50 MG tablet 2

## 2025-05-02 NOTE — PROGRESS NOTES
I did NOT see the patient. The patient was discussed with the CHANTE. I was available for questions and consultation as needed.       Refusing echo. Plan to go home and follow up as outpatient.

## 2025-05-02 NOTE — PLAN OF CARE
Problem: Discharge Planning  Goal: Discharge to home or other facility with appropriate resources  5/2/2025 1121 by Alicia Cross RN  Outcome: Progressing  5/2/2025 0006 by Nicole Kumar RN  Outcome: Progressing     Problem: Safety - Adult  Goal: Free from fall injury  5/2/2025 1121 by Alicia Cross RN  Outcome: Progressing  5/2/2025 0006 by Nicole Kumar RN  Outcome: Progressing     Problem: Neurosensory - Adult  Goal: Achieves stable or improved neurological status  5/2/2025 1121 by Alicia Cross RN  Outcome: Progressing  5/2/2025 0006 by Nicole Kumar RN  Outcome: Progressing  Goal: Achieves maximal functionality and self care  5/2/2025 1121 by Alicia Cross RN  Outcome: Progressing  5/2/2025 0006 by Nicole Kumar RN  Outcome: Progressing     Problem: Cardiovascular - Adult  Goal: Maintains optimal cardiac output and hemodynamic stability  5/2/2025 1121 by Alicia Cross RN  Outcome: Progressing  5/2/2025 0006 by Nicole Kumar RN  Outcome: Progressing  Goal: Absence of cardiac dysrhythmias or at baseline  5/2/2025 1121 by Alicia Cross RN  Outcome: Progressing  5/2/2025 0006 by Nicole Kumar RN  Outcome: Progressing     Problem: Skin/Tissue Integrity - Adult  Goal: Skin integrity remains intact  5/2/2025 1121 by Alicia Cross RN  Outcome: Progressing  5/2/2025 0006 by Nicole Kumar RN  Outcome: Progressing     Problem: Metabolic/Fluid and Electrolytes - Adult  Goal: Electrolytes maintained within normal limits  5/2/2025 1121 by Alicia Cross RN  Outcome: Progressing  5/2/2025 0006 by Nicole Kumar RN  Outcome: Progressing     Problem: Nutrition Deficit:  Goal: Optimize nutritional status  5/2/2025 1121 by Alicia Cross RN  Outcome: Progressing  5/2/2025 0006 by Nicole Kumar RN  Outcome: Progressing     Problem: Pain  Goal: Verbalizes/displays adequate comfort level or baseline comfort level  5/2/2025 1121 by Alicia Cross, RN  Outcome:  Progressing  5/2/2025 0006 by Nicole Kumar, RN  Outcome: Progressing

## 2025-05-02 NOTE — CONSULTS
[facility] ACUTE CARE PHYSICAL THERAPY EVALUATION  Francy Yañez, 1950, 3001/3001-A, 5/2/2025    History  [place]:  The primary encounter diagnosis was Other chest pain. Diagnoses of Suspected cerebrovascular accident (CVA), Syncope, unspecified syncope type, and [place] coma scale total score 9-12, in the field (EMT or ambulance) were also pertinent to this visit.  Patient  has a past medical history of CAD (coronary artery disease), Hyperlipidemia, and Hypertension.  Patient  has a past surgical history that includes Knee Arthroplasty (Right, 2015); Abdominoplasty (10+ yrs ago); Breast reduction surgery (2012); Hysterectomy (age 37); Cholecystectomy, laparoscopic (05/10/2017); Coronary angioplasty with stent; and Thyroid surgery.    Discharge Recommendation: HH with 24/7 supervision    Equipment: none    Subjective:    Patient states:  \"I was a  at my children's school, it was nice.\"      Pain:  denies pain.      Communication with other providers:  Handoff to RN, OT    Restrictions: general precautions, fall risk    Home Setup/Prior level of function  Social/Functional History  Lives With: Spouse  Type of Home: House  Home Layout: One level  Home Access: Stairs to enter without rails  Entrance Stairs - Number of Steps: 3 LONNIE  Bathroom Equipment: None  Home Equipment: None  Occupation: Retired    Examination of body systems (includes body structures/functions, activity/participation limitations):  Observation:  pt supine in bed with spouse present upon arrival and agreeable to therapy  Vision:  WFL  Hearing:  slightly [place]  Cardiopulmonary:  no O2 needs  Cognition: impaired, see OT/SLP note for further evaluation.    Musculoskeletal  ROM R/L:  WFL.    Strength R/L:  4+/5, minimal impairment in function and endurance.    Neuro:  WFL      Mobility:  Rolling L/R:  independent  Supine to sit:  SBA  Transfers: pt completed STS from EOB, to/from commode, and to chair SBA  Sitting balance:  good.

## 2025-05-02 NOTE — TELEPHONE ENCOUNTER
We rec'vd a clearance from Dr Gonzalez for an EGD. I called Dr Gonzalez office, talked to Lida, explained the pt is in the hospital and we haven't ever seen this pt in the office, only at the hospital in 2023.  Lida said the pt has an OV there with Shagufta Moya on Monday 5/5/25, and the EGD on 5/8/25.  The pt is having a REYES today. Lida will talk to Shagufta Moya what she wants to do about the clearance and call us back.

## 2025-05-12 NOTE — TELEPHONE ENCOUNTER
I called Dr Gonzalez office, talked to Lida HOYOS what they want to do about getting a cardiac clearance- she said that per Dr Moya, she did the EGD w/out a clearance.

## 2025-07-10 ENCOUNTER — APPOINTMENT (OUTPATIENT)
Dept: CT IMAGING | Age: 75
End: 2025-07-10
Payer: MEDICARE

## 2025-07-10 ENCOUNTER — HOSPITAL ENCOUNTER (EMERGENCY)
Age: 75
Discharge: HOME | End: 2025-07-10
Attending: EMERGENCY MEDICINE
Payer: MEDICARE

## 2025-07-10 VITALS
OXYGEN SATURATION: 99 % | TEMPERATURE: 97.7 F | RESPIRATION RATE: 16 BRPM | WEIGHT: 125 LBS | SYSTOLIC BLOOD PRESSURE: 154 MMHG | HEART RATE: 62 BPM | BODY MASS INDEX: 19.62 KG/M2 | HEIGHT: 67 IN | DIASTOLIC BLOOD PRESSURE: 117 MMHG

## 2025-07-10 DIAGNOSIS — S01.01XA LACERATION OF SCALP, INITIAL ENCOUNTER: ICD-10-CM

## 2025-07-10 DIAGNOSIS — S09.90XA INJURY OF HEAD, INITIAL ENCOUNTER: Primary | ICD-10-CM
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
## 2025-07-10 PROCEDURE — 70486 CT MAXILLOFACIAL W/O DYE: CPT

## 2025-07-10 PROCEDURE — 12011 RPR F/E/E/N/L/M 2.5 CM/<: CPT

## 2025-07-10 PROCEDURE — 6360000002 HC RX W HCPCS: Performed by: EMERGENCY MEDICINE

## 2025-07-10 PROCEDURE — 6370000000 HC RX 637 (ALT 250 FOR IP): Performed by: EMERGENCY MEDICINE

## 2025-07-10 PROCEDURE — 72125 CT NECK SPINE W/O DYE: CPT

## 2025-07-10 PROCEDURE — 99284 EMERGENCY DEPT VISIT MOD MDM: CPT

## 2025-07-10 PROCEDURE — 90715 TDAP VACCINE 7 YRS/> IM: CPT | Performed by: EMERGENCY MEDICINE

## 2025-07-10 PROCEDURE — 90471 IMMUNIZATION ADMIN: CPT | Performed by: EMERGENCY MEDICINE

## 2025-07-10 PROCEDURE — 70450 CT HEAD/BRAIN W/O DYE: CPT

## 2025-07-10 RX ORDER — ACETAMINOPHEN 500 MG
1000 TABLET ORAL ONCE
Status: COMPLETED | OUTPATIENT
Start: 2025-07-10 | End: 2025-07-10

## 2025-07-10 RX ADMIN — ACETAMINOPHEN 1000 MG: 500 TABLET ORAL at 18:40

## 2025-07-10 RX ADMIN — TETANUS TOXOID, REDUCED DIPHTHERIA TOXOID AND ACELLULAR PERTUSSIS VACCINE, ADSORBED 0.5 ML: 5; 2.5; 8; 8; 2.5 SUSPENSION INTRAMUSCULAR at 20:03

## 2025-07-10 ASSESSMENT — PAIN SCALES - GENERAL
PAINLEVEL_OUTOF10: 5
PAINLEVEL_OUTOF10: 2
PAINLEVEL_OUTOF10: 0

## 2025-07-10 ASSESSMENT — PAIN DESCRIPTION - LOCATION: LOCATION: HEAD

## 2025-07-10 ASSESSMENT — PAIN DESCRIPTION - DESCRIPTORS: DESCRIPTORS: ACHING

## 2025-07-10 ASSESSMENT — ENCOUNTER SYMPTOMS
GASTROINTESTINAL NEGATIVE: 1
EYES NEGATIVE: 1
ALLERGIC/IMMUNOLOGIC NEGATIVE: 1
RESPIRATORY NEGATIVE: 1

## 2025-07-10 ASSESSMENT — PAIN - FUNCTIONAL ASSESSMENT
PAIN_FUNCTIONAL_ASSESSMENT: NONE - DENIES PAIN
PAIN_FUNCTIONAL_ASSESSMENT: NONE - DENIES PAIN
PAIN_FUNCTIONAL_ASSESSMENT: 0-10

## 2025-07-10 ASSESSMENT — PAIN DESCRIPTION - ORIENTATION: ORIENTATION: RIGHT

## 2025-07-10 NOTE — ED PROVIDER NOTES
[facility]      TRIAGE CHIEF COMPLAINT:   Fall (Takes aspirin) and Head Injury      [place]:  Francy Yañez is a 75 y.o. female that presents with complaint of fall head injury on aspirin.  Patient prior to arrival was walking on the sidewalk when she just fell and hit her head.  Did not pass out.  Has a small laceration to her right lateral orbit.  She is on aspirin.  No fevers nausea vomiting chest pain shortness of breath no neck or back pain no extremity pain.  No seizure activity, denies any severe headache has not taken medicine.  Not up-to-date on tetanus.  No other questions or concerns.  Not sure how she fell but just fell.  Denies any other preceding symptoms she has fallen a lot before she states.    REVIEW OF SYSTEMS:  At least 10 systems reviewed and otherwise acutely negative except as in the [place].    Review of Systems   Constitutional: Negative.    HENT: Negative.     Eyes: Negative.    Respiratory: Negative.     Cardiovascular: Negative.    Gastrointestinal: Negative.    Endocrine: Negative.    Genitourinary: Negative.    Musculoskeletal: Negative.    Skin:  Positive for wound.   Allergic/Immunologic: Negative.    Neurological:  Positive for headaches.   Hematological: Negative.    Psychiatric/Behavioral: Negative.     All other systems reviewed and are negative.      Past Medical History:   Diagnosis Date    CAD (coronary artery disease)     Hyperlipidemia     Hypertension     \"on medication since 2015\"     Past Surgical History:   Procedure Laterality Date    ABDOMINOPLASTY  10+ yrs ago    \"tummy tuck\"    BREAST REDUCTION SURGERY  2012    [place]    CHOLECYSTECTOMY, LAPAROSCOPIC  05/10/2017    CORONARY ANGIOPLASTY WITH STENT PLACEMENT      HYSTERECTOMY (CERVIX STATUS UNKNOWN)  age 37    left both ovaries\"    KNEE ARTHROPLASTY Right 2015    THYROID SURGERY      right side of thyroid removed     Family History   Problem Relation Age of Onset    Stroke Mother     No Known Problems

## 2025-07-10 NOTE — ED TRIAGE NOTES
Was walking on sidewalk when she somehow fell. Did not get dizzy, no LOC. Small laceration next to right eye.